# Patient Record
Sex: MALE | Race: OTHER | Employment: UNEMPLOYED | URBAN - METROPOLITAN AREA
[De-identification: names, ages, dates, MRNs, and addresses within clinical notes are randomized per-mention and may not be internally consistent; named-entity substitution may affect disease eponyms.]

---

## 2017-01-01 ENCOUNTER — APPOINTMENT (OUTPATIENT)
Dept: GENERAL RADIOLOGY | Age: 52
DRG: 617 | End: 2017-01-01
Attending: PHYSICIAN ASSISTANT
Payer: SELF-PAY

## 2017-01-01 ENCOUNTER — HOME HEALTH ADMISSION (OUTPATIENT)
Dept: HOME HEALTH SERVICES | Facility: HOME HEALTH | Age: 52
End: 2017-01-01

## 2017-01-01 ENCOUNTER — HOSPITAL ENCOUNTER (INPATIENT)
Age: 52
LOS: 17 days | Discharge: HOME HEALTH | DRG: 617 | End: 2017-08-10
Attending: EMERGENCY MEDICINE | Admitting: INTERNAL MEDICINE
Payer: SELF-PAY

## 2017-01-01 ENCOUNTER — HOME CARE VISIT (OUTPATIENT)
Dept: HOME HEALTH SERVICES | Facility: HOME HEALTH | Age: 52
End: 2017-01-01

## 2017-01-01 ENCOUNTER — HOME CARE VISIT (OUTPATIENT)
Dept: SCHEDULING | Facility: HOME HEALTH | Age: 52
End: 2017-01-01

## 2017-01-01 ENCOUNTER — APPOINTMENT (OUTPATIENT)
Dept: GENERAL RADIOLOGY | Age: 52
DRG: 617 | End: 2017-01-01
Attending: PODIATRIST
Payer: SELF-PAY

## 2017-01-01 ENCOUNTER — APPOINTMENT (OUTPATIENT)
Dept: INTERVENTIONAL RADIOLOGY/VASCULAR | Age: 52
DRG: 617 | End: 2017-01-01
Attending: SURGERY
Payer: SELF-PAY

## 2017-01-01 ENCOUNTER — ANESTHESIA EVENT (OUTPATIENT)
Dept: SURGERY | Age: 52
DRG: 617 | End: 2017-01-01
Payer: SELF-PAY

## 2017-01-01 ENCOUNTER — APPOINTMENT (OUTPATIENT)
Dept: MRI IMAGING | Age: 52
DRG: 617 | End: 2017-01-01
Attending: INTERNAL MEDICINE
Payer: SELF-PAY

## 2017-01-01 ENCOUNTER — APPOINTMENT (OUTPATIENT)
Dept: CT IMAGING | Age: 52
DRG: 617 | End: 2017-01-01
Attending: INTERNAL MEDICINE
Payer: SELF-PAY

## 2017-01-01 ENCOUNTER — ANESTHESIA (OUTPATIENT)
Dept: SURGERY | Age: 52
DRG: 617 | End: 2017-01-01
Payer: SELF-PAY

## 2017-01-01 VITALS
RESPIRATION RATE: 14 BRPM | OXYGEN SATURATION: 98 % | BODY MASS INDEX: 27.2 KG/M2 | WEIGHT: 190 LBS | HEIGHT: 70 IN | TEMPERATURE: 97.8 F | HEART RATE: 103 BPM | SYSTOLIC BLOOD PRESSURE: 148 MMHG | DIASTOLIC BLOOD PRESSURE: 101 MMHG

## 2017-01-01 VITALS
HEART RATE: 96 BPM | RESPIRATION RATE: 16 BRPM | SYSTOLIC BLOOD PRESSURE: 104 MMHG | OXYGEN SATURATION: 97 % | DIASTOLIC BLOOD PRESSURE: 58 MMHG | TEMPERATURE: 97.3 F

## 2017-01-01 VITALS
RESPIRATION RATE: 14 BRPM | OXYGEN SATURATION: 98 % | DIASTOLIC BLOOD PRESSURE: 60 MMHG | HEART RATE: 82 BPM | SYSTOLIC BLOOD PRESSURE: 110 MMHG

## 2017-01-01 VITALS
HEART RATE: 90 BPM | DIASTOLIC BLOOD PRESSURE: 72 MMHG | HEIGHT: 70 IN | OXYGEN SATURATION: 100 % | TEMPERATURE: 98.6 F | RESPIRATION RATE: 18 BRPM | SYSTOLIC BLOOD PRESSURE: 126 MMHG | WEIGHT: 188.71 LBS | BODY MASS INDEX: 27.02 KG/M2

## 2017-01-01 VITALS
TEMPERATURE: 97.6 F | HEART RATE: 67 BPM | SYSTOLIC BLOOD PRESSURE: 163 MMHG | RESPIRATION RATE: 14 BRPM | OXYGEN SATURATION: 95 % | DIASTOLIC BLOOD PRESSURE: 111 MMHG

## 2017-01-01 VITALS
DIASTOLIC BLOOD PRESSURE: 78 MMHG | SYSTOLIC BLOOD PRESSURE: 130 MMHG | TEMPERATURE: 96 F | OXYGEN SATURATION: 97 % | HEART RATE: 97 BPM

## 2017-01-01 VITALS
SYSTOLIC BLOOD PRESSURE: 138 MMHG | DIASTOLIC BLOOD PRESSURE: 72 MMHG | OXYGEN SATURATION: 99 % | HEART RATE: 98 BPM | RESPIRATION RATE: 16 BRPM

## 2017-01-01 DIAGNOSIS — S91.332A PUNCTURE WOUND OF FOOT, LEFT, INITIAL ENCOUNTER: ICD-10-CM

## 2017-01-01 DIAGNOSIS — L03.119 CELLULITIS OF FOOT: Primary | ICD-10-CM

## 2017-01-01 DIAGNOSIS — R73.9 HYPERGLYCEMIA: ICD-10-CM

## 2017-01-01 DIAGNOSIS — I99.8 ISCHEMIC LEG: ICD-10-CM

## 2017-01-01 LAB
ACID FAST STN SPEC: NEGATIVE
ALBUMIN SERPL BCP-MCNC: 2.9 G/DL (ref 3.4–5)
ALBUMIN/GLOB SERPL: 0.5 {RATIO} (ref 0.8–1.7)
ALP SERPL-CCNC: 99 U/L (ref 45–117)
ALT SERPL-CCNC: 16 U/L (ref 16–61)
ANION GAP BLD CALC-SCNC: 10 MMOL/L (ref 3–18)
ANION GAP BLD CALC-SCNC: 11 MMOL/L (ref 3–18)
ANION GAP BLD CALC-SCNC: 11 MMOL/L (ref 3–18)
ANION GAP BLD CALC-SCNC: 12 MMOL/L (ref 3–18)
ANION GAP BLD CALC-SCNC: 12 MMOL/L (ref 3–18)
ANION GAP BLD CALC-SCNC: 14 MMOL/L (ref 3–18)
ANION GAP BLD CALC-SCNC: 6 MMOL/L (ref 3–18)
ANION GAP BLD CALC-SCNC: 6 MMOL/L (ref 3–18)
ANION GAP BLD CALC-SCNC: 7 MMOL/L (ref 3–18)
ANION GAP BLD CALC-SCNC: 9 MMOL/L (ref 3–18)
APPEARANCE UR: CLEAR
AST SERPL W P-5'-P-CCNC: 12 U/L (ref 15–37)
ATRIAL RATE: 98 BPM
BACTERIA SPEC CULT: ABNORMAL
BACTERIA SPEC CULT: NORMAL
BACTERIA SPEC CULT: NORMAL
BACTERIA URNS QL MICRO: NEGATIVE /HPF
BASOPHILS # BLD AUTO: 0 K/UL (ref 0–0.1)
BASOPHILS # BLD AUTO: 0.1 K/UL (ref 0–0.06)
BASOPHILS # BLD: 0 % (ref 0–3)
BASOPHILS # BLD: 1 % (ref 0–2)
BILIRUB SERPL-MCNC: 0.7 MG/DL (ref 0.2–1)
BILIRUB UR QL: NEGATIVE
BUN SERPL-MCNC: 10 MG/DL (ref 7–18)
BUN SERPL-MCNC: 11 MG/DL (ref 7–18)
BUN SERPL-MCNC: 11 MG/DL (ref 7–18)
BUN SERPL-MCNC: 13 MG/DL (ref 7–18)
BUN SERPL-MCNC: 13 MG/DL (ref 7–18)
BUN SERPL-MCNC: 14 MG/DL (ref 7–18)
BUN SERPL-MCNC: 15 MG/DL (ref 7–18)
BUN SERPL-MCNC: 18 MG/DL (ref 7–18)
BUN SERPL-MCNC: 7 MG/DL (ref 7–18)
BUN SERPL-MCNC: 8 MG/DL (ref 7–18)
BUN SERPL-MCNC: 9 MG/DL (ref 7–18)
BUN/CREAT SERPL: 10 (ref 12–20)
BUN/CREAT SERPL: 10 (ref 12–20)
BUN/CREAT SERPL: 11 (ref 12–20)
BUN/CREAT SERPL: 12 (ref 12–20)
BUN/CREAT SERPL: 13 (ref 12–20)
BUN/CREAT SERPL: 14 (ref 12–20)
BUN/CREAT SERPL: 14 (ref 12–20)
BUN/CREAT SERPL: 16 (ref 12–20)
BUN/CREAT SERPL: 8 (ref 12–20)
BUN/CREAT SERPL: 9 (ref 12–20)
CALCIUM SERPL-MCNC: 8.1 MG/DL (ref 8.5–10.1)
CALCIUM SERPL-MCNC: 8.1 MG/DL (ref 8.5–10.1)
CALCIUM SERPL-MCNC: 8.2 MG/DL (ref 8.5–10.1)
CALCIUM SERPL-MCNC: 8.3 MG/DL (ref 8.5–10.1)
CALCIUM SERPL-MCNC: 8.4 MG/DL (ref 8.5–10.1)
CALCIUM SERPL-MCNC: 8.5 MG/DL (ref 8.5–10.1)
CALCIUM SERPL-MCNC: 9.1 MG/DL (ref 8.5–10.1)
CALCULATED P AXIS, ECG09: 33 DEGREES
CALCULATED R AXIS, ECG10: -43 DEGREES
CALCULATED T AXIS, ECG11: 43 DEGREES
CHLORIDE SERPL-SCNC: 100 MMOL/L (ref 100–108)
CHLORIDE SERPL-SCNC: 101 MMOL/L (ref 100–108)
CHLORIDE SERPL-SCNC: 102 MMOL/L (ref 100–108)
CHLORIDE SERPL-SCNC: 91 MMOL/L (ref 100–108)
CHLORIDE SERPL-SCNC: 96 MMOL/L (ref 100–108)
CHLORIDE SERPL-SCNC: 97 MMOL/L (ref 100–108)
CHLORIDE SERPL-SCNC: 98 MMOL/L (ref 100–108)
CHLORIDE SERPL-SCNC: 99 MMOL/L (ref 100–108)
CHOLEST SERPL-MCNC: 148 MG/DL
CO2 SERPL-SCNC: 22 MMOL/L (ref 21–32)
CO2 SERPL-SCNC: 24 MMOL/L (ref 21–32)
CO2 SERPL-SCNC: 24 MMOL/L (ref 21–32)
CO2 SERPL-SCNC: 25 MMOL/L (ref 21–32)
CO2 SERPL-SCNC: 25 MMOL/L (ref 21–32)
CO2 SERPL-SCNC: 26 MMOL/L (ref 21–32)
CO2 SERPL-SCNC: 27 MMOL/L (ref 21–32)
CO2 SERPL-SCNC: 28 MMOL/L (ref 21–32)
CO2 SERPL-SCNC: 29 MMOL/L (ref 21–32)
COLOR UR: ABNORMAL
CREAT SERPL-MCNC: 0.8 MG/DL (ref 0.6–1.3)
CREAT SERPL-MCNC: 0.81 MG/DL (ref 0.6–1.3)
CREAT SERPL-MCNC: 0.81 MG/DL (ref 0.6–1.3)
CREAT SERPL-MCNC: 0.84 MG/DL (ref 0.6–1.3)
CREAT SERPL-MCNC: 0.85 MG/DL (ref 0.6–1.3)
CREAT SERPL-MCNC: 0.86 MG/DL (ref 0.6–1.3)
CREAT SERPL-MCNC: 0.86 MG/DL (ref 0.6–1.3)
CREAT SERPL-MCNC: 0.87 MG/DL (ref 0.6–1.3)
CREAT SERPL-MCNC: 0.91 MG/DL (ref 0.6–1.3)
CREAT SERPL-MCNC: 0.92 MG/DL (ref 0.6–1.3)
CREAT SERPL-MCNC: 0.94 MG/DL (ref 0.6–1.3)
CREAT SERPL-MCNC: 0.94 MG/DL (ref 0.6–1.3)
CREAT SERPL-MCNC: 0.95 MG/DL (ref 0.6–1.3)
CREAT SERPL-MCNC: 0.99 MG/DL (ref 0.6–1.3)
CREAT SERPL-MCNC: 1.01 MG/DL (ref 0.6–1.3)
CREAT SERPL-MCNC: 1.01 MG/DL (ref 0.6–1.3)
CREAT SERPL-MCNC: 1.09 MG/DL (ref 0.6–1.3)
CREAT SERPL-MCNC: 1.11 MG/DL (ref 0.6–1.3)
CREAT SERPL-MCNC: 1.42 MG/DL (ref 0.6–1.3)
DATE LAST DOSE: ABNORMAL
DATE LAST DOSE: ABNORMAL
DATE LAST DOSE: NORMAL
DATE LAST DOSE: NORMAL
DIAGNOSIS, 93000: NORMAL
DIFFERENTIAL METHOD BLD: ABNORMAL
EOSINOPHIL # BLD: 0.2 K/UL (ref 0–0.4)
EOSINOPHIL # BLD: 0.2 K/UL (ref 0–0.4)
EOSINOPHIL # BLD: 0.3 K/UL (ref 0–0.4)
EOSINOPHIL NFR BLD: 1 % (ref 0–5)
EOSINOPHIL NFR BLD: 2 % (ref 0–5)
EOSINOPHIL NFR BLD: 2 % (ref 0–5)
EOSINOPHIL NFR BLD: 3 % (ref 0–5)
EOSINOPHIL NFR BLD: 3 % (ref 0–5)
EPITH CASTS URNS QL MICRO: NEGATIVE /LPF (ref 0–5)
ERYTHROCYTE [DISTWIDTH] IN BLOOD BY AUTOMATED COUNT: 12.9 % (ref 11.6–14.5)
ERYTHROCYTE [DISTWIDTH] IN BLOOD BY AUTOMATED COUNT: 13 % (ref 11.6–14.5)
ERYTHROCYTE [DISTWIDTH] IN BLOOD BY AUTOMATED COUNT: 13.1 % (ref 11.6–14.5)
ERYTHROCYTE [DISTWIDTH] IN BLOOD BY AUTOMATED COUNT: 13.2 % (ref 11.6–14.5)
ERYTHROCYTE [DISTWIDTH] IN BLOOD BY AUTOMATED COUNT: 13.2 % (ref 11.6–14.5)
ERYTHROCYTE [DISTWIDTH] IN BLOOD BY AUTOMATED COUNT: 13.7 % (ref 11.6–14.5)
EST. AVERAGE GLUCOSE BLD GHB EST-MCNC: 275 MG/DL
GLOBULIN SER CALC-MCNC: 5.3 G/DL (ref 2–4)
GLUCOSE BLD STRIP.AUTO-MCNC: 101 MG/DL (ref 70–110)
GLUCOSE BLD STRIP.AUTO-MCNC: 102 MG/DL (ref 70–110)
GLUCOSE BLD STRIP.AUTO-MCNC: 103 MG/DL (ref 70–110)
GLUCOSE BLD STRIP.AUTO-MCNC: 116 MG/DL (ref 70–110)
GLUCOSE BLD STRIP.AUTO-MCNC: 117 MG/DL (ref 70–110)
GLUCOSE BLD STRIP.AUTO-MCNC: 118 MG/DL (ref 70–110)
GLUCOSE BLD STRIP.AUTO-MCNC: 121 MG/DL (ref 70–110)
GLUCOSE BLD STRIP.AUTO-MCNC: 121 MG/DL (ref 70–110)
GLUCOSE BLD STRIP.AUTO-MCNC: 123 MG/DL (ref 70–110)
GLUCOSE BLD STRIP.AUTO-MCNC: 128 MG/DL (ref 70–110)
GLUCOSE BLD STRIP.AUTO-MCNC: 129 MG/DL (ref 70–110)
GLUCOSE BLD STRIP.AUTO-MCNC: 130 MG/DL (ref 70–110)
GLUCOSE BLD STRIP.AUTO-MCNC: 130 MG/DL (ref 70–110)
GLUCOSE BLD STRIP.AUTO-MCNC: 131 MG/DL (ref 70–110)
GLUCOSE BLD STRIP.AUTO-MCNC: 134 MG/DL (ref 70–110)
GLUCOSE BLD STRIP.AUTO-MCNC: 135 MG/DL (ref 70–110)
GLUCOSE BLD STRIP.AUTO-MCNC: 139 MG/DL (ref 70–110)
GLUCOSE BLD STRIP.AUTO-MCNC: 143 MG/DL (ref 70–110)
GLUCOSE BLD STRIP.AUTO-MCNC: 146 MG/DL (ref 70–110)
GLUCOSE BLD STRIP.AUTO-MCNC: 146 MG/DL (ref 70–110)
GLUCOSE BLD STRIP.AUTO-MCNC: 147 MG/DL (ref 70–110)
GLUCOSE BLD STRIP.AUTO-MCNC: 149 MG/DL (ref 70–110)
GLUCOSE BLD STRIP.AUTO-MCNC: 150 MG/DL (ref 70–110)
GLUCOSE BLD STRIP.AUTO-MCNC: 151 MG/DL (ref 70–110)
GLUCOSE BLD STRIP.AUTO-MCNC: 153 MG/DL (ref 70–110)
GLUCOSE BLD STRIP.AUTO-MCNC: 154 MG/DL (ref 70–110)
GLUCOSE BLD STRIP.AUTO-MCNC: 156 MG/DL (ref 70–110)
GLUCOSE BLD STRIP.AUTO-MCNC: 160 MG/DL (ref 70–110)
GLUCOSE BLD STRIP.AUTO-MCNC: 160 MG/DL (ref 70–110)
GLUCOSE BLD STRIP.AUTO-MCNC: 161 MG/DL (ref 70–110)
GLUCOSE BLD STRIP.AUTO-MCNC: 166 MG/DL (ref 70–110)
GLUCOSE BLD STRIP.AUTO-MCNC: 169 MG/DL (ref 70–110)
GLUCOSE BLD STRIP.AUTO-MCNC: 170 MG/DL (ref 70–110)
GLUCOSE BLD STRIP.AUTO-MCNC: 171 MG/DL (ref 70–110)
GLUCOSE BLD STRIP.AUTO-MCNC: 174 MG/DL (ref 70–110)
GLUCOSE BLD STRIP.AUTO-MCNC: 177 MG/DL (ref 70–110)
GLUCOSE BLD STRIP.AUTO-MCNC: 183 MG/DL (ref 70–110)
GLUCOSE BLD STRIP.AUTO-MCNC: 187 MG/DL (ref 70–110)
GLUCOSE BLD STRIP.AUTO-MCNC: 193 MG/DL (ref 70–110)
GLUCOSE BLD STRIP.AUTO-MCNC: 195 MG/DL (ref 70–110)
GLUCOSE BLD STRIP.AUTO-MCNC: 195 MG/DL (ref 70–110)
GLUCOSE BLD STRIP.AUTO-MCNC: 200 MG/DL (ref 70–110)
GLUCOSE BLD STRIP.AUTO-MCNC: 200 MG/DL (ref 70–110)
GLUCOSE BLD STRIP.AUTO-MCNC: 203 MG/DL (ref 70–110)
GLUCOSE BLD STRIP.AUTO-MCNC: 203 MG/DL (ref 70–110)
GLUCOSE BLD STRIP.AUTO-MCNC: 204 MG/DL (ref 70–110)
GLUCOSE BLD STRIP.AUTO-MCNC: 207 MG/DL (ref 70–110)
GLUCOSE BLD STRIP.AUTO-MCNC: 208 MG/DL (ref 70–110)
GLUCOSE BLD STRIP.AUTO-MCNC: 209 MG/DL (ref 70–110)
GLUCOSE BLD STRIP.AUTO-MCNC: 212 MG/DL (ref 70–110)
GLUCOSE BLD STRIP.AUTO-MCNC: 213 MG/DL (ref 70–110)
GLUCOSE BLD STRIP.AUTO-MCNC: 217 MG/DL (ref 70–110)
GLUCOSE BLD STRIP.AUTO-MCNC: 221 MG/DL (ref 70–110)
GLUCOSE BLD STRIP.AUTO-MCNC: 225 MG/DL (ref 70–110)
GLUCOSE BLD STRIP.AUTO-MCNC: 229 MG/DL (ref 70–110)
GLUCOSE BLD STRIP.AUTO-MCNC: 230 MG/DL (ref 70–110)
GLUCOSE BLD STRIP.AUTO-MCNC: 236 MG/DL (ref 70–110)
GLUCOSE BLD STRIP.AUTO-MCNC: 240 MG/DL (ref 70–110)
GLUCOSE BLD STRIP.AUTO-MCNC: 240 MG/DL (ref 70–110)
GLUCOSE BLD STRIP.AUTO-MCNC: 246 MG/DL (ref 70–110)
GLUCOSE BLD STRIP.AUTO-MCNC: 247 MG/DL (ref 70–110)
GLUCOSE BLD STRIP.AUTO-MCNC: 251 MG/DL (ref 70–110)
GLUCOSE BLD STRIP.AUTO-MCNC: 293 MG/DL (ref 70–110)
GLUCOSE BLD STRIP.AUTO-MCNC: 299 MG/DL (ref 70–110)
GLUCOSE BLD STRIP.AUTO-MCNC: 384 MG/DL (ref 70–110)
GLUCOSE BLD STRIP.AUTO-MCNC: 82 MG/DL (ref 70–110)
GLUCOSE BLD STRIP.AUTO-MCNC: 86 MG/DL (ref 70–110)
GLUCOSE BLD STRIP.AUTO-MCNC: 86 MG/DL (ref 70–110)
GLUCOSE BLD STRIP.AUTO-MCNC: 91 MG/DL (ref 70–110)
GLUCOSE BLD STRIP.AUTO-MCNC: 94 MG/DL (ref 70–110)
GLUCOSE BLD STRIP.AUTO-MCNC: 95 MG/DL (ref 70–110)
GLUCOSE BLD STRIP.AUTO-MCNC: 97 MG/DL (ref 70–110)
GLUCOSE BLD STRIP.AUTO-MCNC: 99 MG/DL (ref 70–110)
GLUCOSE SERPL-MCNC: 109 MG/DL (ref 74–99)
GLUCOSE SERPL-MCNC: 113 MG/DL (ref 74–99)
GLUCOSE SERPL-MCNC: 119 MG/DL (ref 74–99)
GLUCOSE SERPL-MCNC: 147 MG/DL (ref 74–99)
GLUCOSE SERPL-MCNC: 153 MG/DL (ref 74–99)
GLUCOSE SERPL-MCNC: 158 MG/DL (ref 74–99)
GLUCOSE SERPL-MCNC: 160 MG/DL (ref 74–99)
GLUCOSE SERPL-MCNC: 174 MG/DL (ref 74–99)
GLUCOSE SERPL-MCNC: 178 MG/DL (ref 74–99)
GLUCOSE SERPL-MCNC: 192 MG/DL (ref 74–99)
GLUCOSE SERPL-MCNC: 200 MG/DL (ref 74–99)
GLUCOSE SERPL-MCNC: 208 MG/DL (ref 74–99)
GLUCOSE SERPL-MCNC: 212 MG/DL (ref 74–99)
GLUCOSE SERPL-MCNC: 234 MG/DL (ref 74–99)
GLUCOSE SERPL-MCNC: 257 MG/DL (ref 74–99)
GLUCOSE SERPL-MCNC: 265 MG/DL (ref 74–99)
GLUCOSE SERPL-MCNC: 303 MG/DL (ref 74–99)
GLUCOSE SERPL-MCNC: 69 MG/DL (ref 74–99)
GLUCOSE SERPL-MCNC: 88 MG/DL (ref 74–99)
GLUCOSE UR STRIP.AUTO-MCNC: >1000 MG/DL
GRAM STN SPEC: ABNORMAL
HBA1C MFR BLD: 11.2 % (ref 4.5–5.6)
HCT VFR BLD AUTO: 32.4 % (ref 36–48)
HCT VFR BLD AUTO: 33.1 % (ref 36–48)
HCT VFR BLD AUTO: 34 % (ref 36–48)
HCT VFR BLD AUTO: 34.4 % (ref 36–48)
HCT VFR BLD AUTO: 34.5 % (ref 36–48)
HCT VFR BLD AUTO: 34.6 % (ref 36–48)
HCT VFR BLD AUTO: 34.9 % (ref 36–48)
HCT VFR BLD AUTO: 35.1 % (ref 36–48)
HCT VFR BLD AUTO: 35.5 % (ref 36–48)
HCT VFR BLD AUTO: 35.8 % (ref 36–48)
HCT VFR BLD AUTO: 36.3 % (ref 36–48)
HCT VFR BLD AUTO: 39.6 % (ref 36–48)
HDLC SERPL-MCNC: 26 MG/DL (ref 40–60)
HDLC SERPL: 5.7 {RATIO} (ref 0–5)
HGB BLD-MCNC: 10.7 G/DL (ref 13–16)
HGB BLD-MCNC: 11.7 G/DL (ref 13–16)
HGB BLD-MCNC: 11.9 G/DL (ref 13–16)
HGB BLD-MCNC: 12 G/DL (ref 13–16)
HGB BLD-MCNC: 12.3 G/DL (ref 13–16)
HGB BLD-MCNC: 12.4 G/DL (ref 13–16)
HGB BLD-MCNC: 12.6 G/DL (ref 13–16)
HGB BLD-MCNC: 14.1 G/DL (ref 13–16)
HGB UR QL STRIP: ABNORMAL
INR PPP: 1.1 (ref 0.8–1.2)
KETONES UR QL STRIP.AUTO: 40 MG/DL
LACTATE SERPL-SCNC: 1.2 MMOL/L (ref 0.4–2)
LDLC SERPL CALC-MCNC: 104.6 MG/DL (ref 0–100)
LEUKOCYTE ESTERASE UR QL STRIP.AUTO: NEGATIVE
LIPID PROFILE,FLP: ABNORMAL
LYMPHOCYTES # BLD AUTO: 17 % (ref 21–52)
LYMPHOCYTES # BLD AUTO: 18 % (ref 21–52)
LYMPHOCYTES # BLD AUTO: 19 % (ref 21–52)
LYMPHOCYTES # BLD AUTO: 20 % (ref 21–52)
LYMPHOCYTES # BLD AUTO: 9 % (ref 20–51)
LYMPHOCYTES # BLD: 1.5 K/UL (ref 0.8–3.5)
LYMPHOCYTES # BLD: 1.7 K/UL (ref 0.9–3.6)
LYMPHOCYTES # BLD: 1.7 K/UL (ref 0.9–3.6)
LYMPHOCYTES # BLD: 2.1 K/UL (ref 0.9–3.6)
LYMPHOCYTES # BLD: 2.2 K/UL (ref 0.9–3.6)
MAGNESIUM SERPL-MCNC: 1.8 MG/DL (ref 1.6–2.6)
MAGNESIUM SERPL-MCNC: 1.8 MG/DL (ref 1.6–2.6)
MAGNESIUM SERPL-MCNC: 1.9 MG/DL (ref 1.6–2.6)
MAGNESIUM SERPL-MCNC: 2 MG/DL (ref 1.6–2.6)
MAGNESIUM SERPL-MCNC: 2.1 MG/DL (ref 1.6–2.6)
MAGNESIUM SERPL-MCNC: 2.2 MG/DL (ref 1.6–2.6)
MCH RBC QN AUTO: 28.7 PG (ref 24–34)
MCH RBC QN AUTO: 28.8 PG (ref 24–34)
MCH RBC QN AUTO: 28.8 PG (ref 24–34)
MCH RBC QN AUTO: 29.1 PG (ref 24–34)
MCH RBC QN AUTO: 29.2 PG (ref 24–34)
MCH RBC QN AUTO: 29.2 PG (ref 24–34)
MCH RBC QN AUTO: 29.3 PG (ref 24–34)
MCH RBC QN AUTO: 29.4 PG (ref 24–34)
MCH RBC QN AUTO: 29.4 PG (ref 24–34)
MCH RBC QN AUTO: 29.6 PG (ref 24–34)
MCH RBC QN AUTO: 29.8 PG (ref 24–34)
MCH RBC QN AUTO: 29.9 PG (ref 24–34)
MCHC RBC AUTO-ENTMCNC: 33 G/DL (ref 31–37)
MCHC RBC AUTO-ENTMCNC: 33.8 G/DL (ref 31–37)
MCHC RBC AUTO-ENTMCNC: 33.9 G/DL (ref 31–37)
MCHC RBC AUTO-ENTMCNC: 34.2 G/DL (ref 31–37)
MCHC RBC AUTO-ENTMCNC: 34.4 G/DL (ref 31–37)
MCHC RBC AUTO-ENTMCNC: 34.4 G/DL (ref 31–37)
MCHC RBC AUTO-ENTMCNC: 34.6 G/DL (ref 31–37)
MCHC RBC AUTO-ENTMCNC: 34.7 G/DL (ref 31–37)
MCHC RBC AUTO-ENTMCNC: 34.9 G/DL (ref 31–37)
MCHC RBC AUTO-ENTMCNC: 35.2 G/DL (ref 31–37)
MCHC RBC AUTO-ENTMCNC: 35.3 G/DL (ref 31–37)
MCHC RBC AUTO-ENTMCNC: 35.6 G/DL (ref 31–37)
MCV RBC AUTO: 83.5 FL (ref 74–97)
MCV RBC AUTO: 84.1 FL (ref 74–97)
MCV RBC AUTO: 84.2 FL (ref 74–97)
MCV RBC AUTO: 84.2 FL (ref 74–97)
MCV RBC AUTO: 84.6 FL (ref 74–97)
MCV RBC AUTO: 84.6 FL (ref 74–97)
MCV RBC AUTO: 84.7 FL (ref 74–97)
MCV RBC AUTO: 85 FL (ref 74–97)
MCV RBC AUTO: 85.1 FL (ref 74–97)
MCV RBC AUTO: 87.3 FL (ref 74–97)
MONOCYTES # BLD: 1.1 K/UL (ref 0.05–1.2)
MONOCYTES # BLD: 1.2 K/UL (ref 0.05–1.2)
MONOCYTES # BLD: 1.3 K/UL (ref 0–1)
MONOCYTES NFR BLD AUTO: 10 % (ref 3–10)
MONOCYTES NFR BLD AUTO: 10 % (ref 3–10)
MONOCYTES NFR BLD AUTO: 11 % (ref 3–10)
MONOCYTES NFR BLD AUTO: 14 % (ref 3–10)
MONOCYTES NFR BLD AUTO: 8 % (ref 2–9)
MYCOBACTERIUM SPEC QL CULT: NEGATIVE
NEUTS SEG # BLD: 13.7 K/UL (ref 1.8–8)
NEUTS SEG # BLD: 5.1 K/UL (ref 1.8–8)
NEUTS SEG # BLD: 7.1 K/UL (ref 1.8–8)
NEUTS SEG # BLD: 7.9 K/UL (ref 1.8–8)
NEUTS SEG # BLD: 8.1 K/UL (ref 1.8–8)
NEUTS SEG NFR BLD AUTO: 63 % (ref 40–73)
NEUTS SEG NFR BLD AUTO: 67 % (ref 40–73)
NEUTS SEG NFR BLD AUTO: 68 % (ref 40–73)
NEUTS SEG NFR BLD AUTO: 69 % (ref 40–73)
NEUTS SEG NFR BLD AUTO: 82 % (ref 42–75)
NITRITE UR QL STRIP.AUTO: NEGATIVE
P-R INTERVAL, ECG05: 154 MS
PH UR STRIP: 6 [PH] (ref 5–8)
PLATELET # BLD AUTO: 223 K/UL (ref 135–420)
PLATELET # BLD AUTO: 244 K/UL (ref 135–420)
PLATELET # BLD AUTO: 251 K/UL (ref 135–420)
PLATELET # BLD AUTO: 258 K/UL (ref 135–420)
PLATELET # BLD AUTO: 260 K/UL (ref 135–420)
PLATELET # BLD AUTO: 279 K/UL (ref 135–420)
PLATELET # BLD AUTO: 298 K/UL (ref 135–420)
PLATELET # BLD AUTO: 351 K/UL (ref 135–420)
PLATELET # BLD AUTO: 362 K/UL (ref 135–420)
PLATELET # BLD AUTO: 372 K/UL (ref 135–420)
PLATELET # BLD AUTO: 376 K/UL (ref 135–420)
PLATELET # BLD AUTO: 400 K/UL (ref 135–420)
PMV BLD AUTO: 10 FL (ref 9.2–11.8)
PMV BLD AUTO: 10 FL (ref 9.2–11.8)
PMV BLD AUTO: 9.1 FL (ref 9.2–11.8)
PMV BLD AUTO: 9.1 FL (ref 9.2–11.8)
PMV BLD AUTO: 9.2 FL (ref 9.2–11.8)
PMV BLD AUTO: 9.3 FL (ref 9.2–11.8)
PMV BLD AUTO: 9.3 FL (ref 9.2–11.8)
PMV BLD AUTO: 9.4 FL (ref 9.2–11.8)
PMV BLD AUTO: 9.5 FL (ref 9.2–11.8)
PMV BLD AUTO: 9.6 FL (ref 9.2–11.8)
PMV BLD AUTO: 9.8 FL (ref 9.2–11.8)
PMV BLD AUTO: 9.9 FL (ref 9.2–11.8)
POTASSIUM SERPL-SCNC: 3.4 MMOL/L (ref 3.5–5.5)
POTASSIUM SERPL-SCNC: 3.5 MMOL/L (ref 3.5–5.5)
POTASSIUM SERPL-SCNC: 3.6 MMOL/L (ref 3.5–5.5)
POTASSIUM SERPL-SCNC: 3.7 MMOL/L (ref 3.5–5.5)
POTASSIUM SERPL-SCNC: 3.8 MMOL/L (ref 3.5–5.5)
POTASSIUM SERPL-SCNC: 3.9 MMOL/L (ref 3.5–5.5)
POTASSIUM SERPL-SCNC: 4 MMOL/L (ref 3.5–5.5)
POTASSIUM SERPL-SCNC: 4.1 MMOL/L (ref 3.5–5.5)
POTASSIUM SERPL-SCNC: 4.1 MMOL/L (ref 3.5–5.5)
POTASSIUM SERPL-SCNC: 4.3 MMOL/L (ref 3.5–5.5)
POTASSIUM SERPL-SCNC: 4.4 MMOL/L (ref 3.5–5.5)
PROT SERPL-MCNC: 8.2 G/DL (ref 6.4–8.2)
PROT UR STRIP-MCNC: 100 MG/DL
PROTHROMBIN TIME: 13.7 SEC (ref 11.5–15.2)
Q-T INTERVAL, ECG07: 358 MS
QRS DURATION, ECG06: 102 MS
QTC CALCULATION (BEZET), ECG08: 457 MS
RBC # BLD AUTO: 3.71 M/UL (ref 4.7–5.5)
RBC # BLD AUTO: 3.93 M/UL (ref 4.7–5.5)
RBC # BLD AUTO: 4 M/UL (ref 4.7–5.5)
RBC # BLD AUTO: 4.06 M/UL (ref 4.7–5.5)
RBC # BLD AUTO: 4.07 M/UL (ref 4.7–5.5)
RBC # BLD AUTO: 4.08 M/UL (ref 4.7–5.5)
RBC # BLD AUTO: 4.13 M/UL (ref 4.7–5.5)
RBC # BLD AUTO: 4.17 M/UL (ref 4.7–5.5)
RBC # BLD AUTO: 4.18 M/UL (ref 4.7–5.5)
RBC # BLD AUTO: 4.25 M/UL (ref 4.7–5.5)
RBC # BLD AUTO: 4.29 M/UL (ref 4.7–5.5)
RBC # BLD AUTO: 4.71 M/UL (ref 4.7–5.5)
RBC #/AREA URNS HPF: NORMAL /HPF (ref 0–5)
RBC MORPH BLD: ABNORMAL
REPORTED DOSE,DOSE: ABNORMAL UNITS
REPORTED DOSE,DOSE: ABNORMAL UNITS
REPORTED DOSE,DOSE: NORMAL UNITS
REPORTED DOSE,DOSE: NORMAL UNITS
REPORTED DOSE/TIME,TMG: 1000
REPORTED DOSE/TIME,TMG: 1114
REPORTED DOSE/TIME,TMG: 407
REPORTED DOSE/TIME,TMG: 933
SERVICE CMNT-IMP: ABNORMAL
SERVICE CMNT-IMP: NORMAL
SERVICE CMNT-IMP: NORMAL
SODIUM SERPL-SCNC: 127 MMOL/L (ref 136–145)
SODIUM SERPL-SCNC: 131 MMOL/L (ref 136–145)
SODIUM SERPL-SCNC: 132 MMOL/L (ref 136–145)
SODIUM SERPL-SCNC: 132 MMOL/L (ref 136–145)
SODIUM SERPL-SCNC: 133 MMOL/L (ref 136–145)
SODIUM SERPL-SCNC: 134 MMOL/L (ref 136–145)
SODIUM SERPL-SCNC: 135 MMOL/L (ref 136–145)
SODIUM SERPL-SCNC: 136 MMOL/L (ref 136–145)
SODIUM SERPL-SCNC: 138 MMOL/L (ref 136–145)
SODIUM SERPL-SCNC: 138 MMOL/L (ref 136–145)
SP GR UR REFRACTOMETRY: 1.03 (ref 1–1.03)
SPECIMEN PREPARATION: NORMAL
SPECIMEN SOURCE: NORMAL
TRIGL SERPL-MCNC: 87 MG/DL (ref ?–150)
UROBILINOGEN UR QL STRIP.AUTO: 2 EU/DL (ref 0.2–1)
VANCOMYCIN SERPL-MCNC: 13.1 UG/ML (ref 5–40)
VANCOMYCIN TROUGH SERPL-MCNC: 13.2 UG/ML (ref 10–20)
VANCOMYCIN TROUGH SERPL-MCNC: 18.4 UG/ML (ref 10–20)
VANCOMYCIN TROUGH SERPL-MCNC: 19.8 UG/ML (ref 10–20)
VANCOMYCIN TROUGH SERPL-MCNC: 21.1 UG/ML (ref 10–20)
VANCOMYCIN TROUGH SERPL-MCNC: 6.2 UG/ML (ref 10–20)
VENTRICULAR RATE, ECG03: 98 BPM
VLDLC SERPL CALC-MCNC: 17.4 MG/DL
WBC # BLD AUTO: 10.2 K/UL (ref 4.6–13.2)
WBC # BLD AUTO: 11.4 K/UL (ref 4.6–13.2)
WBC # BLD AUTO: 11.8 K/UL (ref 4.6–13.2)
WBC # BLD AUTO: 12.7 K/UL (ref 4.6–13.2)
WBC # BLD AUTO: 13.1 K/UL (ref 4.6–13.2)
WBC # BLD AUTO: 14.8 K/UL (ref 4.6–13.2)
WBC # BLD AUTO: 15.4 K/UL (ref 4.6–13.2)
WBC # BLD AUTO: 16.7 K/UL (ref 4.6–13.2)
WBC # BLD AUTO: 16.8 K/UL (ref 4.6–13.2)
WBC # BLD AUTO: 8.1 K/UL (ref 4.6–13.2)
WBC URNS QL MICRO: NORMAL /HPF (ref 0–5)

## 2017-01-01 PROCEDURE — 87070 CULTURE OTHR SPECIMN AEROBIC: CPT | Performed by: PODIATRIST

## 2017-01-01 PROCEDURE — 85025 COMPLETE CBC W/AUTO DIFF WBC: CPT | Performed by: SURGERY

## 2017-01-01 PROCEDURE — 74011250636 HC RX REV CODE- 250/636: Performed by: INTERNAL MEDICINE

## 2017-01-01 PROCEDURE — G0299 HHS/HOSPICE OF RN EA 15 MIN: HCPCS

## 2017-01-01 PROCEDURE — 80202 ASSAY OF VANCOMYCIN: CPT | Performed by: INTERNAL MEDICINE

## 2017-01-01 PROCEDURE — 74011250636 HC RX REV CODE- 250/636: Performed by: PODIATRIST

## 2017-01-01 PROCEDURE — 74011000250 HC RX REV CODE- 250

## 2017-01-01 PROCEDURE — 36415 COLL VENOUS BLD VENIPUNCTURE: CPT | Performed by: INTERNAL MEDICINE

## 2017-01-01 PROCEDURE — 74011250636 HC RX REV CODE- 250/636: Performed by: FAMILY MEDICINE

## 2017-01-01 PROCEDURE — 400013 HH SOC

## 2017-01-01 PROCEDURE — 74011636637 HC RX REV CODE- 636/637: Performed by: INTERNAL MEDICINE

## 2017-01-01 PROCEDURE — 82962 GLUCOSE BLOOD TEST: CPT

## 2017-01-01 PROCEDURE — 36415 COLL VENOUS BLD VENIPUNCTURE: CPT | Performed by: SURGERY

## 2017-01-01 PROCEDURE — 74011636637 HC RX REV CODE- 636/637: Performed by: HOSPITALIST

## 2017-01-01 PROCEDURE — G0155 HHCP-SVS OF CSW,EA 15 MIN: HCPCS

## 2017-01-01 PROCEDURE — B4101ZZ FLUOROSCOPY OF ABDOMINAL AORTA USING LOW OSMOLAR CONTRAST: ICD-10-PCS | Performed by: SURGERY

## 2017-01-01 PROCEDURE — 73630 X-RAY EXAM OF FOOT: CPT

## 2017-01-01 PROCEDURE — 74011250637 HC RX REV CODE- 250/637: Performed by: INTERNAL MEDICINE

## 2017-01-01 PROCEDURE — 74011636320 HC RX REV CODE- 636/320: Performed by: SURGERY

## 2017-01-01 PROCEDURE — 87186 SC STD MICRODIL/AGAR DIL: CPT | Performed by: PODIATRIST

## 2017-01-01 PROCEDURE — 97530 THERAPEUTIC ACTIVITIES: CPT

## 2017-01-01 PROCEDURE — G0151 HHCP-SERV OF PT,EA 15 MIN: HCPCS

## 2017-01-01 PROCEDURE — 65270000029 HC RM PRIVATE

## 2017-01-01 PROCEDURE — 76010000138 HC OR TIME 0.5 TO 1 HR: Performed by: PODIATRIST

## 2017-01-01 PROCEDURE — 74011250636 HC RX REV CODE- 250/636: Performed by: HOSPITALIST

## 2017-01-01 PROCEDURE — 77030013079 HC BLNKT BAIR HGGR 3M -A: Performed by: ANESTHESIOLOGY

## 2017-01-01 PROCEDURE — 93922 UPR/L XTREMITY ART 2 LEVELS: CPT

## 2017-01-01 PROCEDURE — 0Y6N0ZB DETACHMENT AT LEFT FOOT, PARTIAL 2ND RAY, OPEN APPROACH: ICD-10-PCS | Performed by: PODIATRIST

## 2017-01-01 PROCEDURE — 80048 BASIC METABOLIC PNL TOTAL CA: CPT | Performed by: PHYSICIAN ASSISTANT

## 2017-01-01 PROCEDURE — 74011636637 HC RX REV CODE- 636/637: Performed by: FAMILY MEDICINE

## 2017-01-01 PROCEDURE — 83735 ASSAY OF MAGNESIUM: CPT | Performed by: SURGERY

## 2017-01-01 PROCEDURE — 81001 URINALYSIS AUTO W/SCOPE: CPT | Performed by: PHYSICIAN ASSISTANT

## 2017-01-01 PROCEDURE — 77030018836 HC SOL IRR NACL ICUM -A: Performed by: PODIATRIST

## 2017-01-01 PROCEDURE — 80048 BASIC METABOLIC PNL TOTAL CA: CPT | Performed by: HOSPITALIST

## 2017-01-01 PROCEDURE — 77030011640 HC PAD GRND REM COVD -A: Performed by: PODIATRIST

## 2017-01-01 PROCEDURE — 74011250637 HC RX REV CODE- 250/637: Performed by: PHYSICIAN ASSISTANT

## 2017-01-01 PROCEDURE — 97116 GAIT TRAINING THERAPY: CPT

## 2017-01-01 PROCEDURE — 74011250637 HC RX REV CODE- 250/637: Performed by: SURGERY

## 2017-01-01 PROCEDURE — 74011250636 HC RX REV CODE- 250/636: Performed by: PHYSICIAN ASSISTANT

## 2017-01-01 PROCEDURE — 74011250636 HC RX REV CODE- 250/636

## 2017-01-01 PROCEDURE — 85025 COMPLETE CBC W/AUTO DIFF WBC: CPT | Performed by: PHYSICIAN ASSISTANT

## 2017-01-01 PROCEDURE — 87076 CULTURE ANAEROBE IDENT EACH: CPT | Performed by: PODIATRIST

## 2017-01-01 PROCEDURE — 77030019895 HC PCKNG STRP IODO -A: Performed by: PODIATRIST

## 2017-01-01 PROCEDURE — 74011000250 HC RX REV CODE- 250: Performed by: SURGERY

## 2017-01-01 PROCEDURE — 0Y6N0ZD DETACHMENT AT LEFT FOOT, PARTIAL 4TH RAY, OPEN APPROACH: ICD-10-PCS | Performed by: PODIATRIST

## 2017-01-01 PROCEDURE — 85027 COMPLETE CBC AUTOMATED: CPT | Performed by: PHYSICIAN ASSISTANT

## 2017-01-01 PROCEDURE — 74011250637 HC RX REV CODE- 250/637: Performed by: FAMILY MEDICINE

## 2017-01-01 PROCEDURE — 88305 TISSUE EXAM BY PATHOLOGIST: CPT | Performed by: PODIATRIST

## 2017-01-01 PROCEDURE — 77030013567 HC DRN WND RESERV BARD -A: Performed by: PODIATRIST

## 2017-01-01 PROCEDURE — 97161 PT EVAL LOW COMPLEX 20 MIN: CPT

## 2017-01-01 PROCEDURE — 80061 LIPID PANEL: CPT | Performed by: PHYSICIAN ASSISTANT

## 2017-01-01 PROCEDURE — 73701 CT LOWER EXTREMITY W/DYE: CPT

## 2017-01-01 PROCEDURE — 87077 CULTURE AEROBIC IDENTIFY: CPT | Performed by: PODIATRIST

## 2017-01-01 PROCEDURE — 74011636320 HC RX REV CODE- 636/320: Performed by: INTERNAL MEDICINE

## 2017-01-01 PROCEDURE — 36415 COLL VENOUS BLD VENIPUNCTURE: CPT | Performed by: PHYSICIAN ASSISTANT

## 2017-01-01 PROCEDURE — B41G1ZZ FLUOROSCOPY OF LEFT LOWER EXTREMITY ARTERIES USING LOW OSMOLAR CONTRAST: ICD-10-PCS | Performed by: SURGERY

## 2017-01-01 PROCEDURE — A6446 CONFORM BAND S W>=3" <5"/YD: HCPCS

## 2017-01-01 PROCEDURE — 76010000132 HC OR TIME 2.5 TO 3 HR: Performed by: PODIATRIST

## 2017-01-01 PROCEDURE — 99153 MOD SED SAME PHYS/QHP EA: CPT

## 2017-01-01 PROCEDURE — 96365 THER/PROPH/DIAG IV INF INIT: CPT

## 2017-01-01 PROCEDURE — 74011000250 HC RX REV CODE- 250: Performed by: PODIATRIST

## 2017-01-01 PROCEDURE — G0157 HHC PT ASSISTANT EA 15: HCPCS

## 2017-01-01 PROCEDURE — 85610 PROTHROMBIN TIME: CPT | Performed by: FAMILY MEDICINE

## 2017-01-01 PROCEDURE — 80048 BASIC METABOLIC PNL TOTAL CA: CPT | Performed by: SURGERY

## 2017-01-01 PROCEDURE — 83605 ASSAY OF LACTIC ACID: CPT | Performed by: PHYSICIAN ASSISTANT

## 2017-01-01 PROCEDURE — 99152 MOD SED SAME PHYS/QHP 5/>YRS: CPT

## 2017-01-01 PROCEDURE — 87075 CULTR BACTERIA EXCEPT BLOOD: CPT | Performed by: PODIATRIST

## 2017-01-01 PROCEDURE — 04CQ3ZZ EXTIRPATION OF MATTER FROM LEFT ANTERIOR TIBIAL ARTERY, PERCUTANEOUS APPROACH: ICD-10-PCS | Performed by: SURGERY

## 2017-01-01 PROCEDURE — A6252 ABSORPT DRG >16 <=48 W/O BDR: HCPCS

## 2017-01-01 PROCEDURE — 77030012508 HC MSK AIRWY LMA AMBU -A: Performed by: ANESTHESIOLOGY

## 2017-01-01 PROCEDURE — 87070 CULTURE OTHR SPECIMN AEROBIC: CPT | Performed by: PHYSICIAN ASSISTANT

## 2017-01-01 PROCEDURE — 80048 BASIC METABOLIC PNL TOTAL CA: CPT | Performed by: INTERNAL MEDICINE

## 2017-01-01 PROCEDURE — 93005 ELECTROCARDIOGRAM TRACING: CPT

## 2017-01-01 PROCEDURE — 76060000032 HC ANESTHESIA 0.5 TO 1 HR: Performed by: PODIATRIST

## 2017-01-01 PROCEDURE — 87077 CULTURE AEROBIC IDENTIFY: CPT | Performed by: PHYSICIAN ASSISTANT

## 2017-01-01 PROCEDURE — 0J9R0ZZ DRAINAGE OF LEFT FOOT SUBCUTANEOUS TISSUE AND FASCIA, OPEN APPROACH: ICD-10-PCS | Performed by: PODIATRIST

## 2017-01-01 PROCEDURE — 74011250637 HC RX REV CODE- 250/637: Performed by: HOSPITALIST

## 2017-01-01 PROCEDURE — 77030011245: Performed by: PODIATRIST

## 2017-01-01 PROCEDURE — 74011250636 HC RX REV CODE- 250/636: Performed by: SURGERY

## 2017-01-01 PROCEDURE — A9585 GADOBUTROL INJECTION: HCPCS | Performed by: FAMILY MEDICINE

## 2017-01-01 PROCEDURE — 97166 OT EVAL MOD COMPLEX 45 MIN: CPT

## 2017-01-01 PROCEDURE — 83036 HEMOGLOBIN GLYCOSYLATED A1C: CPT | Performed by: INTERNAL MEDICINE

## 2017-01-01 PROCEDURE — 0Y6N0ZF DETACHMENT AT LEFT FOOT, PARTIAL 5TH RAY, OPEN APPROACH: ICD-10-PCS | Performed by: PODIATRIST

## 2017-01-01 PROCEDURE — 76210000006 HC OR PH I REC 0.5 TO 1 HR: Performed by: PODIATRIST

## 2017-01-01 PROCEDURE — 76060000036 HC ANESTHESIA 2.5 TO 3 HR: Performed by: PODIATRIST

## 2017-01-01 PROCEDURE — 80053 COMPREHEN METABOLIC PANEL: CPT | Performed by: PHYSICIAN ASSISTANT

## 2017-01-01 PROCEDURE — 87116 MYCOBACTERIA CULTURE: CPT | Performed by: PODIATRIST

## 2017-01-01 PROCEDURE — 85027 COMPLETE CBC AUTOMATED: CPT | Performed by: INTERNAL MEDICINE

## 2017-01-01 PROCEDURE — 0Y6N0ZC DETACHMENT AT LEFT FOOT, PARTIAL 3RD RAY, OPEN APPROACH: ICD-10-PCS | Performed by: PODIATRIST

## 2017-01-01 PROCEDURE — 047Q3ZZ DILATION OF LEFT ANTERIOR TIBIAL ARTERY, PERCUTANEOUS APPROACH: ICD-10-PCS | Performed by: SURGERY

## 2017-01-01 PROCEDURE — 85025 COMPLETE CBC W/AUTO DIFF WBC: CPT | Performed by: INTERNAL MEDICINE

## 2017-01-01 PROCEDURE — 99284 EMERGENCY DEPT VISIT MOD MDM: CPT

## 2017-01-01 PROCEDURE — 83735 ASSAY OF MAGNESIUM: CPT | Performed by: PHYSICIAN ASSISTANT

## 2017-01-01 PROCEDURE — 73720 MRI LWR EXTREMITY W/O&W/DYE: CPT

## 2017-01-01 PROCEDURE — 0LBW0ZZ EXCISION OF LEFT FOOT TENDON, OPEN APPROACH: ICD-10-PCS | Performed by: PODIATRIST

## 2017-01-01 PROCEDURE — 97535 SELF CARE MNGMENT TRAINING: CPT

## 2017-01-01 PROCEDURE — 88311 DECALCIFY TISSUE: CPT | Performed by: PODIATRIST

## 2017-01-01 PROCEDURE — A6450 LT COMPRES BAND >=5"/YD: HCPCS

## 2017-01-01 PROCEDURE — 76937 US GUIDE VASCULAR ACCESS: CPT

## 2017-01-01 PROCEDURE — 87040 BLOOD CULTURE FOR BACTERIA: CPT | Performed by: PHYSICIAN ASSISTANT

## 2017-01-01 PROCEDURE — 77010033678 HC OXYGEN DAILY

## 2017-01-01 PROCEDURE — 77030002916 HC SUT ETHLN J&J -A: Performed by: PODIATRIST

## 2017-01-01 PROCEDURE — A6449 LT COMPRES BAND >=3" <5"/YD: HCPCS

## 2017-01-01 RX ORDER — HYDROMORPHONE HYDROCHLORIDE 1 MG/ML
1 INJECTION, SOLUTION INTRAMUSCULAR; INTRAVENOUS; SUBCUTANEOUS
Status: DISCONTINUED | OUTPATIENT
Start: 2017-01-01 | End: 2017-01-01

## 2017-01-01 RX ORDER — SODIUM CHLORIDE 9 MG/ML
75 INJECTION, SOLUTION INTRAVENOUS CONTINUOUS
Status: DISCONTINUED | OUTPATIENT
Start: 2017-01-01 | End: 2017-01-01

## 2017-01-01 RX ORDER — ALBUTEROL SULFATE 0.83 MG/ML
2.5 SOLUTION RESPIRATORY (INHALATION) AS NEEDED
Status: DISCONTINUED | OUTPATIENT
Start: 2017-01-01 | End: 2017-01-01 | Stop reason: SDUPTHER

## 2017-01-01 RX ORDER — MIDAZOLAM HYDROCHLORIDE 1 MG/ML
1-4 INJECTION, SOLUTION INTRAMUSCULAR; INTRAVENOUS
Status: DISCONTINUED | OUTPATIENT
Start: 2017-01-01 | End: 2017-01-01 | Stop reason: ALTCHOICE

## 2017-01-01 RX ORDER — INSULIN LISPRO 100 [IU]/ML
5 INJECTION, SOLUTION INTRAVENOUS; SUBCUTANEOUS
Status: DISCONTINUED | OUTPATIENT
Start: 2017-01-01 | End: 2017-01-01

## 2017-01-01 RX ORDER — HEPARIN SODIUM 200 [USP'U]/100ML
1000 INJECTION, SOLUTION INTRAVENOUS
Status: COMPLETED | OUTPATIENT
Start: 2017-01-01 | End: 2017-01-01

## 2017-01-01 RX ORDER — SODIUM CHLORIDE, SODIUM LACTATE, POTASSIUM CHLORIDE, CALCIUM CHLORIDE 600; 310; 30; 20 MG/100ML; MG/100ML; MG/100ML; MG/100ML
150 INJECTION, SOLUTION INTRAVENOUS CONTINUOUS
Status: DISCONTINUED | OUTPATIENT
Start: 2017-01-01 | End: 2017-01-01 | Stop reason: SDUPTHER

## 2017-01-01 RX ORDER — SODIUM CHLORIDE 9 MG/ML
100 INJECTION, SOLUTION INTRAVENOUS CONTINUOUS
Status: DISCONTINUED | OUTPATIENT
Start: 2017-01-01 | End: 2017-01-01

## 2017-01-01 RX ORDER — POTASSIUM CHLORIDE 20 MEQ/1
20 TABLET, EXTENDED RELEASE ORAL
Status: COMPLETED | OUTPATIENT
Start: 2017-01-01 | End: 2017-01-01

## 2017-01-01 RX ORDER — ONDANSETRON 2 MG/ML
4 INJECTION INTRAMUSCULAR; INTRAVENOUS ONCE
Status: DISCONTINUED | OUTPATIENT
Start: 2017-01-01 | End: 2017-01-01 | Stop reason: SDUPTHER

## 2017-01-01 RX ORDER — NALOXONE HYDROCHLORIDE 0.4 MG/ML
0.2 INJECTION, SOLUTION INTRAMUSCULAR; INTRAVENOUS; SUBCUTANEOUS AS NEEDED
Status: DISCONTINUED | OUTPATIENT
Start: 2017-01-01 | End: 2017-01-01 | Stop reason: HOSPADM

## 2017-01-01 RX ORDER — SODIUM CHLORIDE 0.9 % (FLUSH) 0.9 %
5-10 SYRINGE (ML) INJECTION AS NEEDED
Status: DISCONTINUED | OUTPATIENT
Start: 2017-01-01 | End: 2017-01-01 | Stop reason: HOSPADM

## 2017-01-01 RX ORDER — ENOXAPARIN SODIUM 100 MG/ML
40 INJECTION SUBCUTANEOUS EVERY 24 HOURS
Status: DISCONTINUED | OUTPATIENT
Start: 2017-01-01 | End: 2017-01-01 | Stop reason: HOSPADM

## 2017-01-01 RX ORDER — FLUMAZENIL 0.1 MG/ML
0.2 INJECTION INTRAVENOUS AS NEEDED
Status: DISCONTINUED | OUTPATIENT
Start: 2017-01-01 | End: 2017-01-01 | Stop reason: ALTCHOICE

## 2017-01-01 RX ORDER — FENTANYL CITRATE 50 UG/ML
INJECTION, SOLUTION INTRAMUSCULAR; INTRAVENOUS AS NEEDED
Status: DISCONTINUED | OUTPATIENT
Start: 2017-01-01 | End: 2017-01-01 | Stop reason: HOSPADM

## 2017-01-01 RX ORDER — MORPHINE SULFATE 4 MG/ML
1 INJECTION, SOLUTION INTRAMUSCULAR; INTRAVENOUS
Status: DISCONTINUED | OUTPATIENT
Start: 2017-01-01 | End: 2017-01-01 | Stop reason: HOSPADM

## 2017-01-01 RX ORDER — INSULIN LISPRO 100 [IU]/ML
INJECTION, SOLUTION INTRAVENOUS; SUBCUTANEOUS ONCE
Status: DISCONTINUED | OUTPATIENT
Start: 2017-01-01 | End: 2017-01-01 | Stop reason: HOSPADM

## 2017-01-01 RX ORDER — METFORMIN HYDROCHLORIDE 1000 MG/1
1000 TABLET ORAL 2 TIMES DAILY WITH MEALS
Qty: 60 TAB | Refills: 1 | Status: SHIPPED | OUTPATIENT
Start: 2017-01-01

## 2017-01-01 RX ORDER — MAGNESIUM SULFATE 100 %
4 CRYSTALS MISCELLANEOUS AS NEEDED
Status: DISCONTINUED | OUTPATIENT
Start: 2017-01-01 | End: 2017-01-01 | Stop reason: SDUPTHER

## 2017-01-01 RX ORDER — GLIPIZIDE 5 MG/1
10 TABLET ORAL
Status: DISCONTINUED | OUTPATIENT
Start: 2017-01-01 | End: 2017-01-01 | Stop reason: HOSPADM

## 2017-01-01 RX ORDER — HYDROCODONE BITARTRATE AND ACETAMINOPHEN 5; 325 MG/1; MG/1
1 TABLET ORAL
Status: DISCONTINUED | OUTPATIENT
Start: 2017-01-01 | End: 2017-01-01 | Stop reason: HOSPADM

## 2017-01-01 RX ORDER — METFORMIN HYDROCHLORIDE 500 MG/1
500 TABLET ORAL 2 TIMES DAILY WITH MEALS
Status: DISCONTINUED | OUTPATIENT
Start: 2017-01-01 | End: 2017-01-01

## 2017-01-01 RX ORDER — LIDOCAINE HYDROCHLORIDE 20 MG/ML
INJECTION, SOLUTION EPIDURAL; INFILTRATION; INTRACAUDAL; PERINEURAL AS NEEDED
Status: DISCONTINUED | OUTPATIENT
Start: 2017-01-01 | End: 2017-01-01 | Stop reason: HOSPADM

## 2017-01-01 RX ORDER — DIPHENHYDRAMINE HYDROCHLORIDE 50 MG/ML
12.5 INJECTION, SOLUTION INTRAMUSCULAR; INTRAVENOUS
Status: DISCONTINUED | OUTPATIENT
Start: 2017-01-01 | End: 2017-01-01 | Stop reason: HOSPADM

## 2017-01-01 RX ORDER — DIPHENHYDRAMINE HYDROCHLORIDE 50 MG/ML
12.5 INJECTION, SOLUTION INTRAMUSCULAR; INTRAVENOUS
Status: DISCONTINUED | OUTPATIENT
Start: 2017-01-01 | End: 2017-01-01 | Stop reason: SDUPTHER

## 2017-01-01 RX ORDER — METFORMIN HYDROCHLORIDE 500 MG/1
1000 TABLET ORAL 2 TIMES DAILY WITH MEALS
Status: DISCONTINUED | OUTPATIENT
Start: 2017-01-01 | End: 2017-01-01 | Stop reason: HOSPADM

## 2017-01-01 RX ORDER — PROPOFOL 10 MG/ML
INJECTION, EMULSION INTRAVENOUS AS NEEDED
Status: DISCONTINUED | OUTPATIENT
Start: 2017-01-01 | End: 2017-01-01 | Stop reason: HOSPADM

## 2017-01-01 RX ORDER — ALBUTEROL SULFATE 0.83 MG/ML
2.5 SOLUTION RESPIRATORY (INHALATION) AS NEEDED
Status: DISCONTINUED | OUTPATIENT
Start: 2017-01-01 | End: 2017-01-01 | Stop reason: HOSPADM

## 2017-01-01 RX ORDER — HYDROCODONE BITARTRATE AND ACETAMINOPHEN 5; 325 MG/1; MG/1
1 TABLET ORAL AS NEEDED
Status: DISCONTINUED | OUTPATIENT
Start: 2017-01-01 | End: 2017-01-01 | Stop reason: HOSPADM

## 2017-01-01 RX ORDER — HYDROMORPHONE HYDROCHLORIDE 2 MG/ML
0.5 INJECTION, SOLUTION INTRAMUSCULAR; INTRAVENOUS; SUBCUTANEOUS
Status: DISCONTINUED | OUTPATIENT
Start: 2017-01-01 | End: 2017-01-01 | Stop reason: HOSPADM

## 2017-01-01 RX ORDER — NITROGLYCERIN 40 MG/100ML
10000 INJECTION INTRAVENOUS
Status: DISCONTINUED | OUTPATIENT
Start: 2017-01-01 | End: 2017-01-01 | Stop reason: ALTCHOICE

## 2017-01-01 RX ORDER — SODIUM CHLORIDE, SODIUM LACTATE, POTASSIUM CHLORIDE, CALCIUM CHLORIDE 600; 310; 30; 20 MG/100ML; MG/100ML; MG/100ML; MG/100ML
150 INJECTION, SOLUTION INTRAVENOUS CONTINUOUS
Status: DISCONTINUED | OUTPATIENT
Start: 2017-01-01 | End: 2017-01-01 | Stop reason: HOSPADM

## 2017-01-01 RX ORDER — DEXTROSE 50 % IN WATER (D50W) INTRAVENOUS SYRINGE
25-50 AS NEEDED
Status: DISCONTINUED | OUTPATIENT
Start: 2017-01-01 | End: 2017-01-01 | Stop reason: SDUPTHER

## 2017-01-01 RX ORDER — SODIUM CHLORIDE, SODIUM LACTATE, POTASSIUM CHLORIDE, CALCIUM CHLORIDE 600; 310; 30; 20 MG/100ML; MG/100ML; MG/100ML; MG/100ML
125 INJECTION, SOLUTION INTRAVENOUS CONTINUOUS
Status: DISCONTINUED | OUTPATIENT
Start: 2017-01-01 | End: 2017-01-01

## 2017-01-01 RX ORDER — INSULIN LISPRO 100 [IU]/ML
INJECTION, SOLUTION INTRAVENOUS; SUBCUTANEOUS
Status: DISCONTINUED | OUTPATIENT
Start: 2017-01-01 | End: 2017-01-01

## 2017-01-01 RX ORDER — INSULIN LISPRO 100 [IU]/ML
INJECTION, SOLUTION INTRAVENOUS; SUBCUTANEOUS ONCE
Status: ACTIVE | OUTPATIENT
Start: 2017-01-01 | End: 2017-01-01

## 2017-01-01 RX ORDER — HEPARIN SODIUM 1000 [USP'U]/ML
INJECTION, SOLUTION INTRAVENOUS; SUBCUTANEOUS
Status: COMPLETED
Start: 2017-01-01 | End: 2017-01-01

## 2017-01-01 RX ORDER — INSULIN GLARGINE 100 [IU]/ML
28 INJECTION, SOLUTION SUBCUTANEOUS DAILY
Status: DISCONTINUED | OUTPATIENT
Start: 2017-01-01 | End: 2017-01-01

## 2017-01-01 RX ORDER — SODIUM CHLORIDE 9 MG/ML
100 INJECTION, SOLUTION INTRAVENOUS CONTINUOUS
Status: DISPENSED | OUTPATIENT
Start: 2017-01-01 | End: 2017-01-01

## 2017-01-01 RX ORDER — MIDAZOLAM HYDROCHLORIDE 1 MG/ML
INJECTION, SOLUTION INTRAMUSCULAR; INTRAVENOUS AS NEEDED
Status: DISCONTINUED | OUTPATIENT
Start: 2017-01-01 | End: 2017-01-01 | Stop reason: HOSPADM

## 2017-01-01 RX ORDER — HYDROMORPHONE HYDROCHLORIDE 2 MG/ML
0.5 INJECTION, SOLUTION INTRAMUSCULAR; INTRAVENOUS; SUBCUTANEOUS
Status: DISCONTINUED | OUTPATIENT
Start: 2017-01-01 | End: 2017-01-01 | Stop reason: SDUPTHER

## 2017-01-01 RX ORDER — MAGNESIUM SULFATE 100 %
4 CRYSTALS MISCELLANEOUS AS NEEDED
Status: DISCONTINUED | OUTPATIENT
Start: 2017-01-01 | End: 2017-01-01 | Stop reason: HOSPADM

## 2017-01-01 RX ORDER — ATORVASTATIN CALCIUM 20 MG/1
40 TABLET, FILM COATED ORAL DAILY
Status: DISCONTINUED | OUTPATIENT
Start: 2017-01-01 | End: 2017-01-01 | Stop reason: HOSPADM

## 2017-01-01 RX ORDER — HYDROCODONE BITARTRATE AND ACETAMINOPHEN 5; 325 MG/1; MG/1
1 TABLET ORAL AS NEEDED
Status: DISCONTINUED | OUTPATIENT
Start: 2017-01-01 | End: 2017-01-01 | Stop reason: SDUPTHER

## 2017-01-01 RX ORDER — PHENYLEPHRINE HCL IN 0.9% NACL 30MG/250ML
PLASTIC BAG, INJECTION (ML) INTRAVENOUS
Status: DISCONTINUED | OUTPATIENT
Start: 2017-01-01 | End: 2017-01-01 | Stop reason: HOSPADM

## 2017-01-01 RX ORDER — ONDANSETRON 4 MG/1
4 TABLET, ORALLY DISINTEGRATING ORAL
Status: DISCONTINUED | OUTPATIENT
Start: 2017-01-01 | End: 2017-01-01 | Stop reason: HOSPADM

## 2017-01-01 RX ORDER — FENTANYL CITRATE 50 UG/ML
25 INJECTION, SOLUTION INTRAMUSCULAR; INTRAVENOUS
Status: DISCONTINUED | OUTPATIENT
Start: 2017-01-01 | End: 2017-01-01

## 2017-01-01 RX ORDER — ONDANSETRON 2 MG/ML
INJECTION INTRAMUSCULAR; INTRAVENOUS AS NEEDED
Status: DISCONTINUED | OUTPATIENT
Start: 2017-01-01 | End: 2017-01-01 | Stop reason: HOSPADM

## 2017-01-01 RX ORDER — ACETAMINOPHEN 325 MG/1
650 TABLET ORAL
Status: COMPLETED | OUTPATIENT
Start: 2017-01-01 | End: 2017-01-01

## 2017-01-01 RX ORDER — NALOXONE HYDROCHLORIDE 0.4 MG/ML
0.2 INJECTION, SOLUTION INTRAMUSCULAR; INTRAVENOUS; SUBCUTANEOUS
Status: DISCONTINUED | OUTPATIENT
Start: 2017-01-01 | End: 2017-01-01 | Stop reason: ALTCHOICE

## 2017-01-01 RX ORDER — INSULIN GLARGINE 100 [IU]/ML
34 INJECTION, SOLUTION SUBCUTANEOUS DAILY
Status: DISCONTINUED | OUTPATIENT
Start: 2017-01-01 | End: 2017-01-01

## 2017-01-01 RX ORDER — OXYCODONE AND ACETAMINOPHEN 5; 325 MG/1; MG/1
1 TABLET ORAL
Status: DISCONTINUED | OUTPATIENT
Start: 2017-01-01 | End: 2017-01-01 | Stop reason: HOSPADM

## 2017-01-01 RX ORDER — NALOXONE HYDROCHLORIDE 0.4 MG/ML
0.1 INJECTION, SOLUTION INTRAMUSCULAR; INTRAVENOUS; SUBCUTANEOUS AS NEEDED
Status: DISCONTINUED | OUTPATIENT
Start: 2017-01-01 | End: 2017-01-01 | Stop reason: SDUPTHER

## 2017-01-01 RX ORDER — SODIUM CHLORIDE, SODIUM LACTATE, POTASSIUM CHLORIDE, CALCIUM CHLORIDE 600; 310; 30; 20 MG/100ML; MG/100ML; MG/100ML; MG/100ML
100 INJECTION, SOLUTION INTRAVENOUS CONTINUOUS
Status: DISCONTINUED | OUTPATIENT
Start: 2017-01-01 | End: 2017-01-01

## 2017-01-01 RX ORDER — FENTANYL CITRATE 50 UG/ML
25-200 INJECTION, SOLUTION INTRAMUSCULAR; INTRAVENOUS
Status: DISCONTINUED | OUTPATIENT
Start: 2017-01-01 | End: 2017-01-01 | Stop reason: ALTCHOICE

## 2017-01-01 RX ORDER — ACETAMINOPHEN 325 MG/1
650 TABLET ORAL
Status: DISCONTINUED | OUTPATIENT
Start: 2017-01-01 | End: 2017-01-01 | Stop reason: HOSPADM

## 2017-01-01 RX ORDER — INSULIN GLARGINE 100 [IU]/ML
24 INJECTION, SOLUTION SUBCUTANEOUS DAILY
Status: DISCONTINUED | OUTPATIENT
Start: 2017-01-01 | End: 2017-01-01

## 2017-01-01 RX ORDER — EPHEDRINE SULFATE/0.9% NACL/PF 25 MG/5 ML
SYRINGE (ML) INTRAVENOUS AS NEEDED
Status: DISCONTINUED | OUTPATIENT
Start: 2017-01-01 | End: 2017-01-01 | Stop reason: HOSPADM

## 2017-01-01 RX ORDER — SODIUM CHLORIDE 0.9 % (FLUSH) 0.9 %
5-10 SYRINGE (ML) INJECTION AS NEEDED
Status: DISCONTINUED | OUTPATIENT
Start: 2017-01-01 | End: 2017-01-01 | Stop reason: SDUPTHER

## 2017-01-01 RX ORDER — NITROGLYCERIN 10 MG/100ML
INJECTION INTRAVENOUS
Status: DISCONTINUED
Start: 2017-01-01 | End: 2017-01-01 | Stop reason: ALTCHOICE

## 2017-01-01 RX ORDER — VERAPAMIL HYDROCHLORIDE 2.5 MG/ML
INJECTION, SOLUTION INTRAVENOUS
Status: COMPLETED
Start: 2017-01-01 | End: 2017-01-01

## 2017-01-01 RX ORDER — CLOPIDOGREL BISULFATE 75 MG/1
75 TABLET ORAL DAILY
Qty: 30 TAB | Refills: 1 | Status: SHIPPED | OUTPATIENT
Start: 2017-01-01

## 2017-01-01 RX ORDER — ONDANSETRON 2 MG/ML
4 INJECTION INTRAMUSCULAR; INTRAVENOUS ONCE
Status: DISCONTINUED | OUTPATIENT
Start: 2017-01-01 | End: 2017-01-01 | Stop reason: HOSPADM

## 2017-01-01 RX ORDER — DEXTROSE 50 % IN WATER (D50W) INTRAVENOUS SYRINGE
25-50 AS NEEDED
Status: DISCONTINUED | OUTPATIENT
Start: 2017-01-01 | End: 2017-01-01 | Stop reason: HOSPADM

## 2017-01-01 RX ORDER — INSULIN LISPRO 100 [IU]/ML
8 INJECTION, SOLUTION INTRAVENOUS; SUBCUTANEOUS
Status: DISCONTINUED | OUTPATIENT
Start: 2017-01-01 | End: 2017-01-01

## 2017-01-01 RX ORDER — CLOPIDOGREL BISULFATE 75 MG/1
300 TABLET ORAL ONCE
Status: COMPLETED | OUTPATIENT
Start: 2017-01-01 | End: 2017-01-01

## 2017-01-01 RX ORDER — AMOXICILLIN AND CLAVULANATE POTASSIUM 875; 125 MG/1; MG/1
1 TABLET, FILM COATED ORAL 2 TIMES DAILY
Qty: 14 TAB | Refills: 0 | Status: SHIPPED | OUTPATIENT
Start: 2017-01-01

## 2017-01-01 RX ORDER — ONDANSETRON 2 MG/ML
4 INJECTION INTRAMUSCULAR; INTRAVENOUS
Status: DISCONTINUED | OUTPATIENT
Start: 2017-01-01 | End: 2017-01-01 | Stop reason: HOSPADM

## 2017-01-01 RX ORDER — INSULIN GLARGINE 100 [IU]/ML
30 INJECTION, SOLUTION SUBCUTANEOUS DAILY
Status: DISCONTINUED | OUTPATIENT
Start: 2017-01-01 | End: 2017-01-01

## 2017-01-01 RX ORDER — SODIUM CHLORIDE 9 MG/ML
25 INJECTION, SOLUTION INTRAVENOUS CONTINUOUS
Status: DISCONTINUED | OUTPATIENT
Start: 2017-01-01 | End: 2017-01-01 | Stop reason: ALTCHOICE

## 2017-01-01 RX ORDER — INSULIN GLARGINE 100 [IU]/ML
9 INJECTION, SOLUTION SUBCUTANEOUS ONCE
Status: COMPLETED | OUTPATIENT
Start: 2017-01-01 | End: 2017-01-01

## 2017-01-01 RX ORDER — INSULIN GLARGINE 100 [IU]/ML
15 INJECTION, SOLUTION SUBCUTANEOUS DAILY
Status: DISCONTINUED | OUTPATIENT
Start: 2017-01-01 | End: 2017-01-01

## 2017-01-01 RX ORDER — VANCOMYCIN HYDROCHLORIDE
1250 EVERY 8 HOURS
Status: DISCONTINUED | OUTPATIENT
Start: 2017-01-01 | End: 2017-01-01 | Stop reason: DRUGHIGH

## 2017-01-01 RX ORDER — LEVOFLOXACIN 5 MG/ML
750 INJECTION, SOLUTION INTRAVENOUS EVERY 24 HOURS
Status: DISCONTINUED | OUTPATIENT
Start: 2017-01-01 | End: 2017-01-01

## 2017-01-01 RX ORDER — NALOXONE HYDROCHLORIDE 0.4 MG/ML
0.1 INJECTION, SOLUTION INTRAMUSCULAR; INTRAVENOUS; SUBCUTANEOUS AS NEEDED
Status: DISCONTINUED | OUTPATIENT
Start: 2017-01-01 | End: 2017-01-01 | Stop reason: HOSPADM

## 2017-01-01 RX ORDER — LIDOCAINE HYDROCHLORIDE 10 MG/ML
1-10 INJECTION, SOLUTION EPIDURAL; INFILTRATION; INTRACAUDAL; PERINEURAL
Status: COMPLETED | OUTPATIENT
Start: 2017-01-01 | End: 2017-01-01

## 2017-01-01 RX ORDER — INSULIN GLARGINE 100 [IU]/ML
20 INJECTION, SOLUTION SUBCUTANEOUS DAILY
Status: DISCONTINUED | OUTPATIENT
Start: 2017-01-01 | End: 2017-01-01

## 2017-01-01 RX ORDER — SIMVASTATIN 40 MG/1
40 TABLET, FILM COATED ORAL
Qty: 30 TAB | Refills: 1 | Status: SHIPPED | OUTPATIENT
Start: 2017-01-01

## 2017-01-01 RX ORDER — GLIPIZIDE 10 MG/1
10 TABLET ORAL
Qty: 60 TAB | Refills: 1 | Status: SHIPPED | OUTPATIENT
Start: 2017-01-01

## 2017-01-01 RX ORDER — SODIUM CHLORIDE 0.9 % (FLUSH) 0.9 %
5-10 SYRINGE (ML) INJECTION AS NEEDED
Status: DISCONTINUED | OUTPATIENT
Start: 2017-01-01 | End: 2017-01-01

## 2017-01-01 RX ORDER — INSULIN LISPRO 100 [IU]/ML
10 INJECTION, SOLUTION INTRAVENOUS; SUBCUTANEOUS
Status: DISCONTINUED | OUTPATIENT
Start: 2017-01-01 | End: 2017-01-01

## 2017-01-01 RX ORDER — INSULIN GLARGINE 100 [IU]/ML
40 INJECTION, SOLUTION SUBCUTANEOUS DAILY
Status: DISCONTINUED | OUTPATIENT
Start: 2017-01-01 | End: 2017-01-01

## 2017-01-01 RX ORDER — ONDANSETRON 2 MG/ML
4 INJECTION INTRAMUSCULAR; INTRAVENOUS
Status: DISCONTINUED | OUTPATIENT
Start: 2017-01-01 | End: 2017-01-01

## 2017-01-01 RX ORDER — INSULIN LISPRO 100 [IU]/ML
INJECTION, SOLUTION INTRAVENOUS; SUBCUTANEOUS
Status: DISCONTINUED | OUTPATIENT
Start: 2017-01-01 | End: 2017-01-01 | Stop reason: HOSPADM

## 2017-01-01 RX ORDER — CLOPIDOGREL BISULFATE 75 MG/1
75 TABLET ORAL DAILY
Status: DISCONTINUED | OUTPATIENT
Start: 2017-01-01 | End: 2017-01-01 | Stop reason: HOSPADM

## 2017-01-01 RX ORDER — SODIUM CHLORIDE, SODIUM LACTATE, POTASSIUM CHLORIDE, CALCIUM CHLORIDE 600; 310; 30; 20 MG/100ML; MG/100ML; MG/100ML; MG/100ML
INJECTION, SOLUTION INTRAVENOUS
Status: DISCONTINUED | OUTPATIENT
Start: 2017-01-01 | End: 2017-01-01 | Stop reason: HOSPADM

## 2017-01-01 RX ADMIN — INSULIN LISPRO 10 UNITS: 100 INJECTION, SOLUTION INTRAVENOUS; SUBCUTANEOUS at 12:36

## 2017-01-01 RX ADMIN — INSULIN GLARGINE 34 UNITS: 100 INJECTION, SOLUTION SUBCUTANEOUS at 08:45

## 2017-01-01 RX ADMIN — INSULIN LISPRO 8 UNITS: 100 INJECTION, SOLUTION INTRAVENOUS; SUBCUTANEOUS at 12:02

## 2017-01-01 RX ADMIN — INSULIN LISPRO 5 UNITS: 100 INJECTION, SOLUTION INTRAVENOUS; SUBCUTANEOUS at 09:42

## 2017-01-01 RX ADMIN — SODIUM CHLORIDE 100 ML/HR: 900 INJECTION, SOLUTION INTRAVENOUS at 12:40

## 2017-01-01 RX ADMIN — INSULIN LISPRO 6 UNITS: 100 INJECTION, SOLUTION INTRAVENOUS; SUBCUTANEOUS at 07:53

## 2017-01-01 RX ADMIN — VANCOMYCIN HYDROCHLORIDE 1250 MG: 10 INJECTION, POWDER, LYOPHILIZED, FOR SOLUTION INTRAVENOUS at 23:14

## 2017-01-01 RX ADMIN — ATORVASTATIN CALCIUM 40 MG: 20 TABLET, FILM COATED ORAL at 08:06

## 2017-01-01 RX ADMIN — SODIUM CHLORIDE 75 ML/HR: 900 INJECTION, SOLUTION INTRAVENOUS at 16:34

## 2017-01-01 RX ADMIN — INSULIN LISPRO 9 UNITS: 100 INJECTION, SOLUTION INTRAVENOUS; SUBCUTANEOUS at 13:06

## 2017-01-01 RX ADMIN — METFORMIN HYDROCHLORIDE 1000 MG: 500 TABLET, FILM COATED ORAL at 08:37

## 2017-01-01 RX ADMIN — HYDROCODONE BITARTRATE AND ACETAMINOPHEN 1 TABLET: 5; 325 TABLET ORAL at 03:51

## 2017-01-01 RX ADMIN — INSULIN LISPRO 9 UNITS: 100 INJECTION, SOLUTION INTRAVENOUS; SUBCUTANEOUS at 12:03

## 2017-01-01 RX ADMIN — SODIUM CHLORIDE 100 ML/HR: 900 INJECTION, SOLUTION INTRAVENOUS at 12:38

## 2017-01-01 RX ADMIN — VANCOMYCIN HYDROCHLORIDE 1250 MG: 10 INJECTION, POWDER, LYOPHILIZED, FOR SOLUTION INTRAVENOUS at 07:12

## 2017-01-01 RX ADMIN — INSULIN LISPRO 4 UNITS: 100 INJECTION, SOLUTION INTRAVENOUS; SUBCUTANEOUS at 16:48

## 2017-01-01 RX ADMIN — SODIUM CHLORIDE 100 ML/HR: 900 INJECTION, SOLUTION INTRAVENOUS at 04:19

## 2017-01-01 RX ADMIN — SODIUM CHLORIDE 100 ML/HR: 900 INJECTION, SOLUTION INTRAVENOUS at 12:42

## 2017-01-01 RX ADMIN — SODIUM CHLORIDE, SODIUM LACTATE, POTASSIUM CHLORIDE, AND CALCIUM CHLORIDE 125 ML/HR: 600; 310; 30; 20 INJECTION, SOLUTION INTRAVENOUS at 05:51

## 2017-01-01 RX ADMIN — HYDROCODONE BITARTRATE AND ACETAMINOPHEN 1 TABLET: 5; 325 TABLET ORAL at 10:32

## 2017-01-01 RX ADMIN — INSULIN LISPRO 10 UNITS: 100 INJECTION, SOLUTION INTRAVENOUS; SUBCUTANEOUS at 16:33

## 2017-01-01 RX ADMIN — SODIUM CHLORIDE, SODIUM LACTATE, POTASSIUM CHLORIDE, AND CALCIUM CHLORIDE 125 ML/HR: 600; 310; 30; 20 INJECTION, SOLUTION INTRAVENOUS at 16:45

## 2017-01-01 RX ADMIN — ENOXAPARIN SODIUM 40 MG: 40 INJECTION SUBCUTANEOUS at 21:25

## 2017-01-01 RX ADMIN — LEVOFLOXACIN 750 MG: 5 INJECTION, SOLUTION INTRAVENOUS at 21:25

## 2017-01-01 RX ADMIN — VANCOMYCIN HYDROCHLORIDE 1250 MG: 10 INJECTION, POWDER, LYOPHILIZED, FOR SOLUTION INTRAVENOUS at 06:23

## 2017-01-01 RX ADMIN — VANCOMYCIN HYDROCHLORIDE 1250 MG: 10 INJECTION, POWDER, LYOPHILIZED, FOR SOLUTION INTRAVENOUS at 15:36

## 2017-01-01 RX ADMIN — SODIUM CHLORIDE 100 ML/HR: 900 INJECTION, SOLUTION INTRAVENOUS at 16:34

## 2017-01-01 RX ADMIN — INSULIN LISPRO 8 UNITS: 100 INJECTION, SOLUTION INTRAVENOUS; SUBCUTANEOUS at 11:30

## 2017-01-01 RX ADMIN — HYDROCODONE BITARTRATE AND ACETAMINOPHEN 1 TABLET: 5; 325 TABLET ORAL at 02:20

## 2017-01-01 RX ADMIN — SODIUM CHLORIDE, SODIUM LACTATE, POTASSIUM CHLORIDE, AND CALCIUM CHLORIDE 125 ML/HR: 600; 310; 30; 20 INJECTION, SOLUTION INTRAVENOUS at 19:26

## 2017-01-01 RX ADMIN — NITROGLYCERIN 800 MCG: 40 INJECTION INTRAVENOUS at 15:54

## 2017-01-01 RX ADMIN — FENTANYL CITRATE 25 MCG: 50 INJECTION, SOLUTION INTRAMUSCULAR; INTRAVENOUS at 15:42

## 2017-01-01 RX ADMIN — HYDROCODONE BITARTRATE AND ACETAMINOPHEN 1 TABLET: 5; 325 TABLET ORAL at 20:05

## 2017-01-01 RX ADMIN — ENOXAPARIN SODIUM 40 MG: 40 INJECTION SUBCUTANEOUS at 21:35

## 2017-01-01 RX ADMIN — MULTIPLE VITAMINS W/ MINERALS TAB 1 TABLET: TAB at 08:37

## 2017-01-01 RX ADMIN — HYDROCODONE BITARTRATE AND ACETAMINOPHEN 1 TABLET: 5; 325 TABLET ORAL at 12:06

## 2017-01-01 RX ADMIN — INSULIN LISPRO 3 UNITS: 100 INJECTION, SOLUTION INTRAVENOUS; SUBCUTANEOUS at 17:21

## 2017-01-01 RX ADMIN — OXYCODONE AND ACETAMINOPHEN 1 TABLET: 5; 325 TABLET ORAL at 17:12

## 2017-01-01 RX ADMIN — VANCOMYCIN HYDROCHLORIDE 1250 MG: 10 INJECTION, POWDER, LYOPHILIZED, FOR SOLUTION INTRAVENOUS at 10:07

## 2017-01-01 RX ADMIN — GADOBUTROL 7.5 ML: 604.72 INJECTION INTRAVENOUS at 12:24

## 2017-01-01 RX ADMIN — ATORVASTATIN CALCIUM 40 MG: 20 TABLET, FILM COATED ORAL at 08:37

## 2017-01-01 RX ADMIN — Medication 10 MG: at 09:22

## 2017-01-01 RX ADMIN — VANCOMYCIN HYDROCHLORIDE 1250 MG: 10 INJECTION, POWDER, LYOPHILIZED, FOR SOLUTION INTRAVENOUS at 08:37

## 2017-01-01 RX ADMIN — ENOXAPARIN SODIUM 40 MG: 40 INJECTION SUBCUTANEOUS at 21:33

## 2017-01-01 RX ADMIN — INSULIN LISPRO 10 UNITS: 100 INJECTION, SOLUTION INTRAVENOUS; SUBCUTANEOUS at 13:43

## 2017-01-01 RX ADMIN — HYDROCODONE BITARTRATE AND ACETAMINOPHEN 1 TABLET: 5; 325 TABLET ORAL at 20:45

## 2017-01-01 RX ADMIN — METFORMIN HYDROCHLORIDE 1000 MG: 500 TABLET, FILM COATED ORAL at 08:07

## 2017-01-01 RX ADMIN — INSULIN GLARGINE 24 UNITS: 100 INJECTION, SOLUTION SUBCUTANEOUS at 09:00

## 2017-01-01 RX ADMIN — VANCOMYCIN HYDROCHLORIDE 1250 MG: 10 INJECTION, POWDER, LYOPHILIZED, FOR SOLUTION INTRAVENOUS at 11:14

## 2017-01-01 RX ADMIN — VANCOMYCIN HYDROCHLORIDE 1250 MG: 10 INJECTION, POWDER, LYOPHILIZED, FOR SOLUTION INTRAVENOUS at 06:35

## 2017-01-01 RX ADMIN — INSULIN LISPRO 10 UNITS: 100 INJECTION, SOLUTION INTRAVENOUS; SUBCUTANEOUS at 17:17

## 2017-01-01 RX ADMIN — INSULIN LISPRO 2 UNITS: 100 INJECTION, SOLUTION INTRAVENOUS; SUBCUTANEOUS at 21:33

## 2017-01-01 RX ADMIN — MIDAZOLAM HYDROCHLORIDE 2 MG: 1 INJECTION, SOLUTION INTRAMUSCULAR; INTRAVENOUS at 17:25

## 2017-01-01 RX ADMIN — CLOPIDOGREL BISULFATE 75 MG: 75 TABLET, FILM COATED ORAL at 08:24

## 2017-01-01 RX ADMIN — VANCOMYCIN HYDROCHLORIDE 1250 MG: 10 INJECTION, POWDER, LYOPHILIZED, FOR SOLUTION INTRAVENOUS at 16:18

## 2017-01-01 RX ADMIN — SODIUM CHLORIDE 1000 MG: 900 INJECTION, SOLUTION INTRAVENOUS at 20:05

## 2017-01-01 RX ADMIN — SODIUM CHLORIDE 75 ML/HR: 900 INJECTION, SOLUTION INTRAVENOUS at 13:38

## 2017-01-01 RX ADMIN — MIDAZOLAM HYDROCHLORIDE 0.5 MG: 1 INJECTION, SOLUTION INTRAMUSCULAR; INTRAVENOUS at 15:21

## 2017-01-01 RX ADMIN — ATORVASTATIN CALCIUM 40 MG: 20 TABLET, FILM COATED ORAL at 08:24

## 2017-01-01 RX ADMIN — SODIUM CHLORIDE 100 ML/HR: 900 INJECTION, SOLUTION INTRAVENOUS at 06:23

## 2017-01-01 RX ADMIN — SODIUM CHLORIDE 100 ML/HR: 900 INJECTION, SOLUTION INTRAVENOUS at 22:14

## 2017-01-01 RX ADMIN — HYDROCODONE BITARTRATE AND ACETAMINOPHEN 1 TABLET: 5; 325 TABLET ORAL at 18:28

## 2017-01-01 RX ADMIN — INSULIN LISPRO 2 UNITS: 100 INJECTION, SOLUTION INTRAVENOUS; SUBCUTANEOUS at 22:21

## 2017-01-01 RX ADMIN — CLOPIDOGREL BISULFATE 75 MG: 75 TABLET, FILM COATED ORAL at 08:37

## 2017-01-01 RX ADMIN — LEVOFLOXACIN 750 MG: 5 INJECTION, SOLUTION INTRAVENOUS at 17:22

## 2017-01-01 RX ADMIN — LEVOFLOXACIN 750 MG: 5 INJECTION, SOLUTION INTRAVENOUS at 22:23

## 2017-01-01 RX ADMIN — FENTANYL CITRATE 25 MCG: 50 INJECTION, SOLUTION INTRAMUSCULAR; INTRAVENOUS at 15:01

## 2017-01-01 RX ADMIN — FENTANYL CITRATE 25 MCG: 50 INJECTION, SOLUTION INTRAMUSCULAR; INTRAVENOUS at 15:27

## 2017-01-01 RX ADMIN — LEVOFLOXACIN 750 MG: 5 INJECTION, SOLUTION INTRAVENOUS at 21:46

## 2017-01-01 RX ADMIN — VANCOMYCIN HYDROCHLORIDE 1250 MG: 10 INJECTION, POWDER, LYOPHILIZED, FOR SOLUTION INTRAVENOUS at 22:56

## 2017-01-01 RX ADMIN — INSULIN LISPRO 6 UNITS: 100 INJECTION, SOLUTION INTRAVENOUS; SUBCUTANEOUS at 11:30

## 2017-01-01 RX ADMIN — INSULIN LISPRO 6 UNITS: 100 INJECTION, SOLUTION INTRAVENOUS; SUBCUTANEOUS at 21:13

## 2017-01-01 RX ADMIN — ATORVASTATIN CALCIUM 40 MG: 20 TABLET, FILM COATED ORAL at 09:25

## 2017-01-01 RX ADMIN — SODIUM CHLORIDE 25 ML/HR: 900 INJECTION, SOLUTION INTRAVENOUS at 15:20

## 2017-01-01 RX ADMIN — LEVOFLOXACIN 750 MG: 5 INJECTION, SOLUTION INTRAVENOUS at 22:21

## 2017-01-01 RX ADMIN — INSULIN LISPRO 10 UNITS: 100 INJECTION, SOLUTION INTRAVENOUS; SUBCUTANEOUS at 09:55

## 2017-01-01 RX ADMIN — INSULIN LISPRO 2 UNITS: 100 INJECTION, SOLUTION INTRAVENOUS; SUBCUTANEOUS at 08:49

## 2017-01-01 RX ADMIN — INSULIN LISPRO 10 UNITS: 100 INJECTION, SOLUTION INTRAVENOUS; SUBCUTANEOUS at 12:06

## 2017-01-01 RX ADMIN — FENTANYL CITRATE 25 MCG: 50 INJECTION, SOLUTION INTRAMUSCULAR; INTRAVENOUS at 15:44

## 2017-01-01 RX ADMIN — ENOXAPARIN SODIUM 40 MG: 40 INJECTION SUBCUTANEOUS at 22:11

## 2017-01-01 RX ADMIN — FENTANYL CITRATE 50 MCG: 50 INJECTION, SOLUTION INTRAMUSCULAR; INTRAVENOUS at 17:54

## 2017-01-01 RX ADMIN — HYDROCODONE BITARTRATE AND ACETAMINOPHEN 1 TABLET: 5; 325 TABLET ORAL at 04:25

## 2017-01-01 RX ADMIN — VANCOMYCIN HYDROCHLORIDE 1250 MG: 10 INJECTION, POWDER, LYOPHILIZED, FOR SOLUTION INTRAVENOUS at 07:45

## 2017-01-01 RX ADMIN — Medication 5 MG: at 10:38

## 2017-01-01 RX ADMIN — MULTIPLE VITAMINS W/ MINERALS TAB 1 TABLET: TAB at 09:41

## 2017-01-01 RX ADMIN — SODIUM CHLORIDE 75 ML/HR: 900 INJECTION, SOLUTION INTRAVENOUS at 10:23

## 2017-01-01 RX ADMIN — HYDROCODONE BITARTRATE AND ACETAMINOPHEN 1 TABLET: 5; 325 TABLET ORAL at 10:14

## 2017-01-01 RX ADMIN — MIDAZOLAM HYDROCHLORIDE 2 MG: 1 INJECTION, SOLUTION INTRAMUSCULAR; INTRAVENOUS at 08:50

## 2017-01-01 RX ADMIN — VANCOMYCIN HYDROCHLORIDE 1250 MG: 10 INJECTION, POWDER, LYOPHILIZED, FOR SOLUTION INTRAVENOUS at 01:06

## 2017-01-01 RX ADMIN — LEVOFLOXACIN 750 MG: 5 INJECTION, SOLUTION INTRAVENOUS at 21:59

## 2017-01-01 RX ADMIN — ACETAMINOPHEN 650 MG: 325 TABLET ORAL at 00:07

## 2017-01-01 RX ADMIN — LEVOFLOXACIN 750 MG: 5 INJECTION, SOLUTION INTRAVENOUS at 21:33

## 2017-01-01 RX ADMIN — INSULIN LISPRO 2 UNITS: 100 INJECTION, SOLUTION INTRAVENOUS; SUBCUTANEOUS at 22:17

## 2017-01-01 RX ADMIN — IOPAMIDOL 90 ML: 510 INJECTION, SOLUTION INTRAVASCULAR at 16:04

## 2017-01-01 RX ADMIN — INSULIN LISPRO 10 UNITS: 100 INJECTION, SOLUTION INTRAVENOUS; SUBCUTANEOUS at 11:30

## 2017-01-01 RX ADMIN — INSULIN LISPRO 5 UNITS: 100 INJECTION, SOLUTION INTRAVENOUS; SUBCUTANEOUS at 18:55

## 2017-01-01 RX ADMIN — HYDROCODONE BITARTRATE AND ACETAMINOPHEN 1 TABLET: 5; 325 TABLET ORAL at 21:01

## 2017-01-01 RX ADMIN — VANCOMYCIN HYDROCHLORIDE 1250 MG: 10 INJECTION, POWDER, LYOPHILIZED, FOR SOLUTION INTRAVENOUS at 08:45

## 2017-01-01 RX ADMIN — INSULIN LISPRO 10 UNITS: 100 INJECTION, SOLUTION INTRAVENOUS; SUBCUTANEOUS at 18:50

## 2017-01-01 RX ADMIN — INSULIN GLARGINE 20 UNITS: 100 INJECTION, SOLUTION SUBCUTANEOUS at 09:41

## 2017-01-01 RX ADMIN — HYDROCODONE BITARTRATE AND ACETAMINOPHEN 1 TABLET: 5; 325 TABLET ORAL at 22:11

## 2017-01-01 RX ADMIN — LEVOFLOXACIN 750 MG: 5 INJECTION, SOLUTION INTRAVENOUS at 16:46

## 2017-01-01 RX ADMIN — LEVOFLOXACIN 750 MG: 5 INJECTION, SOLUTION INTRAVENOUS at 18:56

## 2017-01-01 RX ADMIN — ENOXAPARIN SODIUM 40 MG: 40 INJECTION SUBCUTANEOUS at 21:47

## 2017-01-01 RX ADMIN — MULTIPLE VITAMINS W/ MINERALS TAB 1 TABLET: TAB at 09:51

## 2017-01-01 RX ADMIN — HYDROCODONE BITARTRATE AND ACETAMINOPHEN 1 TABLET: 5; 325 TABLET ORAL at 12:47

## 2017-01-01 RX ADMIN — INSULIN GLARGINE 24 UNITS: 100 INJECTION, SOLUTION SUBCUTANEOUS at 08:27

## 2017-01-01 RX ADMIN — SODIUM CHLORIDE, SODIUM LACTATE, POTASSIUM CHLORIDE, CALCIUM CHLORIDE: 600; 310; 30; 20 INJECTION, SOLUTION INTRAVENOUS at 10:50

## 2017-01-01 RX ADMIN — Medication 10 MG: at 09:05

## 2017-01-01 RX ADMIN — ENOXAPARIN SODIUM 40 MG: 40 INJECTION SUBCUTANEOUS at 21:36

## 2017-01-01 RX ADMIN — INSULIN GLARGINE 40 UNITS: 100 INJECTION, SOLUTION SUBCUTANEOUS at 09:25

## 2017-01-01 RX ADMIN — MIDAZOLAM HYDROCHLORIDE 0.5 MG: 1 INJECTION, SOLUTION INTRAMUSCULAR; INTRAVENOUS at 15:39

## 2017-01-01 RX ADMIN — VANCOMYCIN HYDROCHLORIDE 1250 MG: 10 INJECTION, POWDER, LYOPHILIZED, FOR SOLUTION INTRAVENOUS at 10:22

## 2017-01-01 RX ADMIN — METFORMIN HYDROCHLORIDE 1000 MG: 500 TABLET, FILM COATED ORAL at 16:52

## 2017-01-01 RX ADMIN — HEPARIN SODIUM 7000 UNITS: 1000 INJECTION, SOLUTION INTRAVENOUS; SUBCUTANEOUS at 15:04

## 2017-01-01 RX ADMIN — INSULIN LISPRO 8 UNITS: 100 INJECTION, SOLUTION INTRAVENOUS; SUBCUTANEOUS at 07:52

## 2017-01-01 RX ADMIN — HYDROCODONE BITARTRATE AND ACETAMINOPHEN 1 TABLET: 5; 325 TABLET ORAL at 13:06

## 2017-01-01 RX ADMIN — FENTANYL CITRATE 25 MCG: 50 INJECTION, SOLUTION INTRAMUSCULAR; INTRAVENOUS at 09:01

## 2017-01-01 RX ADMIN — INSULIN LISPRO 10 UNITS: 100 INJECTION, SOLUTION INTRAVENOUS; SUBCUTANEOUS at 12:34

## 2017-01-01 RX ADMIN — SODIUM CHLORIDE 1000 MG: 900 INJECTION, SOLUTION INTRAVENOUS at 12:33

## 2017-01-01 RX ADMIN — INSULIN LISPRO 10 UNITS: 100 INJECTION, SOLUTION INTRAVENOUS; SUBCUTANEOUS at 16:49

## 2017-01-01 RX ADMIN — INSULIN LISPRO 3 UNITS: 100 INJECTION, SOLUTION INTRAVENOUS; SUBCUTANEOUS at 21:34

## 2017-01-01 RX ADMIN — ENOXAPARIN SODIUM 40 MG: 40 INJECTION SUBCUTANEOUS at 22:06

## 2017-01-01 RX ADMIN — INSULIN LISPRO 10 UNITS: 100 INJECTION, SOLUTION INTRAVENOUS; SUBCUTANEOUS at 08:56

## 2017-01-01 RX ADMIN — VANCOMYCIN HYDROCHLORIDE 1750 MG: 10 INJECTION, POWDER, LYOPHILIZED, FOR SOLUTION INTRAVENOUS at 23:51

## 2017-01-01 RX ADMIN — Medication 5 MG: at 10:12

## 2017-01-01 RX ADMIN — ENOXAPARIN SODIUM 40 MG: 40 INJECTION SUBCUTANEOUS at 22:23

## 2017-01-01 RX ADMIN — ENOXAPARIN SODIUM 40 MG: 40 INJECTION SUBCUTANEOUS at 21:13

## 2017-01-01 RX ADMIN — SODIUM CHLORIDE 75 ML/HR: 900 INJECTION, SOLUTION INTRAVENOUS at 08:47

## 2017-01-01 RX ADMIN — Medication 1 MG: at 20:20

## 2017-01-01 RX ADMIN — FENTANYL CITRATE 50 MCG: 50 INJECTION, SOLUTION INTRAMUSCULAR; INTRAVENOUS at 17:51

## 2017-01-01 RX ADMIN — VANCOMYCIN HYDROCHLORIDE 1250 MG: 10 INJECTION, POWDER, LYOPHILIZED, FOR SOLUTION INTRAVENOUS at 17:40

## 2017-01-01 RX ADMIN — INSULIN LISPRO 5 UNITS: 100 INJECTION, SOLUTION INTRAVENOUS; SUBCUTANEOUS at 11:39

## 2017-01-01 RX ADMIN — OXYCODONE AND ACETAMINOPHEN 1 TABLET: 5; 325 TABLET ORAL at 01:30

## 2017-01-01 RX ADMIN — ACETAMINOPHEN 650 MG: 325 TABLET ORAL at 16:16

## 2017-01-01 RX ADMIN — INSULIN LISPRO 10 UNITS: 100 INJECTION, SOLUTION INTRAVENOUS; SUBCUTANEOUS at 17:40

## 2017-01-01 RX ADMIN — INSULIN LISPRO 10 UNITS: 100 INJECTION, SOLUTION INTRAVENOUS; SUBCUTANEOUS at 12:57

## 2017-01-01 RX ADMIN — INSULIN GLARGINE 40 UNITS: 100 INJECTION, SOLUTION SUBCUTANEOUS at 09:50

## 2017-01-01 RX ADMIN — SODIUM CHLORIDE 100 ML/HR: 900 INJECTION, SOLUTION INTRAVENOUS at 08:17

## 2017-01-01 RX ADMIN — SODIUM CHLORIDE 1000 MG: 900 INJECTION, SOLUTION INTRAVENOUS at 04:07

## 2017-01-01 RX ADMIN — INSULIN LISPRO 10 UNITS: 100 INJECTION, SOLUTION INTRAVENOUS; SUBCUTANEOUS at 08:44

## 2017-01-01 RX ADMIN — LIDOCAINE HYDROCHLORIDE 5 ML: 10 INJECTION, SOLUTION EPIDURAL; INFILTRATION; INTRACAUDAL; PERINEURAL at 15:00

## 2017-01-01 RX ADMIN — INSULIN GLARGINE 40 UNITS: 100 INJECTION, SOLUTION SUBCUTANEOUS at 08:49

## 2017-01-01 RX ADMIN — SODIUM CHLORIDE 1000 MG: 900 INJECTION, SOLUTION INTRAVENOUS at 04:04

## 2017-01-01 RX ADMIN — ENOXAPARIN SODIUM 40 MG: 40 INJECTION SUBCUTANEOUS at 22:16

## 2017-01-01 RX ADMIN — INSULIN LISPRO 6 UNITS: 100 INJECTION, SOLUTION INTRAVENOUS; SUBCUTANEOUS at 22:21

## 2017-01-01 RX ADMIN — ONDANSETRON 4 MG: 2 INJECTION INTRAMUSCULAR; INTRAVENOUS at 08:59

## 2017-01-01 RX ADMIN — MIDAZOLAM HYDROCHLORIDE 0.5 MG: 1 INJECTION, SOLUTION INTRAMUSCULAR; INTRAVENOUS at 15:24

## 2017-01-01 RX ADMIN — VANCOMYCIN HYDROCHLORIDE 1250 MG: 10 INJECTION, POWDER, LYOPHILIZED, FOR SOLUTION INTRAVENOUS at 02:57

## 2017-01-01 RX ADMIN — NITROGLYCERIN 400 MCG: 40 INJECTION INTRAVENOUS at 15:50

## 2017-01-01 RX ADMIN — MULTIPLE VITAMINS W/ MINERALS TAB 1 TABLET: TAB at 08:54

## 2017-01-01 RX ADMIN — LIDOCAINE HYDROCHLORIDE 40 MG: 20 INJECTION, SOLUTION EPIDURAL; INFILTRATION; INTRACAUDAL; PERINEURAL at 08:56

## 2017-01-01 RX ADMIN — VANCOMYCIN HYDROCHLORIDE 1250 MG: 10 INJECTION, POWDER, LYOPHILIZED, FOR SOLUTION INTRAVENOUS at 00:05

## 2017-01-01 RX ADMIN — VANCOMYCIN HYDROCHLORIDE 1250 MG: 10 INJECTION, POWDER, LYOPHILIZED, FOR SOLUTION INTRAVENOUS at 18:51

## 2017-01-01 RX ADMIN — OXYCODONE AND ACETAMINOPHEN 1 TABLET: 5; 325 TABLET ORAL at 23:49

## 2017-01-01 RX ADMIN — ENOXAPARIN SODIUM 40 MG: 40 INJECTION SUBCUTANEOUS at 21:01

## 2017-01-01 RX ADMIN — ATORVASTATIN CALCIUM 40 MG: 20 TABLET, FILM COATED ORAL at 09:41

## 2017-01-01 RX ADMIN — FENTANYL CITRATE 50 MCG: 50 INJECTION, SOLUTION INTRAMUSCULAR; INTRAVENOUS at 15:21

## 2017-01-01 RX ADMIN — INSULIN LISPRO 8 UNITS: 100 INJECTION, SOLUTION INTRAVENOUS; SUBCUTANEOUS at 16:34

## 2017-01-01 RX ADMIN — INSULIN LISPRO 8 UNITS: 100 INJECTION, SOLUTION INTRAVENOUS; SUBCUTANEOUS at 17:20

## 2017-01-01 RX ADMIN — SODIUM CHLORIDE 100 ML/HR: 900 INJECTION, SOLUTION INTRAVENOUS at 21:42

## 2017-01-01 RX ADMIN — INSULIN LISPRO 10 UNITS: 100 INJECTION, SOLUTION INTRAVENOUS; SUBCUTANEOUS at 17:08

## 2017-01-01 RX ADMIN — SODIUM CHLORIDE 1000 MG: 900 INJECTION, SOLUTION INTRAVENOUS at 21:33

## 2017-01-01 RX ADMIN — VERAPAMIL HYDROCHLORIDE 10 MG: 2.5 INJECTION INTRAVENOUS at 15:18

## 2017-01-01 RX ADMIN — ENOXAPARIN SODIUM 40 MG: 40 INJECTION SUBCUTANEOUS at 22:21

## 2017-01-01 RX ADMIN — SODIUM CHLORIDE 75 ML/HR: 900 INJECTION, SOLUTION INTRAVENOUS at 09:26

## 2017-01-01 RX ADMIN — INSULIN LISPRO 6 UNITS: 100 INJECTION, SOLUTION INTRAVENOUS; SUBCUTANEOUS at 12:35

## 2017-01-01 RX ADMIN — VANCOMYCIN HYDROCHLORIDE 1250 MG: 10 INJECTION, POWDER, LYOPHILIZED, FOR SOLUTION INTRAVENOUS at 22:38

## 2017-01-01 RX ADMIN — ENOXAPARIN SODIUM 40 MG: 40 INJECTION SUBCUTANEOUS at 23:44

## 2017-01-01 RX ADMIN — SODIUM CHLORIDE 100 ML/HR: 900 INJECTION, SOLUTION INTRAVENOUS at 22:07

## 2017-01-01 RX ADMIN — INSULIN LISPRO 4 UNITS: 100 INJECTION, SOLUTION INTRAVENOUS; SUBCUTANEOUS at 22:05

## 2017-01-01 RX ADMIN — VANCOMYCIN HYDROCHLORIDE 1250 MG: 10 INJECTION, POWDER, LYOPHILIZED, FOR SOLUTION INTRAVENOUS at 02:36

## 2017-01-01 RX ADMIN — ENOXAPARIN SODIUM 40 MG: 40 INJECTION SUBCUTANEOUS at 00:41

## 2017-01-01 RX ADMIN — INSULIN GLARGINE 15 UNITS: 100 INJECTION, SOLUTION SUBCUTANEOUS at 11:00

## 2017-01-01 RX ADMIN — GLIPIZIDE 10 MG: 5 TABLET ORAL at 08:37

## 2017-01-01 RX ADMIN — VANCOMYCIN HYDROCHLORIDE 1250 MG: 10 INJECTION, POWDER, LYOPHILIZED, FOR SOLUTION INTRAVENOUS at 17:09

## 2017-01-01 RX ADMIN — VANCOMYCIN HYDROCHLORIDE 1250 MG: 10 INJECTION, POWDER, LYOPHILIZED, FOR SOLUTION INTRAVENOUS at 02:26

## 2017-01-01 RX ADMIN — PROPOFOL 150 MG: 10 INJECTION, EMULSION INTRAVENOUS at 17:33

## 2017-01-01 RX ADMIN — INSULIN LISPRO 10 UNITS: 100 INJECTION, SOLUTION INTRAVENOUS; SUBCUTANEOUS at 12:48

## 2017-01-01 RX ADMIN — ACETAMINOPHEN 650 MG: 325 TABLET ORAL at 17:17

## 2017-01-01 RX ADMIN — INSULIN LISPRO 6 UNITS: 100 INJECTION, SOLUTION INTRAVENOUS; SUBCUTANEOUS at 22:07

## 2017-01-01 RX ADMIN — INSULIN LISPRO 4 UNITS: 100 INJECTION, SOLUTION INTRAVENOUS; SUBCUTANEOUS at 12:06

## 2017-01-01 RX ADMIN — METFORMIN HYDROCHLORIDE 1000 MG: 500 TABLET, FILM COATED ORAL at 09:41

## 2017-01-01 RX ADMIN — INSULIN LISPRO 4 UNITS: 100 INJECTION, SOLUTION INTRAVENOUS; SUBCUTANEOUS at 18:55

## 2017-01-01 RX ADMIN — Medication 5 MG: at 09:26

## 2017-01-01 RX ADMIN — INSULIN LISPRO 3 UNITS: 100 INJECTION, SOLUTION INTRAVENOUS; SUBCUTANEOUS at 12:43

## 2017-01-01 RX ADMIN — MULTIPLE VITAMINS W/ MINERALS TAB 1 TABLET: TAB at 08:50

## 2017-01-01 RX ADMIN — VANCOMYCIN HYDROCHLORIDE 1250 MG: 10 INJECTION, POWDER, LYOPHILIZED, FOR SOLUTION INTRAVENOUS at 16:19

## 2017-01-01 RX ADMIN — IOPAMIDOL 100 ML: 612 INJECTION, SOLUTION INTRAVENOUS at 18:27

## 2017-01-01 RX ADMIN — CLOPIDOGREL BISULFATE 75 MG: 75 TABLET, FILM COATED ORAL at 08:55

## 2017-01-01 RX ADMIN — INSULIN LISPRO 3 UNITS: 100 INJECTION, SOLUTION INTRAVENOUS; SUBCUTANEOUS at 22:36

## 2017-01-01 RX ADMIN — VANCOMYCIN HYDROCHLORIDE 1250 MG: 10 INJECTION, POWDER, LYOPHILIZED, FOR SOLUTION INTRAVENOUS at 10:44

## 2017-01-01 RX ADMIN — VANCOMYCIN HYDROCHLORIDE 1250 MG: 10 INJECTION, POWDER, LYOPHILIZED, FOR SOLUTION INTRAVENOUS at 16:47

## 2017-01-01 RX ADMIN — INSULIN LISPRO 4 UNITS: 100 INJECTION, SOLUTION INTRAVENOUS; SUBCUTANEOUS at 16:50

## 2017-01-01 RX ADMIN — GLIPIZIDE 10 MG: 5 TABLET ORAL at 16:52

## 2017-01-01 RX ADMIN — GLIPIZIDE 10 MG: 5 TABLET ORAL at 17:12

## 2017-01-01 RX ADMIN — LEVOFLOXACIN 750 MG: 5 INJECTION, SOLUTION INTRAVENOUS at 19:30

## 2017-01-01 RX ADMIN — GLIPIZIDE 10 MG: 5 TABLET ORAL at 09:41

## 2017-01-01 RX ADMIN — ACETAMINOPHEN 650 MG: 325 SOLUTION ORAL at 16:35

## 2017-01-01 RX ADMIN — INSULIN LISPRO 6 UNITS: 100 INJECTION, SOLUTION INTRAVENOUS; SUBCUTANEOUS at 16:34

## 2017-01-01 RX ADMIN — POTASSIUM CHLORIDE 20 MEQ: 20 TABLET, EXTENDED RELEASE ORAL at 13:01

## 2017-01-01 RX ADMIN — INSULIN GLARGINE 40 UNITS: 100 INJECTION, SOLUTION SUBCUTANEOUS at 09:41

## 2017-01-01 RX ADMIN — ONDANSETRON 4 MG: 2 INJECTION INTRAMUSCULAR; INTRAVENOUS at 17:38

## 2017-01-01 RX ADMIN — VANCOMYCIN HYDROCHLORIDE 1250 MG: 10 INJECTION, POWDER, LYOPHILIZED, FOR SOLUTION INTRAVENOUS at 18:16

## 2017-01-01 RX ADMIN — INSULIN LISPRO 4 UNITS: 100 INJECTION, SOLUTION INTRAVENOUS; SUBCUTANEOUS at 12:48

## 2017-01-01 RX ADMIN — INSULIN GLARGINE 9 UNITS: 100 INJECTION, SOLUTION SUBCUTANEOUS at 10:00

## 2017-01-01 RX ADMIN — PROPOFOL 50 MG: 10 INJECTION, EMULSION INTRAVENOUS at 08:56

## 2017-01-01 RX ADMIN — FENTANYL CITRATE 25 MCG: 50 INJECTION, SOLUTION INTRAMUSCULAR; INTRAVENOUS at 14:52

## 2017-01-01 RX ADMIN — INSULIN LISPRO 2 UNITS: 100 INJECTION, SOLUTION INTRAVENOUS; SUBCUTANEOUS at 21:25

## 2017-01-01 RX ADMIN — MIDAZOLAM HYDROCHLORIDE 0.5 MG: 1 INJECTION, SOLUTION INTRAMUSCULAR; INTRAVENOUS at 14:52

## 2017-01-01 RX ADMIN — INSULIN LISPRO 3 UNITS: 100 INJECTION, SOLUTION INTRAVENOUS; SUBCUTANEOUS at 07:30

## 2017-01-01 RX ADMIN — SODIUM CHLORIDE 2586 ML: 900 INJECTION, SOLUTION INTRAVENOUS at 17:14

## 2017-01-01 RX ADMIN — INSULIN GLARGINE 40 UNITS: 100 INJECTION, SOLUTION SUBCUTANEOUS at 13:38

## 2017-01-01 RX ADMIN — Medication 5 MG: at 09:48

## 2017-01-01 RX ADMIN — NITROGLYCERIN 10000 MCG: 40 INJECTION INTRAVENOUS at 15:20

## 2017-01-01 RX ADMIN — VANCOMYCIN HYDROCHLORIDE 1250 MG: 10 INJECTION, POWDER, LYOPHILIZED, FOR SOLUTION INTRAVENOUS at 23:46

## 2017-01-01 RX ADMIN — LIDOCAINE HYDROCHLORIDE 80 MG: 20 INJECTION, SOLUTION EPIDURAL; INFILTRATION; INTRACAUDAL; PERINEURAL at 17:33

## 2017-01-01 RX ADMIN — MIDAZOLAM HYDROCHLORIDE 0.5 MG: 1 INJECTION, SOLUTION INTRAMUSCULAR; INTRAVENOUS at 15:01

## 2017-01-01 RX ADMIN — ENOXAPARIN SODIUM 40 MG: 40 INJECTION SUBCUTANEOUS at 21:55

## 2017-01-01 RX ADMIN — CLOPIDOGREL BISULFATE 75 MG: 75 TABLET, FILM COATED ORAL at 08:50

## 2017-01-01 RX ADMIN — MULTIPLE VITAMINS W/ MINERALS TAB 1 TABLET: TAB at 08:24

## 2017-01-01 RX ADMIN — INSULIN LISPRO 6 UNITS: 100 INJECTION, SOLUTION INTRAVENOUS; SUBCUTANEOUS at 08:39

## 2017-01-01 RX ADMIN — INSULIN LISPRO 5 UNITS: 100 INJECTION, SOLUTION INTRAVENOUS; SUBCUTANEOUS at 08:39

## 2017-01-01 RX ADMIN — INSULIN GLARGINE 40 UNITS: 100 INJECTION, SOLUTION SUBCUTANEOUS at 08:25

## 2017-01-01 RX ADMIN — HEPARIN SODIUM 2000 UNITS: 200 INJECTION, SOLUTION INTRAVENOUS at 14:44

## 2017-01-01 RX ADMIN — SODIUM CHLORIDE 75 ML/HR: 900 INJECTION, SOLUTION INTRAVENOUS at 02:42

## 2017-01-01 RX ADMIN — INSULIN LISPRO 6 UNITS: 100 INJECTION, SOLUTION INTRAVENOUS; SUBCUTANEOUS at 22:00

## 2017-01-01 RX ADMIN — SODIUM CHLORIDE 100 ML/HR: 900 INJECTION, SOLUTION INTRAVENOUS at 07:18

## 2017-01-01 RX ADMIN — ENOXAPARIN SODIUM 40 MG: 40 INJECTION SUBCUTANEOUS at 22:37

## 2017-01-01 RX ADMIN — INSULIN LISPRO 10 UNITS: 100 INJECTION, SOLUTION INTRAVENOUS; SUBCUTANEOUS at 12:47

## 2017-01-01 RX ADMIN — LEVOFLOXACIN 750 MG: 5 INJECTION, SOLUTION INTRAVENOUS at 17:16

## 2017-01-01 RX ADMIN — INSULIN LISPRO 10 UNITS: 100 INJECTION, SOLUTION INTRAVENOUS; SUBCUTANEOUS at 08:48

## 2017-01-01 RX ADMIN — SODIUM CHLORIDE 100 ML/HR: 900 INJECTION, SOLUTION INTRAVENOUS at 04:41

## 2017-01-01 RX ADMIN — VANCOMYCIN HYDROCHLORIDE 1250 MG: 10 INJECTION, POWDER, LYOPHILIZED, FOR SOLUTION INTRAVENOUS at 21:17

## 2017-01-01 RX ADMIN — INSULIN LISPRO 10 UNITS: 100 INJECTION, SOLUTION INTRAVENOUS; SUBCUTANEOUS at 12:39

## 2017-01-01 RX ADMIN — VANCOMYCIN HYDROCHLORIDE 1250 MG: 10 INJECTION, POWDER, LYOPHILIZED, FOR SOLUTION INTRAVENOUS at 18:48

## 2017-01-01 RX ADMIN — INSULIN LISPRO 4 UNITS: 100 INJECTION, SOLUTION INTRAVENOUS; SUBCUTANEOUS at 21:35

## 2017-01-01 RX ADMIN — INSULIN GLARGINE 40 UNITS: 100 INJECTION, SOLUTION SUBCUTANEOUS at 08:54

## 2017-01-01 RX ADMIN — ATORVASTATIN CALCIUM 40 MG: 20 TABLET, FILM COATED ORAL at 09:50

## 2017-01-01 RX ADMIN — SODIUM CHLORIDE, SODIUM LACTATE, POTASSIUM CHLORIDE, AND CALCIUM CHLORIDE: 600; 310; 30; 20 INJECTION, SOLUTION INTRAVENOUS at 17:49

## 2017-01-01 RX ADMIN — NITROGLYCERIN 400 MCG: 40 INJECTION INTRAVENOUS at 15:51

## 2017-01-01 RX ADMIN — SODIUM CHLORIDE 75 ML/HR: 900 INJECTION, SOLUTION INTRAVENOUS at 21:25

## 2017-01-01 RX ADMIN — OXYCODONE AND ACETAMINOPHEN 1 TABLET: 5; 325 TABLET ORAL at 21:34

## 2017-01-01 RX ADMIN — ATORVASTATIN CALCIUM 40 MG: 20 TABLET, FILM COATED ORAL at 08:54

## 2017-01-01 RX ADMIN — INSULIN LISPRO 10 UNITS: 100 INJECTION, SOLUTION INTRAVENOUS; SUBCUTANEOUS at 16:50

## 2017-01-01 RX ADMIN — INSULIN LISPRO 8 UNITS: 100 INJECTION, SOLUTION INTRAVENOUS; SUBCUTANEOUS at 07:30

## 2017-01-01 RX ADMIN — Medication 1 MG: at 18:52

## 2017-01-01 RX ADMIN — LEVOFLOXACIN 750 MG: 5 INJECTION, SOLUTION INTRAVENOUS at 21:40

## 2017-01-01 RX ADMIN — LEVOFLOXACIN 750 MG: 5 INJECTION, SOLUTION INTRAVENOUS at 22:15

## 2017-01-01 RX ADMIN — INSULIN LISPRO 5 UNITS: 100 INJECTION, SOLUTION INTRAVENOUS; SUBCUTANEOUS at 17:13

## 2017-01-01 RX ADMIN — METFORMIN HYDROCHLORIDE 1000 MG: 500 TABLET, FILM COATED ORAL at 17:12

## 2017-01-01 RX ADMIN — Medication 40 MCG/MIN: at 17:45

## 2017-01-01 RX ADMIN — VANCOMYCIN HYDROCHLORIDE 1250 MG: 10 INJECTION, POWDER, LYOPHILIZED, FOR SOLUTION INTRAVENOUS at 00:12

## 2017-01-01 RX ADMIN — VANCOMYCIN HYDROCHLORIDE 1250 MG: 10 INJECTION, POWDER, LYOPHILIZED, FOR SOLUTION INTRAVENOUS at 09:44

## 2017-01-01 RX ADMIN — SODIUM CHLORIDE 75 ML/HR: 900 INJECTION, SOLUTION INTRAVENOUS at 12:28

## 2017-01-01 RX ADMIN — LEVOFLOXACIN 750 MG: 5 INJECTION, SOLUTION INTRAVENOUS at 00:40

## 2017-01-01 RX ADMIN — INSULIN GLARGINE 40 UNITS: 100 INJECTION, SOLUTION SUBCUTANEOUS at 08:27

## 2017-01-01 RX ADMIN — VANCOMYCIN HYDROCHLORIDE 1250 MG: 10 INJECTION, POWDER, LYOPHILIZED, FOR SOLUTION INTRAVENOUS at 09:33

## 2017-01-01 RX ADMIN — SODIUM CHLORIDE 100 ML/HR: 900 INJECTION, SOLUTION INTRAVENOUS at 02:59

## 2017-01-01 RX ADMIN — INSULIN LISPRO 6 UNITS: 100 INJECTION, SOLUTION INTRAVENOUS; SUBCUTANEOUS at 08:26

## 2017-01-01 RX ADMIN — CLOPIDOGREL BISULFATE 300 MG: 75 TABLET, FILM COATED ORAL at 20:22

## 2017-01-01 RX ADMIN — CLOPIDOGREL BISULFATE 75 MG: 75 TABLET, FILM COATED ORAL at 08:06

## 2017-01-01 RX ADMIN — ATORVASTATIN CALCIUM 40 MG: 20 TABLET, FILM COATED ORAL at 08:50

## 2017-01-01 RX ADMIN — MULTIPLE VITAMINS W/ MINERALS TAB 1 TABLET: TAB at 08:06

## 2017-01-01 RX ADMIN — INSULIN LISPRO 4 UNITS: 100 INJECTION, SOLUTION INTRAVENOUS; SUBCUTANEOUS at 16:30

## 2017-01-01 RX ADMIN — METFORMIN HYDROCHLORIDE 500 MG: 500 TABLET, FILM COATED ORAL at 19:11

## 2017-01-01 RX ADMIN — SODIUM CHLORIDE 75 ML/HR: 900 INJECTION, SOLUTION INTRAVENOUS at 04:06

## 2017-01-01 RX ADMIN — INSULIN LISPRO 3 UNITS: 100 INJECTION, SOLUTION INTRAVENOUS; SUBCUTANEOUS at 08:44

## 2017-01-01 RX ADMIN — INSULIN GLARGINE 20 UNITS: 100 INJECTION, SOLUTION SUBCUTANEOUS at 08:40

## 2017-01-01 RX ADMIN — CLOPIDOGREL BISULFATE 75 MG: 75 TABLET, FILM COATED ORAL at 09:51

## 2017-01-01 RX ADMIN — INSULIN LISPRO 10 UNITS: 100 INJECTION, SOLUTION INTRAVENOUS; SUBCUTANEOUS at 13:06

## 2017-01-01 RX ADMIN — SODIUM CHLORIDE 100 ML/HR: 900 INJECTION, SOLUTION INTRAVENOUS at 01:17

## 2017-01-01 RX ADMIN — MIDAZOLAM HYDROCHLORIDE 0.5 MG: 1 INJECTION, SOLUTION INTRAMUSCULAR; INTRAVENOUS at 15:42

## 2017-01-01 RX ADMIN — INSULIN LISPRO 10 UNITS: 100 INJECTION, SOLUTION INTRAVENOUS; SUBCUTANEOUS at 09:25

## 2017-01-01 RX ADMIN — SODIUM CHLORIDE 500 ML: 900 INJECTION, SOLUTION INTRAVENOUS at 01:45

## 2017-01-01 RX ADMIN — INSULIN LISPRO 10 UNITS: 100 INJECTION, SOLUTION INTRAVENOUS; SUBCUTANEOUS at 18:19

## 2017-01-01 RX ADMIN — INSULIN GLARGINE 15 UNITS: 100 INJECTION, SOLUTION SUBCUTANEOUS at 08:40

## 2017-01-01 RX ADMIN — GLIPIZIDE 10 MG: 5 TABLET ORAL at 08:06

## 2017-01-01 RX ADMIN — CLOPIDOGREL BISULFATE 75 MG: 75 TABLET, FILM COATED ORAL at 09:41

## 2017-01-01 RX ADMIN — INSULIN LISPRO 2 UNITS: 100 INJECTION, SOLUTION INTRAVENOUS; SUBCUTANEOUS at 10:00

## 2017-01-01 RX ADMIN — INSULIN LISPRO 6 UNITS: 100 INJECTION, SOLUTION INTRAVENOUS; SUBCUTANEOUS at 17:09

## 2017-01-01 RX ADMIN — INSULIN LISPRO 10 UNITS: 100 INJECTION, SOLUTION INTRAVENOUS; SUBCUTANEOUS at 08:24

## 2017-07-24 PROBLEM — E87.1 HYPONATREMIA: Status: ACTIVE | Noted: 2017-01-01

## 2017-07-24 PROBLEM — L03.90 CELLULITIS: Status: ACTIVE | Noted: 2017-01-01

## 2017-07-24 PROBLEM — E11.65 HYPERGLYCEMIA DUE TO TYPE 2 DIABETES MELLITUS (HCC): Status: ACTIVE | Noted: 2017-01-01

## 2017-07-24 NOTE — ED NOTES
Urine sample obtained from patient. Patient medicated per MAR orders. Call bell within reach of patient. Will continue to monitor and assess.

## 2017-07-24 NOTE — H&P
History and Physical    Patient: Louann Brooke               Sex: male          DOA: 7/24/2017       YOB: 1965      Age:  46 y.o. Assessment/Plan     Cellulitis L foot s/p nail puncture with lymphadenitis  -erythema and lymphangitic streaking, interdigit skin breakdown WBC 16  -XR x1 no evidence of abscess or drainage/bone involvement, low threshold for CT  -levaquin and vanc 7/24 charli  -wound Cx x1    Hyperglycemia- on arival  A1c x1, add SSI + Ac/Hs coverage  DM education consult if A1c>6.5    Code status: Full  PPX:  DVT: LMWH  GI: None indicated    HPI:     Chief Complaint   Patient presents with    Skin Problem       Louann Brooke is a 46 y.o. male  Pashto speaking, decent [address] speaking, with no PMHX of who presents from urgent care after his Left foot became increasingly red, swollen, and painful. He stepped on a nail 3 days ago. He has had minimal medical care until this time. denies loss of sensation in the toes, fever, and any other symptoms or complaints at this time. Reports last tetnus shot 2yrs ago. Tachycardic with leukocytosis 16.7 on arrival given IVF however lactic acid 1.2     History reviewed. No pertinent past medical history. None       Social History:  Social History     Social History    Marital status: SINGLE     Spouse name: N/A    Number of children: N/A    Years of education: N/A     Occupational History    Not on file. Social History Main Topics    Smoking status: Current Every Day Smoker     Packs/day: 1.00    Smokeless tobacco: Never Used    Alcohol use No    Drug use: Yes     Special: Marijuana    Sexual activity: Not on file     Other Topics Concern    Not on file     Social History Narrative    No narrative on file       Family History:  History reviewed. No pertinent family history. Surgical History:  History reviewed. No pertinent surgical history.     Review of Systems  Constitutional:  +fever weight loss  HEENT:  No headache or visual changes  Cardiovascular:  No chest pain or diaphoresis  Respiratory:  No coughing, wheezing, or shortness of breath. GI:  No nausea or vomitting. No diarrhea  :  No hematuria or dysuria  Skin:  No rashes or moles  Neuro:  No seizures or syncope  Hematological:  No bruising or bleeding  Endocrine:  No diabetes or thyroid disease    Physical Exam:      Visit Vitals    /72 (BP 1 Location: Left arm, BP Patient Position: At rest)    Pulse (!) 118    Temp 99.8 °F (37.7 °C)    Resp 20    Ht 5' 10\" (1.778 m)    Wt 86.2 kg (190 lb)    SpO2 100%    BMI 27.26 kg/m2       Physical Exam:  Gen:  No distress, alert  HEENT:  Normal cephalic atraumatic, extra-occular movements are intact. Neck:  Supple, No JVD  Lungs:  Clear bilaterally, no wheeze, no rales, normal effort  Heart:  Regular Rate and Rhythm, normal S1 and S2, no edema  Abdomen:  Soft, non tender, normal bowel sounds, no guarding. Extremities:  Well perfused, no cyanosis or edema. L foot is beefy red on dorsum, puncture wound on plantar aspect healed, interdigit skin break down with heat, swelling, lymphadenitis streaking up to knee  Neurological:  Awake and alert, CN's are intact, normal strength throughout extremities  Skin:  No rashes or moles  Psych:  Normal thought process, does not appear anxious    Laboratory Studies: All lab results for the last 24 hours reviewed.   Recent Results (from the past 12 hour(s))   LACTIC ACID    Collection Time: 07/24/17  4:50 PM   Result Value Ref Range    Lactic acid 1.2 0.4 - 2.0 MMOL/L   METABOLIC PANEL, COMPREHENSIVE    Collection Time: 07/24/17  4:50 PM   Result Value Ref Range    Sodium 127 (L) 136 - 145 mmol/L    Potassium 4.3 3.5 - 5.5 mmol/L    Chloride 91 (L) 100 - 108 mmol/L    CO2 27 21 - 32 mmol/L    Anion gap 9 3.0 - 18 mmol/L    Glucose 303 (H) 74 - 99 mg/dL    BUN 18 7.0 - 18 MG/DL    Creatinine 1.11 0.6 - 1.3 MG/DL    BUN/Creatinine ratio 16 12 - 20      GFR est AA >60 >60 ml/min/1.73m2    GFR est non-AA >60 >60 ml/min/1.73m2    Calcium 9.1 8.5 - 10.1 MG/DL    Bilirubin, total 0.7 0.2 - 1.0 MG/DL    ALT (SGPT) 16 16 - 61 U/L    AST (SGOT) 12 (L) 15 - 37 U/L    Alk. phosphatase 99 45 - 117 U/L    Protein, total 8.2 6.4 - 8.2 g/dL    Albumin 2.9 (L) 3.4 - 5.0 g/dL    Globulin 5.3 (H) 2.0 - 4.0 g/dL    A-G Ratio 0.5 (L) 0.8 - 1.7     CBC WITH AUTOMATED DIFF    Collection Time: 07/24/17  4:50 PM   Result Value Ref Range    WBC 16.7 (H) 4.6 - 13.2 K/uL    RBC 4.71 4.70 - 5.50 M/uL    HGB 14.1 13.0 - 16.0 g/dL    HCT 39.6 36.0 - 48.0 %    MCV 84.1 74.0 - 97.0 FL    MCH 29.9 24.0 - 34.0 PG    MCHC 35.6 31.0 - 37.0 g/dL    RDW 13.0 11.6 - 14.5 %    PLATELET 163 202 - 210 K/uL    MPV 9.9 9.2 - 11.8 FL    NEUTROPHILS 82 (H) 42 - 75 %    LYMPHOCYTES 9 (L) 20 - 51 %    MONOCYTES 8 2 - 9 %    EOSINOPHILS 1 0 - 5 %    BASOPHILS 0 0 - 3 %    ABS. NEUTROPHILS 13.7 (H) 1.8 - 8.0 K/UL    ABS. LYMPHOCYTES 1.5 0.8 - 3.5 K/UL    ABS. MONOCYTES 1.3 (H) 0 - 1.0 K/UL    ABS. EOSINOPHILS 0.2 0.0 - 0.4 K/UL    ABS.  BASOPHILS 0.0 0.0 - 0.1 K/UL    RBC COMMENTS NORMOCYTIC, NORMOCHROMIC      DF MANUAL     URINALYSIS W/ RFLX MICROSCOPIC    Collection Time: 07/24/17  5:05 PM   Result Value Ref Range    Color DARK YELLOW      Appearance CLEAR      Specific gravity 1.028 1.005 - 1.030      pH (UA) 6.0 5.0 - 8.0      Protein 100 (A) NEG mg/dL    Glucose >1000 (A) NEG mg/dL    Ketone 40 (A) NEG mg/dL    Bilirubin NEGATIVE  NEG      Blood TRACE (A) NEG      Urobilinogen 2.0 (H) 0.2 - 1.0 EU/dL    Nitrites NEGATIVE  NEG      Leukocyte Esterase NEGATIVE  NEG     URINE MICROSCOPIC ONLY    Collection Time: 07/24/17  5:05 PM   Result Value Ref Range    WBC 0 to 1 0 - 5 /hpf    RBC 0 to 2 0 - 5 /hpf    Epithelial cells NEGATIVE  0 - 5 /lpf    Bacteria NEGATIVE  NEG /hpf       Rad:  XR L foot 7/24

## 2017-07-24 NOTE — IP AVS SNAPSHOT
[address] [address] 
[phone] Patient: Nany Arroyo MRN: [id number] :1965 Current Discharge Medication List  
  
START taking these medications Dose & Instructions Dispensing Information Comments Morning Noon Evening Bedtime  
 amoxicillin-clavulanate 875-125 mg per tablet Commonly known as:  AUGMENTIN Your last dose was: Your next dose is:    
   
   
 Dose:  1 Tab Take 1 Tab by mouth two (2) times a day. Quantity:  14 Tab Refills:  0  
     
   
   
   
  
 clopidogrel 75 mg Tab Commonly known as:  PLAVIX Your last dose was: Your next dose is:    
   
   
 Dose:  75 mg Take 1 Tab by mouth daily. Quantity:  30 Tab Refills:  1  
     
   
   
   
  
 glipiZIDE 10 mg tablet Commonly known as:  Mali Reap Your last dose was: Your next dose is:    
   
   
 Dose:  10 mg Take 1 Tab by mouth Before breakfast and dinner. Quantity:  60 Tab Refills:  1  
     
   
   
   
  
 metFORMIN 1,000 mg tablet Commonly known as:  GLUCOPHAGE Your last dose was: Your next dose is:    
   
   
 Dose:  1000 mg Take 1 Tab by mouth two (2) times daily (with meals). Quantity:  60 Tab Refills:  1  
     
   
   
   
  
 simvastatin 40 mg tablet Commonly known as:  ZOCOR Your last dose was: Your next dose is:    
   
   
 Dose:  40 mg Take 1 Tab by mouth nightly. Quantity:  30 Tab Refills:  1 Where to Get Your Medications Information on where to get these meds will be given to you by the nurse or doctor. ! Ask your nurse or doctor about these medications  
  amoxicillin-clavulanate 875-125 mg per tablet  
 clopidogrel 75 mg Tab  
 glipiZIDE 10 mg tablet  
 metFORMIN 1,000 mg tablet  
 simvastatin 40 mg tablet

## 2017-07-24 NOTE — ED PROVIDER NOTES
[facility] [address]  EMERGENCY DEPARTMENT HISTORY AND PHYSICAL EXAM       Date: 7/24/2017   Patient Name: Yulissa Kidd   YOB: 1965  Medical Record Number: [id number]    History of Presenting Illness     Chief Complaint   Patient presents with    Skin Problem        History Provided By:  Patient     Additional History:   5:00 PM  Yulissa Kidd is a 46 y.o. male presenting to the ED sent from [facility] C/O puncture wound to left foot s/p pulling a nail out of his shoe 3 days ago. Associated sxs include erythema with lymphangitic streaking, pain, and swelling. Pt states he last had a tetanus shot 2 years ago. Pt states he does not know if he has DM. Pt denies loss of sensation in the toes, fever, and any other symptoms or complaints at this time. Primary Care Provider: None     Past History     Past Medical History:   History reviewed. No pertinent past medical history. Past Surgical History:   History reviewed. No pertinent surgical history. Family History:   History reviewed. No pertinent family history. Social History:   Social History   Substance Use Topics    Smoking status: Current Every Day Smoker     Packs/day: 1.00    Smokeless tobacco: Never Used    Alcohol use No        Allergies:   No Known Allergies     Review of Systems   Review of Systems   Constitutional: Negative for fever. Endocrine: Negative for polydipsia, polyphagia and polyuria. Musculoskeletal: Negative for arthralgias. Skin: Positive for color change (erythema - left foot) and wound (puncture, left foot). +swelling left foot   All other systems reviewed and are negative. Physical Exam  Vitals:    07/24/17 1554   BP: 139/72   Pulse: (!) 118   Resp: 20   Temp: 99.8 °F (37.7 °C)   SpO2: 100%   Weight: 86.2 kg (190 lb)   Height: 5' 10\" (1.778 m)       Physical Exam   Constitutional: He is oriented to person, place, and time. He appears well-developed and well-nourished.  No distress. HENT:   Head: Normocephalic and atraumatic. Eyes: Conjunctivae and EOM are normal. Pupils are equal, round, and reactive to light. Neck: Normal range of motion. Cardiovascular: Regular rhythm. Tachycardia present. Pulmonary/Chest: Effort normal and breath sounds normal.   Musculoskeletal: Normal range of motion. He exhibits edema and tenderness. Left ankle: He exhibits normal pulse. No tenderness. Feet:    No calf TTP; distal pulses normal   Neurological: He is alert and oriented to person, place, and time. Skin: Skin is warm and dry. There is erythema. Psychiatric: He has a normal mood and affect. His behavior is normal.   Nursing note and vitals reviewed. Diagnostic Study Results     Labs -      Recent Results (from the past 12 hour(s))   LACTIC ACID    Collection Time: 07/24/17  4:50 PM   Result Value Ref Range    Lactic acid 1.2 0.4 - 2.0 MMOL/L   METABOLIC PANEL, COMPREHENSIVE    Collection Time: 07/24/17  4:50 PM   Result Value Ref Range    Sodium 127 (L) 136 - 145 mmol/L    Potassium 4.3 3.5 - 5.5 mmol/L    Chloride 91 (L) 100 - 108 mmol/L    CO2 27 21 - 32 mmol/L    Anion gap 9 3.0 - 18 mmol/L    Glucose 303 (H) 74 - 99 mg/dL    BUN 18 7.0 - 18 MG/DL    Creatinine 1.11 0.6 - 1.3 MG/DL    BUN/Creatinine ratio 16 12 - 20      GFR est AA >60 >60 ml/min/1.73m2    GFR est non-AA >60 >60 ml/min/1.73m2    Calcium 9.1 8.5 - 10.1 MG/DL    Bilirubin, total 0.7 0.2 - 1.0 MG/DL    ALT (SGPT) 16 16 - 61 U/L    AST (SGOT) 12 (L) 15 - 37 U/L    Alk.  phosphatase 99 45 - 117 U/L    Protein, total 8.2 6.4 - 8.2 g/dL    Albumin 2.9 (L) 3.4 - 5.0 g/dL    Globulin 5.3 (H) 2.0 - 4.0 g/dL    A-G Ratio 0.5 (L) 0.8 - 1.7     CBC WITH AUTOMATED DIFF    Collection Time: 07/24/17  4:50 PM   Result Value Ref Range    WBC 16.7 (H) 4.6 - 13.2 K/uL    RBC 4.71 4.70 - 5.50 M/uL    HGB 14.1 13.0 - 16.0 g/dL    HCT 39.6 36.0 - 48.0 %    MCV 84.1 74.0 - 97.0 FL    MCH 29.9 24.0 - 34.0 PG MCHC 35.6 31.0 - 37.0 g/dL    RDW 13.0 11.6 - 14.5 %    PLATELET 624 844 - 213 K/uL    MPV 9.9 9.2 - 11.8 FL    NEUTROPHILS 82 (H) 42 - 75 %    LYMPHOCYTES 9 (L) 20 - 51 %    MONOCYTES 8 2 - 9 %    EOSINOPHILS 1 0 - 5 %    BASOPHILS 0 0 - 3 %    ABS. NEUTROPHILS 13.7 (H) 1.8 - 8.0 K/UL    ABS. LYMPHOCYTES 1.5 0.8 - 3.5 K/UL    ABS. MONOCYTES 1.3 (H) 0 - 1.0 K/UL    ABS. EOSINOPHILS 0.2 0.0 - 0.4 K/UL    ABS. BASOPHILS 0.0 0.0 - 0.1 K/UL    RBC COMMENTS NORMOCYTIC, NORMOCHROMIC      DF MANUAL     URINALYSIS W/ RFLX MICROSCOPIC    Collection Time: 07/24/17  5:05 PM   Result Value Ref Range    Color DARK YELLOW      Appearance CLEAR      Specific gravity 1.028 1.005 - 1.030      pH (UA) 6.0 5.0 - 8.0      Protein 100 (A) NEG mg/dL    Glucose >1000 (A) NEG mg/dL    Ketone 40 (A) NEG mg/dL    Bilirubin NEGATIVE  NEG      Blood TRACE (A) NEG      Urobilinogen 2.0 (H) 0.2 - 1.0 EU/dL    Nitrites NEGATIVE  NEG      Leukocyte Esterase NEGATIVE  NEG     URINE MICROSCOPIC ONLY    Collection Time: 07/24/17  5:05 PM   Result Value Ref Range    WBC 0 to 1 0 - 5 /hpf    RBC 0 to 2 0 - 5 /hpf    Epithelial cells NEGATIVE  0 - 5 /lpf    Bacteria NEGATIVE  NEG /hpf       Radiologic Studies -  The following have been ordered and reviewed:  XR FOOT LT MIN 3 V    (Results Pending)     RADIOLOGY RESULT  6:20 PM   Left X-ray shows no fracture, no FB, no obvious osteomyelitis. Pending review by radiologist.   Written by PATRICK Merchant, as dictated by Christin Mcdonald PA-C. Medical Decision Making   I am the first provider for this patient. I reviewed the vital signs, available nursing notes, past medical history, past surgical history, family history and social history. Vital Signs-Reviewed the patient's vital signs.    Patient Vitals for the past 12 hrs:   Temp Pulse Resp BP SpO2   07/24/17 1554 99.8 °F (37.7 °C) (!) 118 20 139/72 100 %       Pulse Oximetry Analysis - Normal 100% on room air     Procedures: Procedures    ED Course:  5:00 PM  Initial assessment performed. The patients presenting problems have been discussed, and they are in agreement with the care plan formulated and outlined with them. I have encouraged them to ask questions as they arise throughout their visit.    5:24 PM Discussed patient's history, exam, and available diagnostics results with Alia Oakes MD, ED attending, who agrees w/ workup and abx plan.     6:21 PM Discussed patient's history, exam, and available diagnostics results with Marty Harper. Gonzalo Turk DO, internal medicine, who agrees with accepting admission of pt to medical.    6:21 PM  Patient is being admitted to the hospital by Marty Harper. Gonzalo Turk DO. The results of their tests and reasons for their admission have been discussed with them and/or available family. They convey agreement and understanding for the need to be admitted and for their admission diagnosis. Medications Given in the ED:  Medications   sodium chloride (NS) flush 5-10 mL (not administered)   levoFLOXacin (LEVAQUIN) 750 mg in D5W IVPB (750 mg IntraVENous New Bag 7/24/17 1716)   vancomycin (VANCOCIN) 1,250 mg in 0.9% sodium chloride 250 mL IVPB (not administered)   sodium chloride 0.9 % bolus infusion 2,586 mL (2,586 mL IntraVENous New Bag 7/24/17 1714)   acetaminophen (TYLENOL) tablet 650 mg (650 mg Oral Given 7/24/17 1717)       Diagnosis   Clinical Impression:   1. Cellulitis of foot    2. Puncture wound of foot, left, initial encounter    3. Hyperglycemia         _______________________________   Attestations: This note is prepared by Osmin Woody, acting as a Scribe for Everton Edwards PA-C on 4:45 PM on 7/24/2017 . Everton Edwards PA-C: The scribe's documentation has been prepared under my direction and personally reviewed by me in its entirety.   _______________________________

## 2017-07-24 NOTE — ED NOTES
Assumed care of patient. Patient presents complaining of pain, redness, and swelling to left foot. Patient reports he pulled a nail from his left boot on Friday after working. Patient denies known fevers but states \"My whole body feels hot. \" Patient denies any other symptoms. Upon assessment, patient's left foot red, swollen, and warm to touch. Streaking appreciated to patient's knee. Puncture wound noted to bottom of patient's foot in between the third and fourth toe. Patient's third and fourth toe appear black in color. Patient denies any numbness or tingling. Patient states he can \"feel his toes a little\" in reference to third and fourth toes on left foot. Left-sided pedal pulse palpable and patient able to move LLE without any difficulty. PIV access established with 20g in right AC. Patient states he believes his last tetanus was in 2015.

## 2017-07-24 NOTE — ED NOTES
Wound culture obtained from patient's left foot. Patient medicated per MAR orders. Vital signs updated. Patient has no complaints at this time. Call bell within reach of patient. Will continue to monitor and assess.

## 2017-07-24 NOTE — PROGRESS NOTES
Pharmacy Dosing Services: Vancomycin  Consult for Vancomycin Dosing by Pharmacy by Gia Mcmahon PA-C  Consult provided for this 46y.o. year old male , for indication of Skin and soft tissue infection. Day of Therapy 1    Ht Readings from Last 1 Encounters:   07/24/17 177.8 cm (70\")        Wt Readings from Last 1 Encounters:   07/24/17 86.2 kg (190 lb)        Previous Regimen Vancomycin 1000 mg x 1 dose   Last Level N/A   Other Current Antibiotics Levofloxacin   Significant Cultures Blood - pending   Serum Creatinine Lab Results   Component Value Date/Time    Creatinine 1.11 07/24/2017 04:50 PM      Creatinine Clearance Estimated Creatinine Clearance: 79.5 mL/min (based on Cr of 1.11). BUN Lab Results   Component Value Date/Time    BUN 18 07/24/2017 04:50 PM      WBC Lab Results   Component Value Date/Time    WBC 16.7 07/24/2017 04:50 PM      H/H Lab Results   Component Value Date/Time    HGB 14.1 07/24/2017 04:50 PM      Platelets Lab Results   Component Value Date/Time    PLATELET 887 44/70/5534 04:50 PM      Temp 99.8 °F (37.7 °C)     Start Vancomycin therapy, with loading dose of 1250 mg. - to be given approx. 2000 - 7/24/17. Follow with maintenance dose of  1250 mg every 12 hrs, next dose 0800 - 7/25/17. Dose calculated to approximate a therapeutic trough of 10-15 mcg/mL. Pharmacy to follow daily and will make changes to dose and/or frequency based on clinical status.       Pharmacist Kristian Mario, [address] [facility] [place]

## 2017-07-24 NOTE — ED TRIAGE NOTES
Patient states that he pulled a nail out of his shoe on Friday. Patient with pain, redness and swelling to the left foot. Sepsis Screening completed    (  )Patient meets SIRS criteria. ( x )Patient does not meet SIRS criteria.       SIRS Criteria is achieved when two or more of the following are present   Temperature < 96.8°F (36°C) or > 100.9°F (38.3°C)   Heart Rate > 90 beats per minute   Respiratory Rate > 20 breaths per minute   WBC count > 12,000 or <4,000 or > 10% bands

## 2017-07-24 NOTE — IP AVS SNAPSHOT
Scooby Big 
 
 
 [address] [place] [id number] 
[phone] Patient: Ricky Theodore MRN: [id number] :1965 You are allergic to the following No active allergies Recent Documentation Height Weight BMI Smoking Status 1.778 m 85.6 kg 26.32 kg/m2 Current Every Day Smoker Unresulted Labs Order Current Status AFB CULTURE + SMEAR W/RFLX ID FROM CULTURE Preliminary result Emergency Contacts Name Discharge Info Relation Home Work Mobile [address] [facility] CAREGIVER [3] Sister [23]   [phone] Jen Olvera  Other Relative [6]   [phone] About your hospitalization You were admitted on:  2017 You last received care in the:  [address] [place] [address] You were discharged on:  August 10, 2017 Unit phone number:  [phone] Why you were hospitalized Your primary diagnosis was:  Cellulitis Your diagnoses also included:  Hyperglycemia Due To Type 2 Diabetes Mellitus (Hcc), Hyponatremia, Blind Left Eye, Osteomyelitis Of Left Foot (Hcc), Pad (Peripheral Artery Disease) (Hcc), Gangrene Of Toe (Hcc) Providers Seen During Your Hospitalizations Provider Role Specialty Primary office phone Nish Mitchell MD Attending Provider Emergency Medicine [phone] Priyanka Mitchell DO Attending Provider Internal Medicine [phone] Andrei Hansen DO Attending Provider Family Practice [phone] Your Primary Care Physician (PCP) Primary Care Physician Office Phone Office Fax NONE ** None ** ** None ** Follow-up Information Follow up With Details Comments Contact Info Manas Childers DPM On 2017 Follow up appointment scheduled for 2017 at 3:30 p.m. Please arrive at 3:00 p.m. for check in. [address] [address] 
[phone] [facility] On 8/29/2017 Follow up appointment scheduled for August 29, 2017 at 10:00 a.m. Please bring picture ID, list of meds, proof of income, $75.00 to appointment. Please arrive at 9:45 a.m. for check in. [place] [place] [address] 
[phone] None   None (395) Patient stated that they have no PCP Dr Montrell Farnsworth on 8/14/2017 time as given   
 [facility] Schedule an appointment as soon as possible for a visit in 1 week re:diabetes [address] [facility] [address] [facility], [address] Current Discharge Medication List  
  
START taking these medications Dose & Instructions Dispensing Information Comments Morning Noon Evening Bedtime  
 amoxicillin-clavulanate 875-125 mg per tablet Commonly known as:  AUGMENTIN Your last dose was: Your next dose is:    
   
   
 Dose:  1 Tab Take 1 Tab by mouth two (2) times a day. Quantity:  14 Tab Refills:  0  
     
   
   
   
  
 clopidogrel 75 mg Tab Commonly known as:  PLAVIX Your last dose was: Your next dose is:    
   
   
 Dose:  75 mg Take 1 Tab by mouth daily. Quantity:  30 Tab Refills:  1  
     
   
   
   
  
 glipiZIDE 10 mg tablet Commonly known as:  Delphia Carwin Your last dose was: Your next dose is:    
   
   
 Dose:  10 mg Take 1 Tab by mouth Before breakfast and dinner. Quantity:  60 Tab Refills:  1  
     
   
   
   
  
 metFORMIN 1,000 mg tablet Commonly known as:  GLUCOPHAGE Your last dose was: Your next dose is:    
   
   
 Dose:  1000 mg Take 1 Tab by mouth two (2) times daily (with meals). Quantity:  60 Tab Refills:  1  
     
   
   
   
  
 simvastatin 40 mg tablet Commonly known as:  ZOCOR Your last dose was: Your next dose is:    
   
   
 Dose:  40 mg Take 1 Tab by mouth nightly. Quantity:  30 Tab Refills:  1 Where to Get Your Medications Information on where to get these meds will be given to you by the nurse or doctor. ! Ask your nurse or doctor about these medications  
  amoxicillin-clavulanate 875-125 mg per tablet  
 clopidogrel 75 mg Tab  
 glipiZIDE 10 mg tablet  
 metFORMIN 1,000 mg tablet  
 simvastatin 40 mg tablet Discharge Instructions Diabetic diet NO weight on left foot!!! 
Use walker Lab Results Component Value Date/Time Hemoglobin A1c 11.2 07/24/2017 04:50 PM  
 
 
This lab test reflects that your blood sugar has been slightly elevated over the past 3 months and should be evaluated by your primary care provider. An A1C of 5.7-6.4% meets the criteria for pre-diabetes; an A1C of 6.5% or higher meets the criteria for diabetes. This lab test reflects that your blood sugar averaged 275 mg/dL  over the past 3 months. It is important to follow up with your provider on a routine basis to continue to evaluate your blood sugar and discuss any necessary changes in treatment. DISCHARGE SUMMARY from Nurse The following personal items are in your possession at time of discharge: 
 
Dental Appliances: None Visual Aid: None Jewelry: None Clothing: None Other Valuables: None PATIENT INSTRUCTIONS: 
 
 
F-face looks uneven A-arms unable to move or move unevenly S-speech slurred or non-existent T-time-call 911 as soon as signs and symptoms begin-DO NOT go Back to bed or wait to see if you get better-TIME IS BRAIN. Warning Signs of HEART ATTACK Call 911 if you have these symptoms: ? Chest discomfort. Most heart attacks involve discomfort in the center of the chest that lasts more than a few minutes, or that goes away and comes back. It can feel like uncomfortable pressure, squeezing, fullness, or pain. ? Discomfort in other areas of the upper body. Symptoms can include pain or discomfort in one or both arms, the back, neck, jaw, or stomach. ? Shortness of breath with or without chest discomfort. ? Other signs may include breaking out in a cold sweat, nausea, or lightheadedness. Don't wait more than five minutes to call [address]! Fast action can save your life. Calling 911 is almost always the fastest way to get lifesaving treatment. Emergency Medical Services staff can begin treatment when they arrive  up to an hour sooner than if someone gets to the hospital by car. The discharge information has been reviewed with the patient. The patient verbalized understanding. Discharge medications reviewed with the patient and niece and appropriate educational materials and side effects teaching were provided. Patient armband removed and shredded] Discharge Instructions Attachments/References DIABETES DIET MEAL PLANNING: GENERAL INFO (Bangladeshi) DIABETES: BLOOD SUGAR EMERGENCIES (Bangladeshi) DIABETES: ALCOHOL (Bangladeshi) DIABETES DIET GUIDELINES: GENERAL INFO (Bangladeshi) DIABETES: NUTRITION TIPS (Bangladeshi) Discharge Orders None Introducing [facility]! [facility] introduce portal paciente Nasty Galhart . Ahora se puede acceder a partes de morley expediente médico, enviar por correo electrónico la oficina de morley médico y solicitar renovaciones de medicamentos en línea. En morley navegador de Internet , Clementeen Pretzel a [email or web address] Kelsey clic en el usuario por Baxter Grapes? Patricia See clic aquí en la sesión Genoveva Levin. Verá la página de registro [place]. Ingrese morley código de [facility] nilesh y venkat aparece a continuación. Usted no tendrá que [employer] código después de jad completado el proceso de registro . Si usted no se inscribe antes de la fecha de caducidad , debe solicitar un nuevo código. · MyChart Código de acceso : [facility] Expires: 10/22/2017  4:50 PM 
 
Ingresa los últimos cuatro dígitos de morley Número de Seguro Social ( xxxx ) y fecha de nacimiento ( dd / mm / aaaa ) venkat se indica y kelsey clic en Enviar. Usted será llevado a la siguiente página de registro . Crear un ID MyChart . Esta será morley ID de inicio de sesión de MyChart y no puede ser [place] , por lo que pensar en nadiya que es [place] y fácil de recordar . Crear nadiya contraseña MyChart . Usted puede cambiar morley contraseña en cualquier momento . Ingrese morley Password Reset de preguntas y [place] . [place] se puede utilizar en un momento posterior si usted olvida morley contraseña. Introduzca morley dirección de correo electrónico . Ana Prime recibirá nadiya notificación por correo electrónico cuando la nueva información está disponible en MyChart . Thedore Hope clic en Registrarse. Valene Ishan bailee y descargar porciones de morley expediente médico. 
Kelsey clic en el enlace de descarga del menú Resumen para descargar nadiya copia portátil de morley información médica . Si tiene [employer] , por favor visite la sección de preguntas frecuentes del sitio web MyChart . Recuerde, MyChart NO es que se utilizará para las necesidades urgentes. Para emergencias médicas , llame al 911 . Ahora disponible en morley iPhone y Android ! General Information Please provide this summary of care documentation to your next provider. Patient Signature:  ____________________________________________________________ Date:  ____________________________________________________________  
  
Vann Livings Provider Signature:  ____________________________________________________________ Date:  ____________________________________________________________ More Information Learning About Meal Planning for Diabetes Why plan your meals? Meal planning can be a key part of managing diabetes. Planning meals and snacks with the right balance of carbohydrate, protein, and fat can help you keep your blood sugar at the target level you set with your doctor. You don't have to eat special foods. You can eat what your family eats, including sweets once in a while. But you do have to pay attention to how often you eat and how much you eat of certain foods. You may want to work with a dietitian or a certified diabetes educator. He or she can give you tips and meal ideas and can answer your questions about meal planning. This health professional can also help you reach a healthy weight if that is one of your goals. What plan is right for you? Your dietitian or diabetes educator may suggest that you start with the plate format or carbohydrate counting. The plate format The plate format is a simple way to help you manage how you eat. You plan meals by learning how much space each food should take on a plate. Using the plate format helps you spread carbohydrate throughout the day. It can make it easier to keep your blood sugar level within your target range. It also helps you see if you're eating healthy portion sizes. To use the plate format, you put non-starchy vegetables on half your plate. Add meat or meat substitutes on one-quarter of the plate. Put a grain or starchy vegetable (such as brown rice or a potato) on the final quarter of the plate. You can add a small piece of fruit and some low-fat or fat-free milk or yogurt, depending on your carbohydrate goal for each meal. 
Here are some tips for using the plate format: · Make sure that you are not using an oversized plate. A 9-inch plate is best. Many restaurants use larger plates. · Get used to using the plate format at home. Then you can use it when you eat out. · Write down your questions about using the plate format. Talk to your doctor, a dietitian, or a diabetes educator about your concerns. Carbohydrate counting With carbohydrate counting, you plan meals based on the amount of carbohydrate in each food. Carbohydrate raises blood sugar higher and more quickly than any other nutrient. It is found in desserts, breads and cereals, and fruit. It's also found in starchy vegetables such as potatoes and corn, grains such as rice and pasta, and milk and yogurt. Spreading carbohydrate throughout the day helps keep your blood sugar levels within your target range. Your daily amount depends on several things, including your weight, how active you are, which diabetes medicines you take, and what your goals are for your blood sugar levels. A registered dietitian or diabetes educator can help you plan how much carbohydrate to include in each meal and snack. A guideline for your daily amount of carbohydrate is: · 45 to 60 grams at each meal. That's about the same as 3 to 4 carbohydrate servings. · 15 to 20 grams at each snack. That's about the same as 1 carbohydrate serving. The Nutrition Facts label on packaged foods tells you how much carbohydrate is in a serving of the food. First, look at the serving size on the food label. Is that the amount you eat in a serving? All of the nutrition information on a food label is based on that serving size. So if you eat more or less than that, you'll need to adjust the other numbers. Total carbohydrate is the next thing you need to look for on the label. If you count carbohydrate servings, one serving of carbohydrate is 15 grams. For foods that don't come with labels, such as fresh fruits and vegetables, you'll need a guide that lists carbohydrate in these foods. Ask your doctor, dietitian, or diabetes educator about books or other nutrition guides you can use.  
If you take insulin, you need to know how many grams of carbohydrate are in a meal. This lets you know how much rapid-acting insulin to take before you eat. If you use an insulin pump, you get a constant rate of insulin during the day. So the pump must be programmed at meals to give you extra insulin to cover the rise in blood sugar after meals. When you know how much carbohydrate you will eat, you can take the right amount of insulin. Or, if you always use the same amount of insulin, you need to make sure that you eat the same amount of carbohydrate at meals. If you need more help to understand carbohydrate counting and food labels, ask your doctor, dietitian, or diabetes educator. How do you get started with meal planning? Here are some tips to get started: 
· Plan your meals a week at a time. Don't forget to include snacks too. · Use cookbooks or online recipes to plan several main meals. Plan some quick meals for busy nights. You also can double some recipes that freeze well. Then you can save half for other busy nights when you don't have time to cook. · Make sure you have the ingredients you need for your recipes. If you're running low on basic items, put these items on your shopping list too. · List foods that you use to make breakfasts, lunches, and snacks. List plenty of fruits and vegetables. · Post this list on the refrigerator. Add to it as you think of more things you need. · Take the list to the store to do your weekly shopping. Follow-up care is a key part of your treatment and safety. Be sure to make and go to all appointments, and call your doctor if you are having problems. It's also a good idea to know your test results and keep a list of the medicines you take. Where can you learn more? Go to http://angeles-karsten.info/. Rohit June in the search box to learn more about \"Learning About Meal Planning for Diabetes. \" Current as of: March 13, 2017 Content Version: 11.3 © 8011-3830 [employer].  Care instructions adapted under license by [address] (which disclaims liability or warranty for this information). If you have questions about a medical condition or this instruction, always ask your healthcare professional. [address] any warranty or liability for your use of this information. Diabetes Blood Sugar Emergencies: Your Action Plan How can you prevent a blood sugar emergency? An important part of living with diabetes is keeping your blood sugar in your target range. You'll need to know what to do if it's too high or too low. Managing your blood sugar levels helps you avoid emergencies. This care sheet will teach you about the signs of high and low blood sugar. It will help you make an action plan with your doctor for when these signs occur. Low blood sugar is more likely to happen if you take certain medicines for diabetes. It can also happen if you skip a meal, drink alcohol, or exercise more than usual. 
You may get high blood sugar if you eat differently than you normally do. One example is eating more carbohydrate than usual. Having a cold, the flu, or other sudden illness can also cause high blood sugar levels. Levels can also rise if you miss a dose of medicine. Any change in how you take your medicine may affect your blood sugar level. So it's important to work with your doctor before you make any changes. Check your blood sugar Work with your doctor to fill in the blank spaces below that apply to you. Track your levels, know your target range, and write down ways you can get your blood sugar back in your target range. A log book can help you track your levels. Take the book to all of your medical appointments. · Check your blood sugar _____ times a day, at these times:________________________________________________. (For example: Before meals, at bedtime, before exercise, during exercise, other.) · Your blood sugar target range before a meal is ___________________. Your blood sugar target range after a meal is _______________________. · Do this___________________________________________________to get your blood sugar back within your safe range if your blood sugar results are _________________________________________. (For example: Less than 70 or above 250 mg/dL.) Call your doctor when your blood sugar results are ___________________________________. (For example: Less than 70 or above 250 mg/dL.) What are the symptoms of low and high blood sugar? Common symptoms of low blood sugar are sweating and feeling shaky, weak, hungry, or confused. Symptoms can start quickly. Common symptoms of high blood sugar are feeling very thirsty or very hungry. You may also pass urine more often than usual. You may have blurry vision and may lose weight without trying. But some people may have high or low blood sugar without having any symptoms. That's a good reason to check your blood sugar on a regular schedule. What should you do if you have symptoms? Work with your doctor to fill in the blank spaces below that apply to you. Low blood sugar If you have symptoms of low blood sugar, check your blood sugar. If it's below _____ (for example, below 70), eat or drink a quick-sugar food that has about 15 grams of carbohydrate. Your goal is to get your level back to your safe range. Check your blood sugar again 15 minutes later. If it's still not in your target range, take another 15 grams of carbohydrate and check your blood sugar again in 15 minutes. Repeat this until you reach your target. Then go back to your regular testing schedule. When you have low blood sugar, it's best to stop or reduce any physical activity until your blood sugar is back in your target range and is stable. If you must stay active, eat or drink 30 grams of carbohydrate. Then check your blood sugar again in 15 minutes.  If it's not in your target range, take another 30 grams of carbohydrates. Check your blood sugar again in 15 minutes. Keep doing this until you reach your target. You can then go back to your regular testing schedule. If your symptoms or blood sugar levels are getting worse or have not improved after 15 minutes, seek medical care right away. Here are some examples of quick-sugar foods with 15 grams of carbohydrate: · 3 or 4 glucose tablets · 1 tube of glucose gel · Hard candy (such as 3 Jolly Ranchers or 5 to [employer]) · ½ cup to ¾ cup (4 to 6 ounces) of fruit juice or regular (not diet) soda High blood sugar If you have symptoms of high blood sugar, check your blood sugar. Your goal is to get your level back to your target range. If it's above ______ (for example, above 250), follow these steps: · If you missed a dose of your diabetes medicine, take it now. Take only the amount of medicine that you have been prescribed. Do not take more or less medicine. · Give yourself insulin if your doctor has prescribed it for high blood sugar. · Test for ketones, if the doctor told you to do so. If the results of the ketone test show a moderate-to-large amount of ketones, call the doctor for advice. · Wait 30 minutes after you take the extra insulin or the missed medicine. Check your blood sugar again. If your symptoms or blood sugar levels are getting worse or have not improved after taking these steps, seek medical care right away. Follow-up care is a key part of your treatment and safety. Be sure to make and go to all appointments, and call your doctor if you are having problems. It's also a good idea to know your test results and keep a list of the medicines you take. Where can you learn more? Go to http://angeles-karsten.info/. Enter C416 in the search box to learn more about \"Diabetes Blood Sugar Emergencies: Your Action Plan. \" Current as of: March 13, 2017 Content Version: 11.3 © 3048-1013 [employer], [employer]. Care instructions adapted under license by [employer] (which disclaims liability or warranty for this information). If you have questions about a medical condition or this instruction, always ask your healthcare professional. [address] any warranty or liability for your use of this information. Diabetes and Alcohol: Care Instructions Your Care Instructions People who have diabetes need to be more careful with alcohol. Before you drink, consider a few things: Is your diabetes well controlled? Do you know how drinking alcohol can affect you? Do you have high blood pressure, nerve damage, or eye problems from your diabetes? If you take insulin or another medicine for diabetes, drinking alcohol may cause low blood sugar. This could cause dangerous low blood sugar levels. Too much alcohol can also affect your ability to know your blood sugar is low and to treat it. Drinking alcohol can make you lightheaded at first and drowsy as you drink more, both of which may be similar to the symptoms of low blood sugar. Drinking a lot of alcohol over a long period of time can damage your liver (cirrhosis). If this happens, your body may lose its natural response to protect itself from low blood sugar. If you are controlling your diabetes and do not have other health issues, it may be okay to have a drink once in a while. Learning how alcohol affects your body can help you make the right choices. Follow-up care is a key part of your treatment and safety. Be sure to make and go to all appointments, and call your doctor if you are having problems. It's also a good idea to know your test results and keep a list of the medicines you take. How can you care for yourself at home? If you drink · Work with your doctor or other diabetes expert to find what is best for you.  Make sure you know whether it is safe to drink if you are taking insulin or another medicine for diabetes. · In general, limit alcohol to 1 drink a day with a meal if you are a woman. If you are a man, limit alcohol to 2 drinks a day with a meal. The following is considered a standard drink: 
¨ One 12-ounce bottle of beer or wine cooler ¨ One 5-ounce glass of wine ¨ One mixed drink with 1.5 ounces of 80-proof hard liquor, such as gin, whiskey, or rum · Choose alcoholic drinks wisely. With hard alcohol, use sugar-free mixers, such as diet tonic, water, or club soda. Pick drinks that have less alcohol, including light beer or dry wine. Or add club soda to wine to dilute it. Also remember that most alcoholic drinks have a lot of calories. · When you drink, check your blood sugar before you go to bed. Have a snack before bed so your blood sugar does not drop while you sleep. When not to drink · Never drink on an empty stomach. If you do drink alcohol, drink it only with a meal or snack. Having as little as 2 drinks on an empty stomach could lead to low blood sugar. · Do not drink alcohol if you have problems recognizing the signs of low blood sugar until they become severe. · Do not drink alcohol after you exercise. The exercise itself lowers blood sugar. · Do not drink if you have nerve damage. Drinking can make it worse and increase the pain, numbness, and other symptoms. · Do not drink if you have high blood pressure. · Do not drink if you have diabetic eye disease. · Do not drink if you have high triglycerides, a type of fat in your blood. Drinking can raise triglycerides. · Do not drink if you are trying to lose weight. Alcohol provides empty calories that do not give you any nutrients. · Do not drink and drive. The effects of alcohol are greater if you have low blood sugar. When should you call for help? Call 911 anytime you think you may need emergency care. For example, call if: 
· You passed out (lost consciousness). · You are confused or cannot think clearly. · Your blood sugar is very high or very low. Watch closely for changes in your health, and be sure to contact your doctor if: 
· Your blood sugar stays outside the level your doctor set for you. · You have any problems. Where can you learn more? Go to http://angeles-karsten.info/. Enter T236 in the search box to learn more about \"Diabetes and Alcohol: Care Instructions. \" Current as of: March 13, 2017 Content Version: 11.3 © 5877-0194 Dreamise. Care instructions adapted under license by Gathered[employer] (which disclaims liability or warranty for this information). If you have questions about a medical condition or this instruction, always ask your healthcare professional. [address] any warranty or liability for your use of this information. Learning About Diabetes Food Guidelines Your Care Instructions Meal planning is important to manage diabetes. It helps keep your blood sugar at a target level (which you set with your doctor). You don't have to eat special foods. You can eat what your family eats, including sweets once in a while. But you do have to pay attention to how often you eat and how much you eat of certain foods. You may want to work with a dietitian or a certified diabetes educator (CDE) to help you plan meals and snacks. A dietitian or CDE can also help you lose weight if that is one of your goals. What should you know about eating carbs? Managing the amount of carbohydrate (carbs) you eat is an important part of healthy meals when you have diabetes. Carbohydrate is found in many foods. · Learn which foods have carbs. And learn the amounts of carbs in different foods. ¨ Bread, cereal, pasta, and rice have about 15 grams of carbs in a serving. A serving is 1 slice of bread (1 ounce), ½ cup of cooked cereal, or 1/3 cup of cooked pasta or rice. ¨ Fruits have 15 grams of carbs in a serving. A serving is 1 small fresh fruit, such as an apple or orange; ½ of a banana; ½ cup of cooked or canned fruit; ½ cup of fruit juice; 1 cup of melon or raspberries; or 2 tablespoons of dried fruit. ¨ Milk and no-sugar-added yogurt have 15 grams of carbs in a serving. A serving is 1 cup of milk or 2/3 cup of no-sugar-added yogurt. ¨ Starchy vegetables have 15 grams of carbs in a serving. A serving is ½ cup of mashed potatoes or sweet potato; 1 cup winter squash; ½ of a small baked potato; ½ cup of cooked beans; or ½ cup cooked corn or green peas. · Learn how much carbs to eat each day and at each meal. A dietitian or CDE can teach you how to keep track of the amount of carbs you eat. This is called carbohydrate counting. · If you are not sure how to count carbohydrate grams, use the Plate Method to plan meals. It is a good, quick way to make sure that you have a balanced meal. It also helps you spread carbs throughout the day. ¨ Divide your plate by types of foods. Put non-starchy vegetables on half the plate, meat or other protein food on one-quarter of the plate, and a grain or starchy vegetable in the final quarter of the plate. To this you can add a small piece of fruit and 1 cup of milk or yogurt, depending on how many carbs you are supposed to eat at a meal. 
· Try to eat about the same amount of carbs at each meal. Do not \"save up\" your daily allowance of carbs to eat at one meal. 
· Proteins have very little or no carbs per serving. Examples of proteins are beef, chicken, turkey, fish, eggs, tofu, cheese, cottage cheese, and peanut butter. A serving size of meat is 3 ounces, which is about the size of a deck of cards. Examples of meat substitute serving sizes (equal to 1 ounce of meat) are 1/4 cup of cottage cheese, 1 egg, 1 tablespoon of peanut butter, and ½ cup of tofu. How can you eat out and still eat healthy? · Learn to estimate the serving sizes of foods that have carbohydrate. If you measure food at home, it will be easier to estimate the amount in a serving of restaurant food. · If the meal you order has too much carbohydrate (such as potatoes, corn, or baked beans), ask to have a low-carbohydrate food instead. Ask for a salad or green vegetables. · If you use insulin, check your blood sugar before and after eating out to help you plan how much to eat in the future. · If you eat more carbohydrate at a meal than you had planned, take a walk or do other exercise. This will help lower your blood sugar. What else should you know? · Limit saturated fat, such as the fat from meat and dairy products. This is a healthy choice because people who have diabetes are at higher risk of heart disease. So choose lean cuts of meat and nonfat or low-fat dairy products. Use olive or canola oil instead of butter or shortening when cooking. · Don't skip meals. Your blood sugar may drop too low if you skip meals and take insulin or certain medicines for diabetes. · Check with your doctor before you drink alcohol. Alcohol can cause your blood sugar to drop too low. Alcohol can also cause a bad reaction if you take certain diabetes medicines. Follow-up care is a key part of your treatment and safety. Be sure to make and go to all appointments, and call your doctor if you are having problems. It's also a good idea to know your test results and keep a list of the medicines you take. Where can you learn more? Go to http://angeles-karsten.info/. Enter M416 in the search box to learn more about \"Learning About Diabetes Food Guidelines. \" Current as of: March 13, 2017 Content Version: 11.3 © 8575-5162 Billdesk. Care instructions adapted under license by Sococo (which disclaims liability or warranty for this information).  If you have questions about a medical condition or this instruction, always ask your healthcare professional. [address] any warranty or liability for your use of this information. Nutrition Tips for Diabetes: After Your Visit Your Care Instructions A healthy diet is important to manage diabetes. It helps you lose weight (if you need to) and keep it off. It gives you the nutrition and energy your body needs and helps prevent heart disease. But a diet for diabetes does not mean that you have to eat special foods. You can eat what your family eats, including occasional sweets and other favorites. But you do have to pay attention to how often you eat and how much you eat of certain foods. The right plan for you will give you meals that help you keep your blood sugar at healthy levels. Try to eat a variety of foods and to spread carbohydrate throughout the day. Carbohydrate raises blood sugar higher and more quickly than any other nutrient does. Carbohydrate is found in sugar, breads and cereals, fruit, starchy vegetables such as potatoes and corn, and milk and yogurt. You may want to work with a dietitian or diabetes educator to help you plan meals and snacks. A dietitian or diabetes educator also can help you lose weight if that is one of your goals. The following tips can help you enjoy your meals and stay healthy. Follow-up care is a key part of your treatment and safety. Be sure to make and go to all appointments, and call your doctor if you are having problems. Its also a good idea to know your test results and keep a list of the medicines you take. How can you care for yourself at home? · Learn which foods have carbohydrate and how much carbohydrate to eat. A dietitian or diabetes educator can help you learn to keep track of how much carbohydrate you eat. · Spread carbohydrate throughout the day. Eat some carbohydrate at all meals, but do not eat too much at any one time. · Plan meals to include food from all the food groups. These are the food groups and some example portion sizes: ¨ Grains: 1 slice of bread (1 ounce), ½ cup of cooked cereal, and 1/3 cup of cooked pasta or rice. These have about 15 grams of carbohydrate in a serving. Choose whole grains such as whole wheat bread or crackers, oatmeal, and brown rice more often than refined grains. ¨ Fruit: 1 small fresh fruit, such as an apple or orange; ½ of a banana; ½ cup of chopped, cooked, or canned fruit; ½ cup of fruit juice; 1 cup of melon or raspberries; and 2 tablespoons of dried fruit. These have about 15 grams of carbohydrate in a serving. ¨ Dairy: 1 cup of nonfat or low-fat milk and 2/3 cup of plain yogurt. These have about 15 grams of carbohydrate in a serving. ¨ Protein foods: Beef, chicken, turkey, fish, eggs, tofu, cheese, cottage cheese, and peanut butter. A serving size of meat is 3 ounces, which is about the size of a deck of cards. Examples of meat substitute serving sizes (equal to 1 ounce of meat) are 1/4 cup of cottage cheese, 1 egg, 1 tablespoon of peanut butter, and ½ cup of tofu. These have very little or no carbohydrate per serving. ¨ Vegetables: Starchy vegetables such as ½ cup of cooked dried beans, peas, potatoes, or corn have about 15 grams of carbohydrate. Nonstarchy vegetables have very little carbohydrate, such as 1 cup of raw leafy vegetables (such as spinach), ½ cup of other vegetables (cooked or chopped), and 3/4 cup of vegetable juice. · Use the plate format to plan meals. It is a good, quick way to make sure that you have a balanced meal. It also helps you spread carbohydrate throughout the day. You divide your plate by types of foods. Put vegetables on half the plate, meat or meat substitutes on one-quarter of the plate, and a grain or starchy vegetable (such as brown rice or a potato) in the final quarter of the plate.  To this you can add a small piece of fruit and 1 cup of milk or yogurt, depending on how much carbohydrate you are supposed to eat at a meal. 
· Talk to your dietitian or diabetes educator about ways to add limited amounts of sweets into your meal plan. You can eat these foods now and then, as long as you include the amount of carbohydrate they have in your daily carbohydrate allowance. · If you drink alcohol, limit it to no more than 1 drink a day for women and 2 drinks a day for men. If you are pregnant, no amount of alcohol is known to be safe. · Protein, fat, and fiber do not raise blood sugar as much as carbohydrate does. If you eat a lot of these nutrients in a meal, your blood sugar will rise more slowly than it would otherwise. · Limit saturated fats, such as those from meat and dairy products. Try to replace it with monounsaturated fat, such as olive oil. This is a healthier choice because people who have diabetes are at higher-than-average risk of heart disease. But use a modest amount of olive oil. A tablespoon of olive oil has 14 grams of fat and 120 calories. · Exercise lowers blood sugar. If you take insulin by shots or pump, you can use less than you would if you were not exercising. Keep in mind that timing matters. If you exercise within 1 hour after a meal, your body may need less insulin for that meal than it would if you exercised 3 hours after the meal. Test your blood sugar to find out how exercise affects your need for insulin. · Exercise on most days of the week. Aim for at least 30 minutes. Exercise helps you stay at a healthy weight and helps your body use insulin. Walking is an easy way to get exercise. Gradually increase the amount you walk every day. You also may want to swim, bike, or do other activities. When you eat out · Learn to estimate the serving sizes of foods that have carbohydrate. If you measure food at home, it will be easier to estimate the amount in a serving of restaurant food. · If the meal you order has too much carbohydrate (such as potatoes, corn, or baked beans), ask to have a low-carbohydrate food instead. Ask for a salad or green vegetables. · If you use insulin, check your blood sugar before and after eating out to help you plan how much to eat in the future. · If you eat more carbohydrate at a meal than you had planned, take a walk or do other exercise. This will help lower your blood sugar. Where can you learn more? Go to LotLinx.be Enter S946 in the search box to learn more about \"Nutrition Tips for Diabetes: After Your Visit. \"  
© 9515-0722 Healthwise, Incorporated. Care instructions adapted under license by Ariel Roach (which disclaims liability or warranty for this information). This care instruction is for use with your licensed healthcare professional. If you have questions about a medical condition or this instruction, always ask your healthcare professional. [place] [address] any warranty or liability for your use of this information. Content Version: 78.0.074785; Current as of: 2014 [place] 
 
 
 Fransisca Keddie [facility] [address] 
[phone] Patient: Maximiliano Roca MRN: [id number] :1965 Tiene alergias a lo siguiente No tiene alergias Documentación recientes Height Weight BMI (IMC) Estatus de tabaquísmo 1.778 m 85.6 kg 26.32 kg/m2 Current Every Day Smoker Unresulted Labs Order Current Status AFB CULTURE + SMEAR W/RFLX ID FROM CULTURE Preliminary result Emergency Contacts Name Discharge Info Relation Home Work Mobile [address] CAREGIVER [3] Sister [23]   [phone] Jen Olvera  Other Relative [6]   [phone] Sobre gage hospitalización Le admitieron el:  2017 Gage tratamiento más reciente [address]:  [facility] [address] SURGICAL/ONCOLOGY Le dieron de julián el:  August 10, 2017 Número de teléfono de la unidad:  [phone] Por qué le ingresaron Gage diagnosis primaria es:  Cellulitis Gage diagnosis también incluye:  Hyperglycemia Due To Type 2 Diabetes Mellitus (Hcc), Hyponatremia, Blind Left Eye, Osteomyelitis Of Left Foot (Hcc), Pad (Peripheral Artery Disease) (Hcc), Gangrene Of Toe (Hcc) Proveedores de verse melinda paola hospitalizaciones Personal Médico Rol Especialidad Teléfono principal de la oficina Brayan Lorenzo MD Attending Provider Emergency Medicine [phone] Haider Anderson, DO Attending Provider Internal Medicine [phone] Prateek Briceno, DO Attending Provider Family Practice [phone] Gage médico de atención primaria (PCP ) Primary Care Physician Office Phone Office Fax NONE ** None ** ** None ** Follow-up Information Follow up With Details Comments Contact Info Amanda Ramirez DPM On 8/14/2017 Follow up appointment scheduled for August 14, 2017 at 3:30 p.m. Please arrive at 3:00 p.m. for check in. [address] 
[phone] [facility] PHYSICIANS On 8/29/2017 Follow up appointment scheduled for August 29, 2017 at 10:00 a.m. Please bring picture ID, list of meds, proof of income, $75.00 to appointment. Please arrive at 9:45 a.m. for check in. [address] [facility] [address] [phone] None   None ([phone]) Patient stated that they have no PCP Dr Orly Owens on 8/14/2017 time as given   
 [facility] clinic Schedule an appointment as soon as possible for a visit in 1 week re:diabetes [address] [facility] [address], [address] Aprobación de la gestión actual lista de medicamentos EMPIECE a eduarda Upstream Dosis e instrucciones Información de dispensación Comentarios Morning Noon Evening Bedtime  
 amoxicillin-clavulanate 875-125 mg per tablet También conocido venkat:  AUGMENTIN Your last dose was: Your next dose is:    
   
   
 Dosis:  1 Tab Take 1 Tab by mouth two (2) times a day. cantidad:  14 Tab  
recargas:  0  
     
   
   
   
  
 clopidogrel 75 mg Tab También conocido venkat:  PLAVIX Your last dose was: Your next dose is:    
   
   
 Dosis:  75 mg Take 1 Tab by mouth daily. cantidad:  30 Tab  
recargas:  1  
     
   
   
   
  
 glipiZIDE 10 mg tablet También conocido venkat:  Samantha Lat Your last dose was: Your next dose is:    
   
   
 Dosis:  10 mg Take 1 Tab by mouth Before breakfast and dinner. cantidad:  60 Tab  
recargas:  1  
     
   
   
   
  
 metFORMIN 1,000 mg tablet También conocido venkat:  GLUCOPHAGE Your last dose was: Your next dose is:    
   
   
 Dosis:  1000 mg Take 1 Tab by mouth two (2) times daily (with meals). cantidad:  60 Tab  
recargas:  1  
     
   
   
   
  
 simvastatin 40 mg tablet También conocido venkat:  Barak Vandana Your last dose was: Your next dose is:    
   
   
 Dosis:  40 mg Take 1 Tab by mouth nightly. cantidad:  30 Tab  
recargas:  1 Dónde puede recoger paola medicamentos Información sobre dónde obtener estos medicamentos se le dará a usted por la enfermera o el médico.   
 ! Pregunte a morley médico o enfermero/a sobre estos medicamentos  
  amoxicillin-clavulanate 875-125 mg per tablet  
 clopidogrel 75 mg Tab  
 glipiZIDE 10 mg tablet  
 metFORMIN 1,000 mg tablet  
 simvastatin 40 mg tablet Discharge Instructions Diabetic diet NO weight on left foot!!! 
Use walker Lab Results Component Value Date/Time  Hemoglobin A1c 11.2 07/24/2017 04:50 PM  
 
 
This lab test reflects that your blood sugar has been slightly elevated over the past 3 months and should be evaluated by your primary care provider. An A1C of 5.7-6.4% meets the criteria for pre-diabetes; an A1C of 6.5% or higher meets the criteria for diabetes. This lab test reflects that your blood sugar averaged 275 mg/dL  over the past 3 months. It is important to follow up with your provider on a routine basis to continue to evaluate your blood sugar and discuss any necessary changes in treatment. DISCHARGE SUMMARY from Nurse The following personal items are in your possession at time of discharge: 
 
Dental Appliances: None Visual Aid: None Jewelry: None Clothing: None Other Valuables: None PATIENT INSTRUCTIONS: 
 
After general anesthesia or intravenous sedation, for 24 hours or while taking prescription Narcotics: · Limit your activities · Do not drive and operate hazardous machinery · Do not make important personal or business decisions · Do  not drink alcoholic beverages · If you have not urinated within 8 hours after discharge, please contact your surgeon on call. Report the following to your surgeon: 
· Excessive pain, swelling, redness or odor of or around the surgical area · Temperature over 100.5 · Nausea and vomiting lasting longer than 4 hours or if unable to take medications · Any signs of decreased circulation or nerve impairment to extremity: change in color, persistent  numbness, tingling, coldness or increase pain · Any questions What to do at Home: 
Recommended activity: Activity as tolerated, no weight bearing on right foot If you experience any of the following symptoms increased pain not relieved by medication, fever over 100.5, increased drainage, please follow up with PCP. *  Please give a list of your current medications to your Primary Care Provider.  
 
*  Please update this list whenever your medications are discontinued, doses are 
 changed, or new medications (including over-the-counter products) are added. *  Please carry medication information at all times in case of emergency situations. These are general instructions for a healthy lifestyle: No smoking/ No tobacco products/ Avoid exposure to second hand smoke Surgeon General's Warning:  Quitting smoking now greatly reduces serious risk to your health. Obesity, smoking, and sedentary lifestyle greatly increases your risk for illness A healthy diet, regular physical exercise & weight monitoring are important for maintaining a healthy lifestyle You may be retaining fluid if you have a history of heart failure or if you experience any of the following symptoms:  Weight gain of 3 pounds or more overnight or 5 pounds in a week, increased swelling in our hands or feet or shortness of breath while lying flat in bed. Please call your doctor as soon as you notice any of these symptoms; do not wait until your next office visit. Recognize signs and symptoms of STROKE: 
 
F-face looks uneven A-arms unable to move or move unevenly S-speech slurred or non-existent T-time-call 911 as soon as signs and symptoms begin-DO NOT go Back to bed or wait to see if you get better-TIME IS BRAIN. Warning Signs of HEART ATTACK Call 911 if you have these symptoms: 
? Chest discomfort. Most heart attacks involve discomfort in the center of the chest that lasts more than a few minutes, or that goes away and comes back. It can feel like uncomfortable pressure, squeezing, fullness, or pain. ? Discomfort in other areas of the upper body. Symptoms can include pain or discomfort in one or both arms, the back, neck, jaw, or stomach. ? Shortness of breath with or without chest discomfort. ? Other signs may include breaking out in a cold sweat, nausea, or lightheadedness. Don't wait more than five minutes to call [address]!  Fast action can save your life. Calling 911 is almost always the fastest way to get lifesaving treatment. Emergency Medical Services staff can begin treatment when they arrive  up to an hour sooner than if someone gets to the hospital by car. The discharge information has been reviewed with the patient. The patient verbalized understanding. Discharge medications reviewed with the patient and niece and appropriate educational materials and side effects teaching were provided. Patient armband removed and shredded] Discharge Instructions Attachments/References DIABETES DIET MEAL PLANNING: GENERAL INFO (Pakistani) DIABETES: BLOOD SUGAR EMERGENCIES (Pakistani) DIABETES: ALCOHOL (Pakistani) DIABETES DIET GUIDELINES: GENERAL INFO (Pakistani) DIABETES: NUTRITION TIPS (Pakistani) Discharge Orders Combined Effort Introducing [facility]! [facility] introduce portal paciente MyChart . Ahora se puede acceder a partes de morley expediente médico, enviar por correo electrónico la oficina de morley médico y solicitar renovaciones de medicamentos en línea. En morley navegador de Internet , Callie Huge a [email or web address] Ruby clic en el usuario por Gilberto Morse? Fermin jarvisic aquí en la sesión [facility]. Verá la página de registro [place]. Ingrese morley código de [facility] Kate nilesh y venkat aparece a continuación. Usted no tendrá que [employer] código después de jad completado el proceso de registro . Si usted no se inscribe antes de la fecha de caducidad , debe solicitar un nuevo código. · MyChart Código de acceso : [facility] Expires: 10/22/2017  4:50 PM 
 
Ingresa los últimos cuatro dígitos de morley Número de Seguro Social ( xxxx ) y fecha de nacimiento ( dd / mm / aaaa ) venkat se indica y ruby clic en Enviar. Usted será llevado a la siguiente página de registro . Crear un ID MyChart .  Esta será morley ID de inicio de sesión de MyChart y no puede ser [place] , por lo que pensar en nadiya que es yepez y fácil de recordar . Crear nadiya contraseña MyChart . Usted puede cambiar morley contraseña en cualquier momento . Ingrese morley Password Reset de preguntas y [place] . Schurz se puede utilizar en un momento posterior si usted olvida morley contraseña. Introduzca morley dirección de correo electrónico . Dearl Bird recibirá nadiya notificación por correo electrónico cuando la nueva información está disponible en [employer]t . Creed Hipps clic en Registrarse. Viadaryle Des bailee y descargar porciones de morley expediente médico. 
Kelsey clic en el enlace de descarga del menú Resumen para descargar nadiya copia portátil de morley información médica . Si tiene [employer] , por favor visite la sección de preguntas frecuentes del sitio web [employer] . Recuerde, MyChart NO es que se utilizará para las necesidades urgentes. Para emergencias médicas , llame al 911 . Ahora disponible en morley iPhone y Android ! General Information Please provide this summary of care documentation to your next provider. Patient Signature:  ____________________________________________________________ Date:  ____________________________________________________________  
  
Ritchie Reece Provider Signature:  ____________________________________________________________ Date:  ____________________________________________________________ More Information Aprenda acerca de la planificación de comidas para la diabetes - [ Learning About Meal Planning for Diabetes ] Por qué planificar las comidas? La planificación de comidas puede ser Yasmine sheparde crucial del manejo de la diabetes.  Planificar las comidas y los refrigerios con el equilibrio correcto de carbohidratos, proteínas y grasas puede ayudarle a mantener los niveles de azúcar en la joaquin dentro de los límites ideales que estableció junto con morley médico. 
 No tiene que comer alimentos especiales. Puede comer lo mismo que morley kain, incluso dulces de vez en cuando. Sarah debe prestar atención a la cantidad y la frecuencia con que come ciertos alimentos. José Manuel vez desee colaborar con un dietista o un educador en diabetes certificado. Él o elizabeth puede darle consejos y sugerencias de comidas y puede responder a paola preguntas sobre la planificación de comidas. Sharri profesional sanitario también puede ayudarle a alcanzar un peso saludable si dalton es christine de paola objetivos. Qué plan es adecuado para usted? Morley dietista o educador en diabetes puede sugerirle que empiece con el formato de plato o el recuento de carbohidratos. Formato de plato El formato de plato es nadiya manera sencilla de ayudarle a controlar la manera en que se [place]. Usted planifica paola comidas aprendiendo a bailee la cantidad de espacio que cada alimento debería ocupar en un plato. Usar el formato de plato le ayuda a distribuir los carbohidratos melinda el día. Puede hacer que le resulte más fácil mantener el nivel de azúcar en la joaquin dentro de los límites ideales. También le ayuda a bailee si está comiendo porciones de un tamaño saludable. Para usar el formato de plato, llene la mitad del plato con verduras sin almidón. Añada carne o sustitutos de carne en un cuarto del plato. Ponga nadiya verdura con almidón o un grano (venkat arroz integral o nadiya papa) en el último cuarto del plato. Puede agregar Moe Gurney pequeña pieza de fruta y [address] o yogur descremado o semidescremado, dependiendo de morley meta de carbohidratos para cada comida. Estos son algunos consejos para usar el formato de plato: · Asegúrese de no estar usando un plato demasiado katherine. Lo mejor es usar un plato de 9 pulgadas (23 cm). Muchos restaurantes usan platos más grandes. · Acostúmbrese a usar el formato de plato en casa. De dalton modo puede luego usarlo cuando coma afuera. · Anote las preguntas que tenga acerca de usar el formato de [place]. Hable con morley médico, dietista o educador en diabetes sobre paola inquietudes. Recuento de carbohidratos Con el recuento de carbohidratos, usted planifica las comidas de acuerdo a la cantidad de carbohidratos en cada alimento. Los carbohidratos aumentan el nivel de azúcar en la Pranav y con mayor rapidez que otros nutrientes. Se encuentran en postres, panes y cereales y en la fruta. También se encuentran en las verduras con almidón, tales venkat las joanne y [place], los granos venkat el arroz y la pasta, así venkat en la [place] y el yogur. Distribuir el consumo de carbohidratos a lo denny del día le ayuda a mantener el azúcar en la joaquin en los límites ideales. Morley cantidad diaria depende de varios factores, incluyendo morley peso, nivel de [place], los Pam-[place]u khanh para la diabetes y los objetivos que tenga para paola niveles de azúcar en la joaquin. Un dietista registrado o un educador en diabetes puede ayudarle a planear cuántos carbohidratos incluir en cada comida y refrigerio. Nadiya guía para la cantidad diaria de carbohidratos es: · Entre 39 y 61 gramos en cada comida. Eso equivale a 3 o 4 porciones de carbohidratos, aproximadamente. · Entre 15 y 21 gramos para cada refrigerio. Eso equivale a 1 porción de carbohidratos, aproximadamente. La etiqueta nutricional de los alimentos envasados le indica la cantidad de carbohidratos que hay en nadiya porción de dalton alimento. Kush, nelly cuál es el tamaño de la porción que indica la [place]. Es riddhi porción la cantidad que usted come de Sandra sibley? Toda la información nutricional en nadiya etiqueta se basa en el tamaño de la porción. Así que si usted come Sandra mayor o sally cantidad, tendrá [employer] cifras. La cantidad total de carbohidratos es lo siguiente que debe buscar en la etiqueta. Si cuenta las porciones de carbohidratos, nadiya porción de carbohidratos equivale a 15 gramos. Para los alimentos que no tienen etiquetas, venkat las frutas y las verduras frescas, usted necesitará nadiya guía que enumere la cantidad de carbohidratos que contienen esos alimentos. Pregúntele a morley médico, dietista o educador en diabetes acerca de libros o guías de nutrición que [employer]. Si [place] insulina, necesita saber cuántos gramos de carbohidratos hay en nadiya comida. [place] le permite saber cuánta insulina de acción rápida necesita antes de comer. Si [place] nadiya bomba de [place], usted recibe Spero Roys cantidad laine de insulina a lo denny del día. Por lo tanto, debe programar la bomba en las comidas para que le suministre insulina adicional a fin de cubrir el aumento del azúcar en la joaquin después de las comidas. Cuando usted sabe qué cantidad de carbohidratos consumirá, puede programar la cantidad correcta de [place]. O si [place] siempre la misma dosis de insulina, tendrá que asegurarse de consumir la misma cantidad de carbohidratos en cada comida. Si necesita ayuda adicional para comprender el recuento de carbohidratos y las etiquetas de nutrición, pregúntele a morley médico, dietista o educador en diabetes. Cómo puede empezar a usar la planificación de comidas? Estos son algunos consejos para empezar: · Planifique las comidas para toda la semana por anticipado. No se olvide de incluir los refrigerios. · Use libros de cocina o recetas en Internet para planificar varias comidas principales. Planifique algunas comidas rápidas para las noches en las que esté ocupado. También puede cocinar el doble de algunas comidas que se puedan congelar. Puede guardar la mitad para otras noches en las que esté ocupado y no tenga tiempo para cocinar. · Asegúrese de que tenga los ingredientes que necesita para paola recetas. Si tiene poca cantidad de algunos artículos básicos, añádalos a la lista de la compra.  
· [employer]onic lista de alimentos que [place] para preparar desayunos, almuerzos y refrigerios. Anote cantidades abundantes de frutas y verduras. · Coloque esta lista en la grey del refrigerador. Siga añadiendo cosas según se le Maria Esther Tushard ocurriendo. · Lleve la lista consigo cuando vaya a hacer las compras semanales. La atención de seguimiento es nadiya parte clave de morley tratamiento y seguridad. Asegúrese de hacer y acudir a todas las citas, y llame a morley médico si está teniendo problemas. También es nadiya buena idea saber los resultados de paola exámenes y mantener nadiya lista de los medicamentos que khanh. Dónde puede encontrar más información en inglés? Claire Dhillon a [email or web address] Christine Mcneill en la búsqueda para aprender más acerca de \"Aprenda acerca de la planificación de comidas para la diabetes - [ Learning About Meal Planning for Diabetes ]. \" 
Revisado: 13 marzo, 2017 Versión del contenido: 11.3 © 2959-2280 Healthwise, Incorporated. Las instrucciones de cuidado fueron adaptadas bajo licencia por Good Help Connections (which disclaims liability or warranty for this information). Si usted tiene [place] afección médica o sobre estas instrucciones, siempre pregunte a morley profesional de camron. [employer] niega toda garantía o responsabilidad por morley uso de esta información. Urgencias diabéticas causadas por los niveles de azúcar en la joaquin: Morley plan de acción - [ Diabetes Blood Sugar Emergencies: Your Action Plan ] Cómo puede prevenir nadiya urgencia causada por los niveles de azúcar en la joaquin? Mantener los niveles de azúcar en la joaquin dentro de los límites ideales para usted es nadiya parte importante de vivir con diabetes. Tendrá que saber lo que hacer si están muy altos o muy bajos. Controlar paola niveles de azúcar en la joaquin le ayuda a evitar urgencias.  Esta hoja de cuidados le enseñará acerca de las señales de los niveles altos o bajos de azúcar en la joaquin. Langeloth Lord a preparar un plan de acción junto con morley médico para el mxaim de que se presenten estas señales. Es más probable que se presente un nivel bajo de azúcar en la joaquin si usted khanh determinados medicamentos para la diabetes. También puede ocurrir si se salta nadiya comida, capri alcohol, o hace más ejercicio que de costumbre. Es posible que tenga un nivel alto de azúcar en la joaquin si come de Jennifer diferente de venkat suele hacerlo. Un ejemplo es comer más carbohidratos que de costumbre. Tener un resfriado, la gripe u otra enfermedad repentina puede causar niveles altos de azúcar en la joaquin. Los niveles también pueden elevarse si usted se salta nadiya dosis de [place]. Cualquier cambio en la manera en que khanh morley medicamento puede afectar morley nivel de azúcar en la joaquin. Por lo tanto, es importante que colabore con morley médico antes de hacer cualquier cambio. Revísese el azúcar en la joaquin Colabore con morley médico para completar los espacios en bland de abajo que se apliquen a usted. Lleve un registro de paola niveles, conozca paola límites ideales, y apunte modos en que puede hacer que morley azúcar en la joaquin vuelva a paola límites ideales. Un cuaderno puede ayudarle a llevar un registro de paola niveles. Lleve el cuaderno consigo a todas paola citas médicas. · Revísese el azúcar en la joaquin _____ veces al día, a estas horas:__________________________________________ (Por ejemplo: antes de las comidas, a la hora de [place], antes de hacer ejercicio, melinda el ejercicio, a otras horas). · Morley nivel ideal de azúcar en la joaquin antes de nadiya comida es ___________________. Morley nivel ideal de azúcar en la joaquin después de [place] comida es ___________________.  
· Kelsey esto___________________________________________________para lograr que morley azúcar en la joaquin vuelva a estar dentro de límites seguros si los resultados de medirse el azúcar en la joaquin son _________________________________________. (Por ejemplo: menos de 70 o más de 250 mg/dL.) Llame a morley médico si los resultados de medirse el nivel de azúcar en la joaquin son ___________________________________. (Por ejemplo: menos de 70 o más de 250 mg/dL). Cuáles son los síntomas de los niveles bajos y altos de azúcar en la joaquin? Los síntomas comunes de un nivel bajo de azúcar en la joaquin son sudoración y sensación de temblor, debilidad, [place] o confusión. Los síntomas pueden comenzar rápidamente. Los síntomas comunes de un nivel alto de azúcar en la joaquin son sensación de tener mucha sed o mucha hambre. También podría orinar con más frecuencia de lo habitual. Podría bailee borroso y bajar de peso sin haberlo intentado. No obstante, los niveles altos o bajos de azúcar en la joaquin nilesh vez no causen síntomas en [employer]. Ezequiel es nadiya buena razón para revisarse el nivel de azúcar en la joaquin periódicamente. Qué debe hacer si tiene síntomas? Colabore con morley médico para completar los espacios en bland de abajo que se apliquen a usted. Niveles bajos de azúcar en la joaquin Si tiene síntomas de azúcar bajo en la joaquin, revísese los [place] de azúcar. Si están por debajo de _____ (por ejemplo, por debajo de 70), coma o ovidio un alimento de azúcar de asimilación rápida que tenga unos 15 gramos de carbohidratos. Morley meta es conseguir que paola niveles vuelvan a los límites seguros para usted. Vuelva a revisarse el nivel de azúcar en la joaquin 15 minutos más tarde. Si todavía no están dentro de paola límites ideales, consuma otros 15 gramos de carbohidratos y revísese el azúcar en la joaquin otra vez en 15 minutos. Repita esto hasta que llegue a paola límites. Luego, regrese a paola horarios normales para medirse el azúcar en la joaquin.  
Cuando usted tiene bajo el azúcar en la joaquin, es mejor detenerse y reducir cualquier actividad física hasta que el azúcar en la joaquin Harolyn Poet a estar dentro de paola límites ideales y esté estable. Si tiene [employer], coma o ovidio 30 gramos de carbohidratos. June Lozoya a revisarse el azúcar en la joaquin en 15 minutos. Si no está dentro de los [place], tome otros 30 gramos de carbohidratos. Mídase el azúcar en la joaquin otra vez en 15 minutos. Siga haciendo [employer] [facility] llegue a paola límites ideales. Entonces puede volver a paola horarios normales de medición del azúcar. Si paola síntomas o niveles de azúcar en la joaquin están empeorando o no hammond calderon después de 15 minutos, [place] atención médica de inmediato. Estos son algunos ejemplos de alimentos de azúcar rápido que contienen 15 gramos de carbohidratos: · 3 o 4 pastillas de glucosa · 1 tubo de gel de glucosa · Caramelos duros (venkat 3 [address] 5 a 7 [employer]) · ½ taza a ¾ taza (4 a 6 onzas) de Aliene Cali de fruta o gaseosa regular (no dietética) Niveles altos de azúcar en la joaquin Si tiene síntomas de azúcar alto en la joaquin, revísese los [place] de azúcar. Morley meta es conseguir que paola niveles vuelvan a los límites ideales para usted. Si están por encima de ______ (por ejemplo, por encima de 250), siga estos pasos: · Si se saltó nadiya dosis de morley medicamento para la diabetes, tómelo ahora. [place] solo la cantidad de medicamento que le hayan recetado. No tome ni más ni menos medicamento. · Aplíquese insulina si el médico se la ha recetado para los niveles altos de azúcar en la joaquin. · Analícese las cetonas, si el médico le indicó que lo ruby. Si los [employer] del análisis de cetonas muestran nadiya cantidad de moderada a julián de cetonas, llame al médico para pedirle consejo. · Espere 30 minutos después de administrarse insulina adicional o de eduarda los medicamentos que no había tomado. Vuelva a controlar morley nivel de azúcar en la joaquin.  
Si paola síntomas o niveles de azúcar en la joaquin están empeorando o no hammond Abdomen soft, non-tender, no guarding. aclderon después de seguir estos pasos, [place] atención médica de inmediato. La atención de seguimiento es nadiya parte clave de gage tratamiento y seguridad. Asegúrese de hacer y acudir a todas las citas, y llame a gage médico si está teniendo problemas. También es nadiya buena idea saber los resultados de paola exámenes y mantener nadiya lista de los medicamentos que khanh. Dónde puede encontrar más información en inglés? Letty Salgado a [email or web address] Ziggy Real [id number] en la búsqueda para aprender más acerca de \"Urgencias diabéticas causadas por los niveles de azúcar en la joaquin: Gage plan de acción - [ Diabetes Blood Sugar Emergencies: Your Action Plan ]. \" 
Revisado: 13 marzo, 2017 Versión del contenido: 11.3 © 0875-9990 Healthwise, Incorporated. Las instrucciones de cuidado fueron adaptadas bajo licencia por Good [employer] Connections (which disclaims liability or warranty for this information). Si usted tiene [place] afección médica o sobre estas instrucciones, siempre pregunte a gage profesional de camron. Healthwise, Incorporated niega toda garantía o responsabilidad por gage uso de esta información. La diabetes y el alcohol: Instrucciones de cuidado - [ Diabetes and Alcohol: Care Instructions ] Instrucciones de cuidado Las personas que tienen diabetes deben tener más cuidado con el alcohol. Antes de beber, considere algunas cosas: 
Está minor controlada gage diabetes? Conoce usted la forma en la que el alcohol puede afectar gage camron? Tiene presión arterial julián, daño en los nervios o problemas en los ojos debido a gage diabetes? Si usted se inyecta insulina o khanh otro medicamento para la diabetes, beber alcohol podría bajar el nivel de azúcar en la joaquin. [place] puede causar niveles bajos de azúcar en la joaquin que pueden resultar peligrosos.  
Demasiado alcohol también puede afectar gage capacidad de detectar que el azúcar en morley joaquin está bajo y tratarlo. Beber alcohol puede hacer que se sienta aturdido al principio y somnoliento (con sueño) a medida que capri [place]. Ambas cosas pueden ser similares a los síntomas de un nivel bajo de azúcar en la joaquin. Beber mucho alcohol melinda un período denny puede dañar el hígado (cirrosis). Si esto sucede, morley cuerpo podría perder morley reacción natural para protegerse contra el nivel bajo de azúcar en la joaquin. Si usted está controlando morley diabetes y no tiene otros problemas de [place], quizás esté minor eduarda nadiya bebida de vez en cuando. Aprender Chavez [employer] del alcohol en morley cuerpo puede ayudarle a eduarda las decisiones correctas. La atención de seguimiento es nadiya parte clave de morley tratamiento y seguridad. Asegúrese de hacer y acudir a todas las citas, y llame a morley médico si está teniendo problemas. También es nadiya buena idea saber los resultados de paola exámenes y mantener nadiya lista de los medicamentos que khanh. Cómo puede cuidarse en el hogar? Si acostumbra a beber alcohol · Hable con morley médico u otro experto en diabetes para averiguar qué es lo mejor para usted. Asegúrese de saber si es seguro beber alcohol si usted se inyecta insulina o khanh otro medicamento para la diabetes. · En general, limite el alcohol a 1 bebida al día con nadiya comida si es todd. Si es hombre, limite el alcohol a 2 bebidas al día con la comida. Lo siguiente se Darleene Julian bebida estándar: ¨ Nadiya botella de 12 onzas (355 ml) de cerveza o bebida a base de vino (\"wine cooler\") ¨ Un vaso de 5 onzas (148 ml) de vino ¨ Un cóctel con 1.5 onzas (44 ml) de [employer] [facility] [employer] [address], venkat [address]. jeromy, whisky o maryann · Elija paola bebidas alcohólicas con cuidado. Con el alcohol erwin, use bebidas para mezclar sin azúcar, venkat agua tónica dietética, agua o agua con gas. Escoja bebidas que tengan menos alcohol, venkat la cerveza \"light\" o el vino seco. O añada agua con gas al vino para diluirlo.  Hilary Neff recuerde [facility] de las bebidas alcohólicas tienen muchas calorías. · Cuando ovidio, revise morley nivel de azúcar en la joaquin antes de irse a dormir. Coma un refrigerio antes de irse a la cama para que el nivel de azúcar en la joaquin no baje mientras duerme. Cuándo no debe beber · Nunca ovidio con el estómago vacío. Si capri alcohol, bébalo solo con nadiya comida o un refrigerio. Franco hasta 2 bebidas con el estómago vacío puede causar un nivel bajo de azúcar en la [place]. · No ovidio alcohol si tiene problemas para saber las señales de un nivel bajo de azúcar en la joaquin hasta que se vuelvan graves. · No ovidio alcohol después de hacer ejercicio. El ejercicio baja el nivel de azúcar en la joaquin. · No ovidio si tiene daño en los nervios. Beber puede empeorarlo y aumentar el dolor, el entumecimiento y otros síntomas. · No ovidio si tiene presión arterial julián. · No ovidio si tiene nadiya enfermedad en los ojos relacionada con la diabetes. · No ovidio si tiene elevados los triglicéridos en la joaquin (los triglicéridos son un tipo de grasa en la joaquin). Beber alcohol puede aumentar los triglicéridos. · No ovidio si está tratando de bajar de [place]. El alcohol aporta calorías vacías que no le brindan ningún nutriente. · No ovidio si va a conducir. Los efectos del alcohol son mayores si tiene un nivel bajo de azúcar en la joaquin. Cuándo debe pedir ayuda? Llame al 911 en cualquier momento que considere que necesita atención de [place]. Por ejemplo, llame si: 
· Se desmayó (perdió el conocimiento). · Está confuso o no puede pensar con claridad. · Morley nivel de azúcar en la joaquin es muy alto o [place]. Preste especial atención a los cambios en morley camron y asegúrese de comunicarse con morley médico si: 
· Morley nivel de azúcar en la joaquin permanece fuera de los límites ideales que el médico steward establecido para usted. · Tiene cualquier problema. Dónde puede encontrar más información en inglés? Abdias Apodaca a [email or web address] Escriba T236 en la búsqueda para aprender más acerca de \"La diabetes y el alcohol: Instrucciones de cuidado - [ Diabetes and Alcohol: Care Instructions ]. \" 
Revisado: 13 marzo, 2017 Versión del contenido: 11.3 © 2975-5896 Healthwise, Incorporated. Las instrucciones de cuidado fueron adaptadas bajo licencia por Good Help Connections (which disclaims liability or warranty for this information). Si usted tiene [place] afección médica o sobre estas instrucciones, siempre pregunte a morley profesional de camron. Healthwise, Incorporated niega toda garantía o responsabilidad por morley uso de esta información. Aprenda acerca de las pautas alimentarias para diabetes - [ Learning About Diabetes Food Guidelines ] Instrucciones de cuidado La planificación de las comidas es importante para el manejo de la diabetes. Ayuda a mantener el azúcar en la joaquin en el nivel ideal (que usted fija con morley médico). Usted no tiene que comer alimentos especiales. Puede comer lo mismo que morley kain, incluso dulces de vez en cuando. Sarah debe prestar atención a la cantidad y la frecuencia con que come ciertos alimentos. José Manuel vez desee colaborar con un dietista o educador de diabetes certificado (CDE, por paola siglas en inglés) para que le ayuden a planificar las comidas y los refrigerios. Un dietista o CDE también puede ayudarle a bajar de peso si dalton es christine de paola objetivos. Vivia Manus saber acerca de ingerir carbohidratos? Manejar la cantidad de carbohidratos que ingiere es nadiya parte importante de la alimentación saludable cuando tiene diabetes. Los carbohidratos se encuentran en muchos alimentos. · Sepa qué alimentos contienen carbohidratos. Y aprenda qué cantidad de carbohidratos contienen los diferentes alimentos. ¨ El pan, los cereales, la pasta y el arroz tienen aproximadamente 15 gramos de carbohidratos por porción.  Nadiya porción equivale a 1 rebanada de pan (1 onza o 28 g), ½ taza de cereal cocido o 1/3 de taza de pasta o arroz cocidos. ¨ Las frutas tienen 15 gramos de carbohidratos por porción. Mary Rave porción es 1 fruta fresca pequeña, venkat nadiya [place] karla o nadiya naranja; ½ banana (plátano); ½ taza de fruta cocida o enlatada; ½ taza de jugo de fruta; 1 taza de melón o frambuesas; o 2 cucharadas de frutas secas. ¨ La leche y el yogur sin azúcar agregado tienen 15 gramos de carbohidratos por porción. Mary Rave porción es 1 taza de [place] o 2/3 taza de yogur sin azúcar agregado. ¨ Las verduras con almidón tienen 15 gramos de carbohidratos por porción. Nadiya porción es ½ taza de puré de papa o camote (batata, boniato); 1 taza de calabacín; ½ papa horneada pequeña; ½ taza de frijoles cocidos; o ½ taza de maíz (elote) o arvejas (chícharos) cocidos. · Aprenda cuántos carbohidratos debe consumir cada día y en cada comida. Un dietista o CDE le puede enseñar cómo llevar la cuenta de los carbohidratos que consume. A esto se le llama recuento de carbohidratos. · Si no está seguro de cómo [employer] gramos de carbohidratos, utilice el Método del [place] para planificar las comidas. Es nadiya Dariel Pranav y rápida de asegurarse de que consuma comidas equilibradas. También le ayuda a distribuir los carbohidratos melinda el día. ¨ Divida el plato por tipo de alimento. Llene medio plato con verduras sin almidón, ponga carne u otras proteínas en nadiya cuarta parte del plato y granos o verduras con almidón en el último cuarto del plato. A esto puede agregarle un pequeño pedazo de fruta y 3 taza de [place] o yogur, según la cantidad de carbohidratos que deba consumir en nadiya comida. · Trate de comer aproximadamente la misma cantidad de carbohidratos en cada comida. No \"reserve\" morley cantidad diaria de carbohidratos para consumirlos en nadiya jocelyn comida. · Las proteínas contienen muy pocos o nada de carbohidratos por porción.  Los ejemplos de proteínas incluyen carne de guicho, [place] mart[place], [place], [place], huevos, tofu, queso, requesón (\"cottage cheese\") y la mantequilla de cacahuate [place]). Nadiya porción de carne son 3 onzas (85 g), lo cual es aproximadamente del tamaño de nadiya baraja de naipes. Los ejemplos de porciones de sustitutos de la carne (equivalente a 1 onza o 28 g de carne) son 1/4 de taza de requesón, 1 huevo, 1 cucharada de [place] de cacahuate y ½ taza de tofu. Cómo puede comer fuera y aún así comer de modo saludable? · Aprenda a calcular los tamaños de las porciones de alimentos que contienen carbohidratos. Si mide la comida en casa, será más fácil calcular la cantidad en nadiya porción de comida de restaurante. · Si el platillo que pide contiene demasiados carbohidratos (venkat joanne, maíz o frijoles al horno), pida un alimento bajo en carbohidratos en gage lugar. Pida nadiya ensalada o verduras. · Si [place] insulina, revise gage azúcar en la joaquin antes y después de comer fuera para ayudarle a planear cuánto comer en el futuro. · Si usted come más carbohidratos de lo planeado en nadiya comida, dé un paseo o ruby otro tipo de ejercicio. [place] ayudará a reducir el azúcar en la joaquin. Qué más debería saber? · Limite las grasas saturadas, venkat la grasa de la carne y productos lácteos. Esta es nadyia opción saludable, porque las personas que tienen diabetes tienen un mayor riesgo de enfermedades del corazón. Así que elija gill magros de carne y productos lácteos descremados o semidescremados. Utilice aceite de kilgore o de canola en lugar de mantequilla o manteca al cocinar. · No se salte comidas. Gage nivel de azúcar en la joaquin puede bajar demasiado si usted se salta comidas y khanh insulina o ciertos medicamentos para la diabetes. · Consulte con gage médico antes de beber alcohol. El alcohol puede hacer que gage azúcar en la joaquin baje demasiado. El alcohol también puede causar nadiya reacción adversa si usted khanh ciertos medicamentos para la diabetes. La atención de seguimiento es nadiya parte clave de morley tratamiento y seguridad. Asegúrese de hacer y acudir a todas las citas, y llame a morley médico si está teniendo problemas. También es nadiya buena idea saber los resultados de los exámenes y mantener nadiya lista de los medicamentos que khanh. Dónde puede encontrar más información en inglés? Jeff Yuen a http://angeles-karsten.info/. Umang CARPENTER en la búsqueda para aprender más acerca de \"Aprenda acerca de las pautas alimentarias para diabetes - [ Learning About Diabetes Food Guidelines ]. \" 
Revisado: 13 marzo, 2017 Versión del contenido: 11.3 © 9489-7430 Healthwise, Incorporated. Las instrucciones de cuidado fueron adaptadas bajo licencia por [employer] (which disclaims liability or warranty for this information). Si usted tiene [place] afección médica o sobre estas instrucciones, siempre pregunte a morley profesional de camron. Healthwise, Incorporated niega toda garantía o responsabilidad por morley uso de esta información. Consejos de nutrición para la diabetes: Después de la consulta [Nutrition Tips for Diabetes: After Your Visit] Instrucciones de cuidado Iman Olivia saludable es importante para el manejo de la diabetes. Puede ayudarle a bajar de peso (si lo necesita) y a no recuperarlo. Esta dieta le da los nutrientes y la energía que morley cuerpo necesita y le ayuda a prevenir enfermedades cardíacas (del corazón). Sin embargo, esto no significa que en nadiya dieta para personas con diabetes usted tenga que consumir alimentos especiales. Usted [employer] mismos alimentos que morley kain, incluso dulces ocasionalmente y otros alimentos favoritos. Sarah debe tener en cuenta la cantidad y la frecuencia con que come ciertos alimentos. El plan correcto para usted incluirá alimentos que le ayuden a mantener morley nivel de azúcar en la joaquin en límites sanos.  
Trate de comer alimentos variados y distribuya los carbohidratos melinda todo el día. Los carbohidratos aumentan el nivel de azúcar en la joaquin y lo hacen con mayor rapidez que otros nutrientes. Estos pueden encontrarse en el azúcar, los panes y cereales, las frutas, en los vegetales con almidones, venkat las joanne y [place], y en la [employer] y el yogur. Sería conveniente que colabore con un dietista o educador de diabetes para que le ayuden a planificar las comidas y los refrigerios. Si christine de paola objetivos es la pérdida de [place], un dietista o educador de diabetes puede ayudarle. Los consejos que siguen a continuación pueden ayudarle a disfrutar paola comidas y [employer]. La atención de seguimiento es nadiya parte clave de morley tratamiento y seguridad. Asegúrese de hacer y acudir a todas las citas, y llame a morley médico si está teniendo problemas. También es nadiya buena idea saber los resultados de los exámenes y mantener nadiya lista de los medicamentos que khanh. Cómo puede cuidarse en el hogar? · Aprenda a distinguir los alimentos con carbohidratos y la cantidad de carbohidratos que debe consumir. Un dietista o educador de diabetes puede enseñarle a llevar un control de la cantidad de carbohidratos que consume. · Distribuya los carbohidratos a lo denny del día. Consuma nadiya pequeña porción de carbohidratos en cada nadiya de las comidas, cirilo no demasiado de nadiya jocelyn vez. · Planifique paola comidas para que incluyan alimentos de todos los grupos alimentarios. Estos son los grupos alimenticios y algunos ejemplos de tamaños de porciones: ¨ Granos: 1 rebanada de pan (1 onza o 28 g), ½ taza de cereal cocido y 1/3 de taza de pasta o arroz cocidos. Estas raciones contienen alrededor de 15 gramos de carbohidratos por porción. Elija comer granos enteros, venkat pan o galletas saladas integrales, nhung y Deadra Fam integral, con mayor frecuencia que granos refinados.  
¨ Frutas: 1 fruta pequeña fresca, venkat nadiya manzana o nadiya naranja; ½ banana (plátano); ½ taza de fruta picada, cocida o enlatada; ½ taza de jugo de fruta; 1 taza de melón o frambuesa; y 2 cucharadas de frutas secas. Estas raciones contienen alrededor de 15 gramos de carbohidratos por porción. ¨ Productos lácteos: 1 taza de [place] y 2/3 de taza de yogur natural. Estas raciones contienen alrededor de 15 gramos de carbohidratos por porción. ¨ Proteínas: Carne de seymour, lillian, pavo, pescado, huevos, tofu, queso, requesón y [address] de cacahuate [place]). Kimberly porción de carne es 3 onzas, que es aproximadamente el tamaño de kimberly baraja de naipes. Ejemplos de porciones de sustitutos de la carne (igual a 1 onza de carne) son 1/4 de taza de requesón, 1 huevo, 1 cucharada de [address] de cacahuate y ½ taza de tofu. Estos alimentos contienen muy poca cantidad o nada de carbohidratos por porción. ¨ Vegetales: Los vegetales con almidón venkat ½ taza de frijoles (habichuelas) secos cocidos, arvejas (chícharos), joanne o maíz contienen alrededor de 15 gramos de carbohidratos. Los vegetales sin almidón contienen muy pocos carbohidratos, venkat 1 taza de verduras de hojas verdes crudas (venkat la espinaca), ½ taza de otro vegetal (cocido o margy) y 3/4 de taza de jugo de verduras. · Use el formato del plato para planificar paola comidas. Es kimberly [place] y rápida de asegurarse de que consuma comidas balanceadas. También le ayuda a distribuir los carbohidratos melinda el día. Divida el plato por tipo de alimento. [address] [place] verduras en medio plato, la carne o paola sustitutos en un cuarto del plato y los granos o verduras con almidones (venkat arroz integral o kimberly papa) en la cuarta parte restante del plato. A esto puede agregarle un pequeño trozo de fruta y [place] taza de [place] o yogur, según la cantidad de carbohidratos que deba consumir en kimberly comida.  
· Consulte a morley dietista o educador de diabetes [facility] gregr cantidades limitadas de dulces en morley plan alimenticio. Usted puede comer estos alimentos de vez en cuando, siempre que incluya la cantidad de carbohidratos que contienen en la cantidad permitida diaria. · Si capri alcohol, limite el consumo a no más que 1 bebida al día si es todd y 2 bebidas al día si es hombre. Si está embarazada, ninguna cantidad de alcohol es yepez. · Las proteínas, grasas y fibras no aumentan tanto el nivel de azúcar en la joaquin venkat los carbohidratos. Si consume muchos de estos nutrientes en kimberly comida, el azúcar en la joaquin aumentará con mayor lentitud de lo que lo haría de [place] u Una. · Limite las grasas saturadas, venkat las de las colton y los productos lácteos. Trate de reemplazarlos con grasa monoinsaturada venkat, por ejemplo, el aceite de [place]. Esta es kimberly opción más saludable porque las personas con diabetes tienen un riesgo superior al promedio de enfermedad cardíaca. De todos modos, use kimberly cantidad [place] de [place]. Kimberly cucharada de aceite de kilgore contiene 14 gramos de grasa y 120 calorías. · Hacer ejercicio disminuye el nivel de azúcar en la joaquin. Si usted se [place] dosis de [employer] de inyecciones o Dorann Ed bomba, puede usar kimberly dosis sally que si no hace ejercicio. Tenga en cuenta que los horarios son importantes. Si usted hace ejercicio 1 hora después de jad comido, es posible que morley cuerpo necesite menos insulina que si lo hace 3 horas después de la comida. Mida morley nivel de azúcar en la joaquin para saber cómo afecta el ejercicio morley necesidad de insulina. · Kelsey ejercicio la mayoría de los días de la [place]. [place] por lo menos 30 minutos al día. El ejercicio le ayuda a mantener un peso saludable y a que morley cuerpo use la insulina. Caminar es kimberly manera fácil de hacer ejercicio. Aumente en forma gradual la distancia que camina cada día. Quizás también desee nadar, montar en bicicleta o hacer otras actividades. Cuando come afuera · Aprenda a determinar los tamaños de las porciones de alimentos que contienen carbohidratos. Si mide paola alimentos en el hogar, será más fácil calcular la cantidad de carbohidratos en la porción del restaurante. · Si la comida que pide contiene demasiados carbohidratos (venkat joanne, maíz o frijoles horneados), pida en morley lugar nadiya comida baja en carbohidratos. Pida nadiya ensalada o vegetales verdes. · Si [place] insulina, mídase el nivel de azúcar antes y después de salir a comer afuera para saber la cantidad que puede comer en el futuro. · Si consume más carbohidratos de lo planeado en nadiya comida, camine o ruby ejercicio. [place] le ayudará a bajar el nivel de azúcar en la joaqiun. Dónde puede encontrar más información en inglés? Lucian Keturahr a [employer] Nevillee F[address] en la búsqueda para aprender más acerca de \"Consejos de nutrición para la diabetes: Después de la consulta. \"  
© 0275-1738 Healthwise, Incorporated. Instrucciones de cuidado adaptadas bajo licencia por Irais Khalil (which disclaims liability or warranty for this information). Estas instrucciones de cuidado son para usarlas con morley profesional clínico registrado. Si tiene preguntas acerca de nadiya afección médica o de estas instrucciones, pregunte siempre a morlye profesional de [employer]. Healthwise, Incorporated niega cualquier garantía o responsabilidad por morley uso de esta información. Versión del contenido: [id number]; Revisado: 6 agosto, 2013

## 2017-07-25 NOTE — ROUTINE PROCESS
TRANSFER - IN REPORT:    Verbal report received from Cheryle Schooner RN(name) on [place]  being received from ED(unit) for routine progression of care      Report consisted of patients Situation, Background, Assessment and   Recommendations(SBAR). Information from the following report(s) SBAR, Kardex, ED Summary, [facility] and Recent Results was reviewed with the receiving nurse. Opportunity for questions and clarification was provided. Assessment completed upon patients arrival to unit and care assumed.

## 2017-07-25 NOTE — DIABETES MGMT
GLYCEMIC CONTROL & NUTRITION:    - consult received, new DM diagnosis, most likely Type 2  - DM education initiate today per HOSP GRISEL[facility]TE[facility] RD & RN  - discussed with Dr. Peters, working to determine plan for successful regimen upon d/c, may be most successful on orals  - full GC consult note to follow     FELI Allen, MPH, RD, CDE

## 2017-07-25 NOTE — PROGRESS NOTES
conducted an initial consultation and Spiritual Assessment for Kendra Villalpando, who is a 46 y. o.,male. According to the patients chart Tenriism Affiliation is: Zoroastrian. The reason the Patient came to the hospital is:   Patient Active Problem List    Diagnosis Date Noted    Cellulitis 07/24/2017    Hyperglycemia due to type 2 diabetes mellitus ([facility] [address] 07/24/2017    Hyponatremia 07/24/2017        Initiated a relationship of care and support. Provided information about Spiritual Care Services. Offered prayer and assurance of continued prayers on patients behalf. Plan:  Chaplains will continue to follow and will provide pastoral care as needed or requested.  recommends bedside caregivers page  on duty if patient shows signs of acute spiritual or emotional distress. Father FELI [facility] - Office

## 2017-07-25 NOTE — PROGRESS NOTES
Readmission Risk Assessment: Low Risk and [facility] patients    RRAT Score: 1 - 12    Initial Assessment:Emergency Contact: chart reviewed pt admitted for cellulitis,c/o of nail going thru his shoe while at work,provided cm with work contact [phone] spoke with Mrs. Romero,will be faxing documents to cm,waiting on fax. Pertinent Medical Hx:see chart   PCP/Specialists: Community Services: non listed    DME:     Low Risk Care Transition Plan:  1. Evaluate for [facility] or [address] coordination of resources  2. Involve patient/caregiver in assessment, planning, education and implement of intervention. 3. CM daily patient care huddles/interdisciplinary rounds. 4. PCP/Specialist appointment within 7 - 10 days made prior to discharge. 5. Facilitate transportation and logistics for follow-up appointments. 6. Handoff to [address] Nurse Navigator or PCP practice.

## 2017-07-25 NOTE — DIABETES MGMT
Diabetes Patient/Family Education Record  -pt with no known documented h/o T2DM, uncontrolled aeb HgBA1C 11.2%  -pt is self pay and can speak and understand English fairly well, however unable to read [place]  -education provided to pt including overview of DM (written information provided in Thai), pt appeared to understand information provided  -pt asked  \"what should my blood sugars be\"  -GC will provide follow up information to pt including how CHO impact BG, medication education, hyper + hypo (written in Thai)    Factors That  May Influence Patients Ability  to Learn or  Comply With  Recommendations:    [x]   Language barrier    []   Cultural needs   []   Motivation    []   Cognitive limitation    []   Physical   []   Education    []   Physiological factors   []   Hearing/vision/speaking impairment   []   Gnosticism beliefs    [x]   Financial factors   []  Other:   []  No factors identified at this time.      Person Instructed:   [x]   Patient   []   Family   []  Other     Preference for Learning:   [x]   Verbal   [x]   Written (provided in [address])   []  Demonstration     Level of Comprehension & Competence:    []  Good                                      [x] Fair                                     []  Poor                             [x]  Needs Reinforcement   []  Teachback completed    Education Component:   [x]  Medication management, including plan to discharge on oral antihyperglycemic medications    []  Nutritional management including the role of carbohydrate intake   [x]  Exercise; pt interested in dancing as a form of exercise   []  Signs, symptoms, and treatment of hyperglycemia and hypoglycemia   [x] Treatment of hyperglycemia and hypoglycemia   [x]  Importance of blood glucose monitoring; pt  Provided with \"no code\" glucometer; pt will check BG for remainder of admission (discussed with patient)    []  Instruction on use of blood glucose meter   [x]  Discuss the importance of HbA1C monitoring and provide patient with  results   []  Sick day guidelines   []  Proper use and disposal of lancets, needles, syringes or insulin pens (if appropriate)   [x]  Potential long-term complications including the importance of good glycemic control for wound healing   [] Provide emergency contact number and contact number for more information    [x]  Goal:  Patient/family will demonstrate understanding of Diabetes Self Management Skills by: (date) 8/30/17  Plan for post-discharge education or self-management support:    [x] Outpatient class schedule provided            [] Patient Declined    [] Scheduled for outpatient classes (date) _______       Michelle Lemus RN, MS  Glycemic Control Team

## 2017-07-25 NOTE — PROGRESS NOTES
Patient was visited by AMARJIT. Volunteer followed up with patient and/or family and reported no needs to this . Chaplains will continue to follow and will provide pastoral care as needed or requested. Mariam Monique Intern  [phone] - Office

## 2017-07-25 NOTE — PROGRESS NOTES
0135  Patient A/OX4. Primary language is Belarusian. Patient able to speak some english. Wound noted to left foot. No drainage or foul odor noted to site. Denies pain. No c/o numbness or tingling sensation. Call light within reach. 1112  No acute distress noted. 1500  Patient resting in bed. Call light within reach. Bedside and Verbal shift change report given to MICHAEL LLOYD (oncoming nurse) by Deana Birmingham (offgoing nurse). Report included the following information SBAR and Kardex.

## 2017-07-25 NOTE — PROGRESS NOTES
Shift Summary- Pt admitted during the night. Wound to left foot and redness to top of left foot noted. No nausea or vomiting reported. Pt denies any pain. Pt missing left eye which was lost in an accident. Assessment and admission completed.       Patient Vitals for the past 12 hrs:   Temp Pulse Resp BP SpO2   07/24/17 2227 98.6 °F (37 °C) 94 18 114/50 96 %   07/24/17 2150 98.7 °F (37.1 °C) 89 18 121/73 99 %   07/24/17 2050 98.9 °F (37.2 °C) 93 20 116/71 100 %   07/24/17 1851 98.4 °F (36.9 °C) 99 18 122/61 97 %

## 2017-07-25 NOTE — ROUTINE PROCESS
Bedside shift change report given to Zeyad Land (oncoming nurse) by Jenny Roque RN (offgoing nurse). Report included the following information SBAR, Kardex, MAR and Recent Results.

## 2017-07-25 NOTE — PROGRESS NOTES
conducted an initial consultation and Spiritual Assessment for Sonu Gates, who is a 46 y. o.,male. Patients Primary Language is: Croatian. According to the patients EMR Synagogue Affiliation is: Zoroastrianism. The reason the Patient came to the hospital is:   Patient Active Problem List    Diagnosis Date Noted    Cellulitis 07/24/2017    Hyperglycemia due to type 2 diabetes mellitus ([facility] [address] 07/24/2017    Hyponatremia 07/24/2017        The  provided the following Interventions:  Initiated a relationship of care and support. Explored issues of glory, belief, spirituality and Voodoo/ritual needs while hospitalized. Provided chaplaincy education. Provided information about Spiritual Care Services. Offered assurance of continued prayers on patients behalf. Chart reviewed. The following outcomes were achieved:  Patient expressed gratitude for pastoral care visit. Assessment:  Patient does not have any Voodoo/cultural needs that will affect patients preferences in health care. There are no further spiritual or Voodoo issues which require intervention at this time. Plan:  Chaplains will continue to follow and will provide pastoral care on an as needed/requested basis.  recommends bedside caregivers page  on duty if patient shows signs of acute spiritual or emotional distress.       Sister Mesfin Morin, [place], [address] [phone]

## 2017-07-25 NOTE — PROGRESS NOTES
Bedside shift change report given to Elia Gómez RN (oncoming nurse) by Arsenio Saha   (offgoing nurse). Report included the following information SBAR.

## 2017-07-25 NOTE — PROGRESS NOTES
Daily Progress Note: 7/25/2017 10:11 AM   Admit Date: 7/24/2017    Patient seen in follow up for multiple medical problems as listed below:  Patient Active Problem List   Diagnosis Code    Cellulitis L03.90    Hyperglycemia due to type 2 diabetes mellitus ([facility] [address] E11.65    Hyponatremia E87.1       Assesment     46 y.o. male  Danish speaking, decent english speaking, with no PMHX presents from urgent care after his Left foot became increasingly red, swollen, and painful after stepping on a nail 3 days prior. He has had minimal medical care until this time. Tachycardic with leukocytosis 16.7 on arrival given IVF however lactic acid 1.2 . Diagnosed with cellulitis L foot with lymphadenitis no evidence of fluid or bone involvement on XR. Hyperglycemia 300 found to have DM A1c 11.2 DM consulting. 3rd-4th toes becoming increasingly swollen and discolored CT R foot ordered 7/25 may need podiatry       Cellulitis L foot s/p nail puncture with lymphadenitis  -erythema and lymphangitic streaking, interdigit skin breakdown WBC 16  -XR x1 no evidence of abscess or drainage/bone involvement, low threshold for CT  -levaquin and vanc 7/24 charli  -wound Cx x1    Diabetic foot infection  Swelling and discoloration at multiple toes and MTP in 3rd-4th MTP area  Need CT R foot, may need podiatry consult for I&D     Hyperglycemia with new onset DM-  A1c 11.2  on arival  Add lantus and premeal insulin + SSI + Ac/Hs coverage  DM education consult    DVT Protocol Active: yes  Code Status:  Full Code     Disposition: home 1-3 days pending clinical course    Subjective:     CC: Skin Problem    Interval History: erythema, pain, swelling improved. DM team to consult.      ROS: 11 point ROS negative     Objective:     Visit Vitals    /70 (BP 1 Location: Left arm, BP Patient Position: At rest)    Pulse 84    Temp 98.7 °F (37.1 °C)    Resp 18    Ht 5' 10\" (1.778 m)    Wt 86.2 kg (190 lb)    SpO2 98%    BMI 27.26 kg/m2 Temp (24hrs), Av.9 °F (37.2 °C), Min:98.4 °F (36.9 °C), Max:99.8 °F (37.7 °C)        Intake/Output Summary (Last 24 hours) at 17 1011  Last data filed at 17 0933   Gross per 24 hour   Intake          1168.33 ml   Output                0 ml   Net          1168.33 ml       Gen: AOx3, NAD  HEENT:  TIMOTEO, EOMI. Neck: No Bruits/JVD   Lungs:   CTAB. Good respiratory effort  Heart:   RR S1 S2 without M/R/G  Abdomen: ND,NT, BSX4,   Extremities:   No LE edema. No cyanosis.   L foot: with erythema on dorsum, discoloration/swelling 3rd/4th toes no drainage, questionable fluctuance  Skin:  no jaundice      Data Review:     Meds/Labs/Tests reviewed    Current Shift:  701 - 1900  In: 240 [P.O.:240]  Out: -   Last three shifts:  1901 -  07  In: 928.3 [I.V.:928.3]  Out: -   Recent Labs      17   0258  17   1650   WBC  15.4*  16.7*   RBC  4.06*  4.71   HGB  12.0*  14.1   HCT  34.4*  39.6   PLT  223  258   GRANS   --   82*   LYMPH   --   9*   EOS   --   1       Recent Labs      17   0258  17   1650   BUN  11  18   CREA  0.81  1.11   CA  8.2*  9.1   ALB   --   2.9*   K  3.5  4.3   NA  131*  127*   CL  100  91*   CO2  24  27   GLU  234*  303*        Lab Results   Component Value Date/Time    Glucose 234 2017 02:58 AM    Glucose 303 2017 04:50 PM          Care coordination with Nursing/Consultants/staff: 5  Prior history, labs, and charting reviewed: 15    Procedures/Imaging:  XR L foot   CT L foot     Total time spent with chart review, patient examination/education, discussion with staff on case,documentation and medication management / adjustment  :  30 Minutes      Dr Brandin Hernandez

## 2017-07-25 NOTE — ROUTINE PROCESS
TRANSFER - OUT REPORT:    Verbal report given to [facility] SHANON on Cynthia Erick  being transferred to [facility] for routine progression of care       Report consisted of patients Situation, Background, Assessment and   Recommendations(SBAR). Information from the following report(s) SBAR, Kardex and MAR was reviewed with the receiving nurse. Lines:   Peripheral IV 07/24/17 Right Antecubital (Active)   Site Assessment Clean, dry, & intact 7/24/2017  5:02 PM   Phlebitis Assessment 0 7/24/2017  5:02 PM   Infiltration Assessment 0 7/24/2017  5:02 PM   Dressing Status Clean, dry, & intact 7/24/2017  5:02 PM   Dressing Type Transparent 7/24/2017  5:02 PM   Hub Color/Line Status Pink;Patent; Flushed 7/24/2017  5:02 PM   Action Taken Blood drawn 7/24/2017  5:02 PM        Opportunity for questions and clarification was provided.       Patient transported with:   Skin Scan

## 2017-07-26 NOTE — ROUTINE PROCESS
Bedside shift change report given to Keshav De Dios RN (oncoming nurse) by Magui Hutchinson RN (offgoing nurse). Report included the following information SBAR, Kardex, MAR, Recent Results and Alarm Parameters .

## 2017-07-26 NOTE — PROGRESS NOTES
9950 [facility] report from [facility]. Patient a and O x $ c/o of moderate pain to left foot no medication required at this time. Will continue to monitor.     1040 Report and transfer of trust given to [employer]

## 2017-07-26 NOTE — DIABETES MGMT
GLYCEMIC CONTROL PROGRESS NOTE:    -pt recently diagnosed T2DM, uncontrolled HGbA1C 11.2%  -FBG progressing, PPG out of target range  TDD 47 (15 Lantus + 10 mealtime + 22 corrective coverage)  Recent Glucose Results:   Lab Results   Component Value Date/Time     (H) 07/26/2017 01:10 AM    GLUCPOC 209 (H) 07/26/2017 07:32 AM    GLUCPOC 246 (H) 07/25/2017 09:06 PM    GLUCPOC 225 (H) 07/25/2017 06:47 PM     Recommendation: increase mealtime insulin   *Humalog 8 units AC TIRANDY Garcia RN, MS  Glycemic Control Team

## 2017-07-26 NOTE — DIABETES MGMT
Diabetes Patient/Family Education Record  -pt with new dx of DM, most likely Type 2  -continuation of in depth education, pt provided with [place] language education materials on diet  -pt instructed & able to do a return demonstration with glucometer; pt will check BG for remainder of admission, nurse notified  -pt also supplied with additional lancets & strips  Addendum  -follow up with pt to assess comprehension of previously provided Japanese language ed materials   -pt able to read the materials aloud and explain what they mean including:  CHO containing foods, lean protein, how to balance a meal & how CHOs effect glycemic control  -pt also provided with ADA toll free number to answer questions regarding DM in Japanese    Factors That  May Influence Patients Ability  to Learn or  Comply With  Recommendations:    [x]   Language barrier; pt [place] speaking; limited [place]   []   Cultural needs   []   Motivation    []   Cognitive limitation    []   Physical   []   Education    []   Physiological factors   []   Hearing/vision/speaking impairment   []   Latter day beliefs    [x]   Financial factors; self pay   []  Other:   []  No factors identified at this time.      Person Instructed:   [x]   Patient   []   Family   []  Other     Preference for Learning:   [x]   Verbal   [x]   Written   [x]  Demonstration     Level of Comprehension & Competence:    []  Good                                      [x] Fair                                     []  Poor                             [x]  Needs Reinforcement   [x]  Teachback completed    Education Component:   []  Medication management, including how to administer insulin (if appropriate) and potential medication interactions    [x]  Nutritional management including the role of carbohydrate intake   []  Exercise   []  Signs, symptoms, and treatment of hyperglycemia and hypoglycemia   [] Treatment of hyperglycemia and hypoglycemia   [x]  Importance of blood glucose monitoring; pt instructed to SBGM at least once a day    [x]  Instruction on use of blood glucose meter   [x]  Discuss the importance of HbA1C monitoring and provide patient with  results   []  Sick day guidelines   []  Proper use and disposal of lancets, needles, syringes or insulin pens (if appropriate)   []  Potential long-term complications (retinopathy, kidney disease, neuropathy, heart disease, stroke, vascular disease, foot care)   [] Provide emergency contact number and contact number for more information    [x]  Goal:  Patient/family will demonstrate understanding of Diabetes Self Management Skills by: (date) 8/30 with [facility] [address] [place] [address] for post-discharge education or self-management support:    [] Outpatient class schedule provided            [] Patient Declined    [] Scheduled for outpatient classes (date) _______       Leidy Garcia RN, MS  Glycemic Control Team

## 2017-07-26 NOTE — PROGRESS NOTES
Shift Summary- Pt had an uneventful night. No changes during shift to report.        Patient Vitals for the past 12 hrs:   Temp Pulse Resp BP SpO2   07/26/17 0326 98.5 °F (36.9 °C) 78 18 100/49 100 %   07/25/17 2309 98.6 °F (37 °C) 96 18 132/70 96 %   07/25/17 2016 100.2 °F (37.9 °C) 98 18 121/66 98 %

## 2017-07-26 NOTE — PROGRESS NOTES
Hospitalist Progress Note    Patient: Yulissa Kidd MRN: [id number]  CSN: [id number]    YOB: 1965  Age: 46 y.o. Sex: male    DOA: 7/24/2017 LOS:  LOS: 2 days          Chief Complaint:    Skin Problem    Assessment/Plan     Hospital Problems  Never Reviewed          Codes Class Noted POA    * (Principal)Cellulitis ICD-10-CM: L03.90  ICD-9-CM: 682.9  7/24/2017 Unknown        Hyperglycemia due to type 2 diabetes mellitus ([facility] [address] ICD-10-CM: E11.65  ICD-9-CM: 250.00  7/24/2017 Unknown        Hyponatremia ICD-10-CM: E87.1  ICD-9-CM: 276.1  7/24/2017 Unknown              Yulissa Kidd is a 46 y.o. male who was admitted on 7/24/17 for left foot cellulitis after stepping on a nail 3 days prior to admission. He was noted to be tachycardic w/ WBC 16.7k and was given initial fluid challenge in ED. His A1c was noted to be 11.2% on admission which represents a new DM diagnosis for him.           Cellulitis (7/24/2017)  - WBC improving slowly: 14.8k this am from 15.4k yesterday am  - on IV vancomycin and levaquin  - CT of left foot on 7/25/17 w/ suspicion for osteomyelitis  - consult podiatry today      DM2   - Inpatient goal: Glucose 140-180mg/dL  - Latest glucose value: 209mg/dL @ 0732  - Total daily insulin dose (past 24h): 47 units  - Correctional dosing adjusted: 48 units  - Treatment regimen:   - Lantus: increase lantus to 24 units from 15 units qA   - Humalog: increase humalog to 8 units from 5 units [facility] + SSI coverage  - Recalculate daily  - Latest A1c: 11.2% on admission      Hyponatremia (7/24/2017)  - improving now at 133 mmol/L  - continue IV hydration w/ NS    PT/OT    CM for DC planning    FENGI: NS @ 100cc/hr; p-lvx; diabetic diet; no GI prophylaxis needed    Dispo: podiatry consult, continue inpatient      Subjective:    No new complaints    Review of systems:    Constitutional: denies fevers, chills, myalgias, diaphoresis  Respiratory: denies shortness of breath, cough, wheezing  Cardiovascular: denies chest pain, palpitations  Gastrointestinal: denies nausea, vomiting, diarrhea, constipation      Vital signs/Intake and Output:  Visit Vitals    /79 (BP 1 Location: Right arm, BP Patient Position: At rest)    Pulse 80    Temp 98.4 °F (36.9 °C)    Resp 18    Ht 5' 10\" (1.778 m)    Wt 86.2 kg (190 lb 0.1 oz)    SpO2 99%    BMI 27.26 kg/m2     Current Shift:     Last three shifts:  07/24 1901 - 07/26 0700  In: 3216.7 [P.O.:840;  I.V.:2376.7]  Out: 900 [Urine:900]    Exam:    General: Awake and alert, no acute distress, resting comfortably in bed  Head: Atraumatic, normocephalic  Eyes: PERRLA, EOMI, sclerae non-icteric  ENT: mucous membranes moist, palate elevates evenly, tongue midline  Neck: supple, no masses, no lymphadenopathy, no rigidity   CVS:Regular rate and rhythm, no murmurs, rubs, gallops, or clicks  Lungs:Clear to auscultation bilaterally, no wheezes, rales, or rhonchi  Abdomen: Soft, Nontender, nondistended, bowel sounds normal throughout  Extremities: left foot w/ dorsal erythema and dark discoloration over 2nd-4th toes; + cap refill all toes, barely palpable pulses left foot; streaking up left lower leg/calf; puncture wound noted pantar surface of left foot; otherwise atraumatic, pulses 1+ all other ext; strength 5/5 all ext  Skin: as above, otherwise warm, dry, well perfused, without rash; no cyanosis or clubbing  Neuro: CN II-XII grossly intact, no focal neurologic deficits  Psych: awake, alert, and oriented x 3; full range of mood and affect                Labs: Results:       Chemistry Recent Labs      07/26/17   0110  07/25/17   0258  07/24/17   1650   GLU  147*  234*  303*   NA  133*  131*  127*   K  3.6  3.5  4.3   CL  99*  100  91*   CO2  27  24  27   BUN  8  11  18   CREA  0.85  0.81  1.11   CA  8.4*  8.2*  9.1   AGAP  7  7  9   BUCR  9*  14  16   AP   --    --   99   TP   --    --   8.2   ALB   --    --   2.9*   GLOB   --    --   5.3*   AGRAT   -- --   0.5*      CBC w/Diff Recent Labs      07/26/17   0110  07/25/17   0258  07/24/17   1650   WBC  14.8*  15.4*  16.7*   RBC  4.29*  4.06*  4.71   HGB  12.6*  12.0*  14.1   HCT  36.3  34.4*  39.6   PLT  244  223  258   GRANS   --    --   82*   LYMPH   --    --   9*   EOS   --    --   1      Cardiac Enzymes No results for input(s): CPK, CKND1, KARY in the last 72 hours. No lab exists for component: CKRMB, TROIP   Coagulation No results for input(s): PTP, INR, APTT in the last 72 hours. No lab exists for component: INREXT    Lipid Panel No results found for: CHOL, CHOLPOCT, CHOLX, CHLST, CHOLV, 779650, HDL, LDL, LDLC, DLDLP, 075396, VLDLC, VLDL, TGLX, TRIGL, TRIGP, TGLPOCT, CHHD, CHHDX   BNP No results for input(s): BNPP in the last 72 hours.    Liver Enzymes Recent Labs      07/24/17   1650   TP  8.2   ALB  2.9*   AP  99   SGOT  12*      Thyroid Studies No results found for: T4, T3U, TSH, TSHEXT     Procedures/imaging: see electronic medical records for all procedures/Xrays and details which were not copied into this note but were reviewed prior to creation of [facility] 9293, DO

## 2017-07-26 NOTE — CONSULTS
Consultation Note    Assessment:     1. Cellulitis left foot and leg  2. DM with neurological manifestations  3. Puncture wound plantar left foot  4. Rule out vascular impairment left foot    Plan:     1. Order orthowedge shoe for offloading  2. Continue IV antibiotics as per hospitalist  3. Plan for debridement left foot   4. NPO after midnight for surgery tomorrow  5. Order stat arterial PVL    Subjective:     I was asked by Dr. Ankit Almendarez to evaluate Alissa Morrison for a puncture wound injury with resultant cellulitis left foot. He  is a 46 y.o. male admitted on 7/24/2017 for Cellulitis. The patient states that he has been getting better since being on IV antibiotics since 7/24/17. He notes that he stopped have chills and night sweats this morning. He notes being recently being diagnosed with diabetes. Prior to admission, he notes that he was unable to stand on his foot, but now he is able to bear weight. No Known Allergies    History reviewed. No pertinent past medical history. History reviewed. No pertinent surgical history. History reviewed. No pertinent family history. Constitutional: negative for fevers, chills, sweats, fatigue and malaise   Skin: postitve for redness of the left foot with darkened discoloration of the 2nd through 4th digits. Respiratory: The patient denies shortness of breath or difficulty breathing   Gastointestinal: No abdominal pain, constipation, diarrhea or nausea   Musculoskeletal: The patient denies pain in left foot and leg. Endo: Not assessed   Heme: Not assessed   Neurological: The patient's epicritic sensations are severely diminished in the left foot; sharp/dull, light touch, 2-point discrimination, crude touch.          Vitals:    07/26/17 0326 07/26/17 0749 07/26/17 1104 07/26/17 1551   BP: 100/49 121/79 147/79 144/70   Pulse: 78 80 86 88   Resp: 18 18 20 20   Temp: 98.5 °F (36.9 °C) 98.4 °F (36.9 °C) 98.5 °F (36.9 °C) (!) 101.1 °F (38.4 °C)   SpO2: 100% 99% 100% 97% Weight:       Height:         General Appearance: The patient is alert and orient. The patient is lying down in the bed. Extremities:   No pulses left foot. Erythema dorsal and lateral foot up to the ankle, no warmth. Decreased erythema based upon previous markings of the cellulitis at the level of the leg. Neuro: Sharp/dull, 2 point discrimination, light touch, and crude touch are decreased in stocking and glove distribution. Recent Labs      07/26/17   0110   WBC  14.8*   HCT  36.3   MCV  84.6     Recent Labs      07/26/17   0110   NA  133*   CO2  27   BUN  8     Recent Labs      07/24/17   1650   AGRAT  0.5*         No results for input(s): SEDRATE in the last 72 hours.        7/26/2017 12:04 PM - [place], Lab In [facility]       Component Results      Component Value Flag Ref Range Units Status     Special Requests: NO SPECIAL REQUESTS     Final     GRAM STAIN RARE WBC'S     Final     GRAM STAIN      Final     FEW GRAM POSITIVE COCCI IN PAIRS     Culture result:  (A)    Final     MODERATE STAPHYLOCOCCUS SPECIES, COAGULASE NEGATIVE     Culture result:  (A)    Final     MODERATE STREPTOCOCCUS VIRIDANS     Culture result: MANY DIPHTHEROIDS (A)          Imaging: Plain films 7/24/17    IMPRESSION:      No acute osseous abnormality demonstrated. No radiopaque foreign body identified        CT scan 7/26/17       FINDINGS:     BONES: There are no acute fractures. There is subtle erosion along the dorsal  aspect of the third proximal phalanx and there is also subtle erosion along the  plantar aspect of the third metatarsal head suspicious for osteomyelitis.     Soft tissue windows demonstrate stranding in the subcutaneous soft tissues of  the foot dorsally. There is deep soft tissue gas along the plantar aspect of the  foot at the level of the second and third metatarsal bones as well as around the  third and fourth toes. No focal fluid collection seen to suggest an abscess.   There is an ulcer along the dorsal aspect of the third toe.     OTHER: There are erosions of the third metatarsal head along the plantar aspect  and along the dorsal aspect of the third proximal phalanx suspicious for  osteomyelitis.     There is deep soft tissue gas as described with an ulcer along the dorsal aspect  of the third toe. There is deep and superficial cellulitis.  No definite fluid  collection seen to suggest an abscess.     No acute fractures or subluxation.     _______________  Eder Hernandez DPM

## 2017-07-26 NOTE — PROGRESS NOTES
Pt currently with a podiatry consult. Awaiting recommendation from podiatry to assist with determining plan of care. Pt Noted pt may require surgical intervention and/or IVABX. CM to continue to follow and assist with plan of care needs. Care Management Interventions  Mode of Transport at Discharge: Other (see comment) (Friend)  Transition of Care Consult (CM Consult): Discharge Planning  Health Maintenance Reviewed: Yes  Current Support Network: Other  Confirm Follow Up Transport: Self  Plan discussed with Pt/Family/Caregiver:  Yes

## 2017-07-27 NOTE — ROUTINE PROCESS
TRANSFER - OUT REPORT:    Verbal report given to MICHAEL Hermosillo(name) on [place]  being transferred to OR/Holding(unit) for ordered procedure       Report consisted of patients Situation, Background, Assessment and   Recommendations(SBAR). Information from the following report(s) SBAR, Intake/Output and MAR was reviewed with the receiving nurse. Lines:   Peripheral IV 07/24/17 Right Antecubital (Active)   Site Assessment Clean, dry, & intact 7/27/2017  8:36 AM   Phlebitis Assessment 0 7/27/2017  8:36 AM   Infiltration Assessment 0 7/27/2017  8:36 AM   Dressing Status Clean, dry, & intact 7/27/2017  8:36 AM   Dressing Type Transparent;Tape 7/27/2017  8:36 AM   Hub Color/Line Status Patent;Pink 7/27/2017  8:36 AM   Action Taken Open ports on tubing capped 7/26/2017  8:40 PM   Alcohol Cap Used Yes 7/27/2017  8:36 AM       Peripheral IV 07/26/17 Right Forearm (Active)   Site Assessment Clean, dry, & intact 7/27/2017  8:36 AM   Phlebitis Assessment 0 7/27/2017  8:36 AM   Infiltration Assessment 0 7/27/2017  8:36 AM   Dressing Status Clean, dry, & intact 7/27/2017  8:36 AM   Dressing Type Transparent;Tape 7/27/2017  8:36 AM   Hub Color/Line Status Blue;Patent 7/27/2017  8:36 AM   Action Taken Open ports on tubing capped 7/26/2017  8:40 PM   Alcohol Cap Used Yes 7/27/2017  8:36 AM        Opportunity for questions and clarification was provided.       Patient transported with:   [employer]

## 2017-07-27 NOTE — PERIOP NOTES
TRANSFER - IN REPORT:    Verbal report received from Krzysztof Leung RN and Tamiko Gibbons CRNA on [facility]  being received from OR for routine post - op      Report consisted of patients Situation, Background, Assessment and   Recommendations(SBAR). Information from the following report(s) SBAR, Kardex, OR Summary, Procedure Summary, Intake/Output, MAR, Recent Results and Med Rec Status was reviewed with the receiving nurse. Opportunity for questions and clarification was provided. Assessment completed upon patients arrival to unit and care assumed.

## 2017-07-27 NOTE — PROGRESS NOTES
I recommend a vascular consult due to low RAFAEL left with digital cyanosis. The patient is status post I&D and debridement left foot.      Right Leg  Doppler:    Normal  Ankle/Brachial: 0.81     Left Leg  Doppler:    Abnormal  Ankle/Brachial: 0.66    Thank you,    Genia Mon DPM

## 2017-07-27 NOTE — PROGRESS NOTES
The patient is seen in the pre-op holding area. We discussed his left surgery. I indicated that we may have to perform a transmetatarsal amputation at this visit as a worse case scenario. The plan is to perform an aggressive foot debridement. The patient's allergies were reviewed. Results for Jen Osborn (MRN [id number]) as of 7/27/2017 17:21   Ref. Range 7/27/2017 02:57   WBC Latest Ref Range: 4.6 - 13.2 K/uL 16.8 (H)   RBC Latest Ref Range: 4.70 - 5.50 M/uL 4.18 (L)   HGB Latest Ref Range: 13.0 - 16.0 g/dL 12.3 (L)   HCT Latest Ref Range: 36.0 - 48.0 % 34.9 (L)   MCV Latest Ref Range: 74.0 - 97.0 FL 83.5   MCH Latest Ref Range: 24.0 - 34.0 PG 29.4   MCHC Latest Ref Range: 31.0 - 37.0 g/dL 35.2   RDW Latest Ref Range: 11.6 - 14.5 % 13.0   PLATELET Latest Ref Range: 135 - 420 K/uL 251   MPV Latest Ref Range: 9.2 - 11.8 FL 10.0     Left Leg  Doppler:    Abnormal  Ankle/Brachial: 0.66     INTERPRETATION/FINDINGS  Physiologic testing was performed using continuous wave Doppler and  segmental pressures. 1. Moderate peripheral arterial disease indicated at rest in the right   leg. 2. Moderate peripheral arterial disease indicated at rest in the left  leg. 3. The right ankle/brachial index is 0.81 and the left ankle/brachial  index is 0.66.     ADDITIONAL COMMENTS     I have personally reviewed the data relevant to the interpretation of  this  study.     TECHNOLOGIST: Evelyn Dominguez. Wendy GAR, RVT  Signed: 07/27/2017 12:26 AM     PHYSICIAN: Terri Moreno. Nimo Barton MD  Signed: 07/27/2017 03:15 PM    A: PAD bilateral LE, Puncture wound left foot with resultant cellulitis left  P: The patient has been NPO since midnight. Plan for I&D, debridement left foot. The patient agrees to move forward with the procedure.      Lashae Waller DPM

## 2017-07-27 NOTE — PERIOP NOTES
Dr. Lizette Figueroa at bedside explaining patient's health status to patient. Informed patient that patient has poor circulation according to test performed last night and vascular team will be needed to reassess patient.

## 2017-07-27 NOTE — PROGRESS NOTES
No fax information received regarding [employer],cm call number provided by patient to follow [phone], left voice message with return contact,number provided does not provide company name,patient does not recall name of company.

## 2017-07-27 NOTE — PROGRESS NOTES
Shift summary: Pt A&Ox4, up ad carter, Pt sent to vascular for Ankle Brachial Index. Pt slept through most of the shift. Appears to be resting comfortably. No visible signs of distress. Pt remained NPO. CHG wipes completed. 2030: Acknowledged Stat order for Ankle Brachial Index. Attempted to contact the on-call for vascular- was informed that there was not an on call person for vascular until 2300.   2300: Notified Vascular tech of the stat order. 2345: Pt down to vascular for Ankle Brachial Index.

## 2017-07-27 NOTE — ROUTINE PROCESS
Bedside and Verbal shift change report given to FRANK [facility] (oncoming nurse) by WILLIE Abad RN (offgoing nurse). Report included the following information SBAR, Kardex, Intake/Output and MAR.

## 2017-07-27 NOTE — PROGRESS NOTES
Hospitalist Progress Note    Patient: Sonu Gates MRN: [id number]  CSN: [id number]    YOB: 1965  Age: 46 y.o. Sex: male    DOA: 7/24/2017 LOS:  LOS: 3 days          Chief Complaint:    Skin Problem    Assessment/Plan     Hospital Problems  Never Reviewed          Codes Class Noted POA    * (Principal)Cellulitis ICD-10-CM: L03.90  ICD-9-CM: 682.9  7/24/2017 Unknown        Hypergly[facility] due to type 2 [facility] ICD-10-CM: E11.65  ICD-9-CM: 250.00  7/24/2017 Unknown        Hyponatremia ICD-10-CM: E87.1  ICD-9-CM: 276.1  7/24/2017 Unknown              Sonu Gates is a 46 y.o. male who was admitted on 7/24/17 for left foot cellulitis after stepping on a nail 3 days prior to admission. He was noted to be tachycardic w/ WBC 16.7k and was given initial fluid challenge in ED. His A1c was noted to be 11.2% on admission which represents a new DM diagnosis for him.           Cellulitis (7/24/2017)  - WBC improving slowly: 14.8k this am from 15.4k yesterday am  - on IV vancomycin and levaquin  - CT of left foot on 7/25/17 w/ suspicion for osteomyelitis  - podiatry w/ plans to debride today      DM2   - Inpatient goal: Glucose 140-180mg/dL  - improved, continue current regimen  - recalculate tomorrow  - Latest A1c: 11.2% on admission      Hyponatremia (7/24/2017)  - improving now at 133 mmol/L  - continue IV hydration w/ NS    PT/OT    CM for DC planning    FENGI: NS @ 100cc/hr; p-lvx; diabetic diet; no GI prophylaxis needed    Dispo: debridement today, continue inpatient      Subjective:    No new complaints    Review of systems:    Constitutional: denies fevers, chills, myalgias, diaphoresis  Respiratory: denies shortness of breath, cough, wheezing  Cardiovascular: denies chest pain, palpitations  Gastrointestinal: denies nausea, vomiting, diarrhea, constipation      Vital signs/Intake and Output:  Visit Vitals    /66 (BP 1 Location: Right arm, BP Patient Position: At rest)    Pulse 96    Temp 98.9 °F (37.2 °C)    Resp 20    Ht 5' 10\" (1.778 m)    Wt 85.8 kg (189 lb 3.2 oz)    SpO2 100%    BMI 27.15 kg/m2     Current Shift:     Last three shifts:  07/25 1901 - 07/27 0700  In: 2320 [P.O.:480; I.V.:1840]  Out: 300 [Urine:300]    Exam:    General: Awake and alert, no acute distress, resting comfortably in bed  Head: Atraumatic, normocephalic  Eyes: PERRLA, EOMI, sclerae non-icteric  ENT: mucous membranes moist, palate elevates evenly, tongue midline  Neck: supple, no masses, no lymphadenopathy, no rigidity   CVS:Regular rate and rhythm, no murmurs, rubs, gallops, or clicks  Lungs:Clear to auscultation bilaterally, no wheezes, rales, or rhonchi  Abdomen: Soft, Nontender, nondistended, bowel sounds normal throughout  Extremities: left foot w/ dorsal erythema and dark discoloration over 2nd-4th toes; + cap refill all toes, barely palpable pulses left foot; streaking up left lower leg/calf; puncture wound noted pantar surface of left foot; otherwise atraumatic, pulses 1+ all other ext; strength 5/5 all ext  Skin: as above, otherwise warm, dry, well perfused, without rash; no cyanosis or clubbing  Neuro: CN II-XII grossly intact, no focal neurologic deficits  Psych: awake, alert, and oriented x 3; full range of mood and affect                Labs: Results:       Chemistry Recent Labs      07/27/17   0257  07/26/17   0110  07/25/17   0258  07/24/17   1650   GLU  174*  147*  234*  303*   NA  133*  133*  131*  127*   K  3.6  3.6  3.5  4.3   CL  97*  99*  100  91*   CO2  24  27  24  27   BUN  7  8  11  18   CREA  0.86  0.85  0.81  1.11   CA  8.4*  8.4*  8.2*  9.1   AGAP  12  7  7  9   BUCR  8*  9*  14  16   AP   --    --    --   99   TP   --    --    --   8.2   ALB   --    --    --   2.9*   GLOB   --    --    --   5.3*   AGRAT   --    --    --   0.5*      CBC w/Diff Recent Labs      07/27/17   0257  07/26/17   0110  07/25/17   0258  07/24/17   1650   WBC  16.8*  14.8*  15.4*  16.7*   RBC  4.18* 4.29*  4.06*  4.71   HGB  12.3*  12.6*  12.0*  14.1   HCT  34.9*  36.3  34.4*  39.6   PLT  251  244  223  258   GRANS   --    --    --   82*   LYMPH   --    --    --   9*   EOS   --    --    --   1      Cardiac Enzymes No results for input(s): CPK, CKND1, KARY in the last 72 hours. No lab exists for component: CKRMB, TROIP   Coagulation No results for input(s): PTP, INR, APTT in the last 72 hours. No lab exists for component: INREXT, INREXT    Lipid Panel No results found for: CHOL, CHOLPOCT, CHOLX, CHLST, CHOLV, 807389, HDL, LDL, LDLC, DLDLP, 448544, VLDLC, VLDL, TGLX, TRIGL, TRIGP, TGLPOCT, CHHD, CHHDX   BNP No results for input(s): BNPP in the last 72 hours.    Liver Enzymes Recent Labs      07/24/17   1650   TP  8.2   ALB  2.9*   AP  99   SGOT  12*      Thyroid Studies No results found for: T4, T3U, TSH, TSHEXT, TSHEXT     Procedures/imaging: see electronic medical records for all procedures/Xrays and details which were not copied into this note but were reviewed prior to creation of Amy [id number], DO

## 2017-07-27 NOTE — ANESTHESIA PREPROCEDURE EVALUATION
Anesthetic History   No history of anesthetic complications            Review of Systems / Medical History  Patient summary reviewed, nursing notes reviewed and pertinent labs reviewed    Pulmonary  Within defined limits                 Neuro/Psych   Within defined limits           Cardiovascular  Within defined limits                Exercise tolerance: >4 METS     GI/Hepatic/Renal  Within defined limits              Endo/Other    Diabetes: well controlled         Other Findings              Physical Exam    Airway  Mallampati: II  TM Distance: 4 - 6 cm  Neck ROM: normal range of motion   Mouth opening: Normal     Cardiovascular  Regular rate and rhythm,  S1 and S2 normal,  no murmur, click, rub, or gallop             Dental  No notable dental hx       Pulmonary  Breath sounds clear to auscultation               Abdominal  GI exam deferred       Other Findings            Anesthetic Plan    ASA: 3  Anesthesia type: general          Induction: Intravenous  Anesthetic plan and risks discussed with: Patient

## 2017-07-27 NOTE — PROGRESS NOTES
Cm visited with patient,informed his that information thru fax has not been received yet,at this time pt does not have any other contact information to give cm or name of company he works for,cm will continue calling number initially given regarding job injury [phone].

## 2017-07-27 NOTE — PERIOP NOTES
TRANSFER - OUT REPORT:    Verbal report given to Ms. Taran RODRIGUEZ on Angie  being transferred to [place] for routine progression of care       Report consisted of patients Situation, Background, Assessment and   Recommendations(SBAR). Information from the following report(s) SBAR, Kardex, OR Summary, Procedure Summary, Intake/Output, MAR, Recent Results and Med Rec Status was reviewed with the receiving nurse. Lines:   Peripheral IV 07/26/17 Right Forearm (Active)   Site Assessment Clean, dry, & intact 7/27/2017  6:02 PM   Phlebitis Assessment 0 7/27/2017  6:02 PM   Infiltration Assessment 0 7/27/2017  6:02 PM   Dressing Status Clean, dry, & intact 7/27/2017  6:02 PM   Dressing Type Transparent 7/27/2017  6:02 PM   Hub Color/Line Status Blue;Patent 7/27/2017  8:36 AM   Action Taken Open ports on tubing capped 7/26/2017  8:40 PM   Alcohol Cap Used Yes 7/27/2017  8:36 AM       Peripheral IV 07/27/17 Left Hand (Active)   Site Assessment Clean, dry, & intact 7/27/2017  6:02 PM   Phlebitis Assessment 0 7/27/2017  6:02 PM   Infiltration Assessment 0 7/27/2017  6:02 PM   Dressing Status Clean, dry, & intact 7/27/2017  6:02 PM   Dressing Type Transparent 7/27/2017  6:02 PM   Hub Color/Line Status Patent 7/27/2017  6:02 PM        Opportunity for questions and clarification was provided.       Patient transported with:   O2 @ 2 liters  Registered Nurse  [facility]

## 2017-07-27 NOTE — ROUTINE PROCESS
Bedside shift change report given to Lisa Richardson. (oncoming nurse) by Ed Mendez   (offgoing nurse).  Report included the following information SBAR.   '

## 2017-07-27 NOTE — PROGRESS NOTES
Patient alert and awake with no c/o pain or distress. Assessment complete. Call light in reach, safety and comfort measures are in place.

## 2017-07-27 NOTE — BRIEF OP NOTE
BRIEF OPERATIVE NOTE    Date of Procedure: 7/27/2017   Preoperative Diagnosis: CELLULITIS OF LEFT FOOT, DM with neurological manifestations, PAD LE. Postoperative Diagnosis: Abscess left foot and cellulitis left. Procedure(s):  LEFT FOOT DEBRIDEMENT WITH C-ARM  Surgeon(s) and Role:     * Elke Montanez DPM - Primary         Assistant Staff:       Surgical Staff:  Circ-1: Annel Ac RN  Surg Asst-1: Marissa Kong Staff: Jc Jacobs  Event Time In   Incision Start 5230   Incision Close 1812     Anesthesia: General   Estimated Blood Loss: < 30 cc's. Specimens:   ID Type Source Tests Collected by Time Destination   1 : LEFT 2CD, 3RD, 4TH, TOE DRAINAGE Wound  CULTURE, ANAEROBIC, CULTURE, WOUND W GRAM STAIN, CULTURE & SMEAR, [employer] Tom Mancuso DPM 7/27/2017 1750 Microbiology      Findings: Dusky digits 2-4 left, focal erythema with flocculent dorsal 3rd interspace, mushiness plantar 3rd interspace left. Purulent foul odor drainage, approximately 10 cc's, dorsal left foot. Gangrenous tissue plantar 3rd interspace. All tendons were intact and no probing to bone. Complications: none  Implants: * No implants in log *   Drains:  The wound was packed with iodoform gauze left dorsal and plantar foot

## 2017-07-27 NOTE — PROGRESS NOTES
Patient was visited by AMARJIT. Volunteer followed up with patient and/or family and reported no needs to this . Chaplains will continue to follow and will provide pastoral care as needed or requested. Rev.  [address] [facility]  [phone]

## 2017-07-27 NOTE — PERIOP NOTES
[facility] called, and notified to bring patient to holding area. Patient scheduled for 1700 and has not yet arrived.

## 2017-07-27 NOTE — DIABETES MGMT
NUTRITION ASSESSMENT / [facility] PLAN OF CARE     Karlene Roof           46 y.o.              Patient Active Problem List   Diagnosis Code    Cellulitis L03.90    Hyperglycemia due to type 2 diabetes mellitus ([facility] E11.65    Hyponatremia E87.1        INTERVENTIONS/PLAN:   - consult received r/t new-onset DM, most likely Type 2  - basal/bolus insulin regimen in place, adjusted yesterday, will continue to monitor trends & make recommendations prn  - noted pt scheduled for wound debridement today, insulin needs may fluctuate r/t to this  - in depth DM education provided per [facility] RD & RN, see in depth notes  - pt provided with glucometer (Agamatrix) + 110 strips, 76 lancets and lancing device  - at this point [facility] team goal is to help optimize glycemic control during IP stay and continue to work with hospitalist to determine plan for successful regimen upon d/c, may be most successful on orals  - encouraged OP DM Self Management Class (schedule given)  - noted pt is self-pay and will need help finding OP care to follow-up with, [facility] schedule would be helpful    ASSESSMENT:   Nutritional Status: overweight (BMI 27%), BG out of target range r/t new-onset DM, increased nutritional needs r/t healing (foot puncture wound), food/nutrition/disease knowledge deficit       Diabetes Management:     - TDD: 69 units (24 Lantus, 45 Humalog - 22 mealtime, 23 corrective)  - 36 hr BG range: 147-240 mg/dl  - hypo: none    Lab Results   Component Value Date/Time     (H) 07/27/2017 02:57 AM    GLUCPOC 217 (H) 07/27/2017 07:57 AM    GLUCPOC 240 (H) 07/26/2017 09:54 PM    GLUCPOC 204 (H) 07/26/2017 04:25 PM           Within target range (non-ICU: <140; ICU<180): [] Yes   [x]  No    Current Insulin regimen:   - Lantus 24 units every day  - Humalog 8 units qac  - Humalog Normal Insulin Sensitivity Corrective Coverage    Home medication/insulin regimen:   - none, new dx     HbA1c: equivalent  to ave BGlucose of 275 mg/dl for 2-3 months prior to admission    Lab Results   Component Value Date/Time    Hemoglobin A1c 11.2 07/24/2017 04:50 PM       Adequate glycemic control PTA:  [] Yes  [x] No       SUBJECTIVE/OBJECTIVE:   Information obtained from: pt,chart, IDT: pt reports no previous dx of DM, very eager to learn and participating in education, reports lives in a motel          Medications: [x]                Reviewed        Labs:   Lab Results   Component Value Date/Time    Sodium 133 07/27/2017 02:57 AM    Potassium 3.6 07/27/2017 02:57 AM    Chloride 97 07/27/2017 02:57 AM    CO2 24 07/27/2017 02:57 AM    Anion gap 12 07/27/2017 02:57 AM    Glucose 174 07/27/2017 02:57 AM    BUN 7 07/27/2017 02:57 AM    Creatinine 0.86 07/27/2017 02:57 AM    Calcium 8.4 07/27/2017 02:57 AM    Albumin 2.9 07/24/2017 04:50 PM       Anthropometrics: IBW : 82.6 kg (182 lb), % IBW (Calculated): 103.96 %, BMI (calculated): 27.1  Wt Readings from Last 1 Encounters:   07/27/17 85.8 kg (189 lb 3.2 oz)    Ht Readings from Last 1 Encounters:   07/27/17 5' 10\" (1.778 m)       Estimated Nutrition Needs: 2050 Kcals/day (25 kcal/kg of IBW), Protein (g): 98 g (1.2 g/kg)    Based on:   []          Actual BW    [x]          IBW   []            Adjusted BW        Diet: Consistent CHO, NPO for procedure today  Active Orders   Diet    DIET NPO Past Midnight       Intake:   Patient Vitals for the past 100 hrs:   % Diet Eaten   07/26/17 0937 100 %   07/25/17 2016 98 %   07/25/17 1330 100 %   07/25/17 0933 100 %         Nutrition Diagnoses:   1. Nutrition Diagnosis 1: altered nutrition-related lab values Related to: DM Nutrition Diagnosis 1 Evidenced By: dx of new-onset DM    2. Nutrition Diagnosis 2: Food and nutrition-related knowledge deficit Nutrition Diagnosis 2 Related to: Dm Nutrition Diagnosis 2 Evidenced By: dx of new-onset DM and no previous education    3.  Nutrition Diagnosis 3: Overweight/obesity Nutrition Diagnosis 3 Related to: h/o excessive energy intake Nutrition Diagnosis 3 Evidenced By: BMI of 27%    Nutrition Interventions:   Intervention :Food/Nutrient Delivery: Yes  Meals/Snacks: General/healthful diet (COnsistent CHO)  Intervention: Nutrition Education: Yes  Intervention: Nutrition Counseling: Yes  Intervention: Coordination of Nutrition Care: Yes  Recommended Diet/Supplements: Continue current diet    Goal:    - BG will be within target range of  mg/dl by 7/31  - pt will demonstrate ability to identify CHO containing foods by 7/31  - pt will demonstrate ability to SMBG by 7/31  - pt will follow-up with OP MD re: Dm     Nutrition Monitoring and Evaluation      [x]     Monitor po intake on meal rounds  [x]     Continue inpatient monitoring and intervention  []     Other:      Nutrition Risk:  []   High     []  Moderate    [x]  Minimal/Uncompromised    FELI Hui, MPH, RD, CDE

## 2017-07-27 NOTE — ANESTHESIA POSTPROCEDURE EVALUATION
Post-Anesthesia Evaluation and Assessment    Cardiovascular Function/Vital Signs  Visit Vitals    /70    Pulse 93    Temp 37.1 °C (98.8 °F)    Resp 9    Ht 5' 10\" (1.778 m)    Wt 85.7 kg (189 lb)    SpO2 (P) 98%    BMI 27.12 kg/m2       Patient is status post Procedure(s):  LEFT FOOT DEBRIDEMENT WITH C-ARM. Nausea/Vomiting: Controlled. Postoperative hydration reviewed and adequate. Pain:  Pain Scale 1: (P) Numeric (0 - 10) (07/27/17 1846)  Pain Intensity 1: (P) 0 (07/27/17 1846)   Managed. Neurological Status:   Neuro (WDL): (P) Exceptions to WDL (07/27/17 1846)   At baseline. Mental Status and Level of Consciousness: Arousable. Pulmonary Status:   O2 Device: (P) Nasal cannula (07/27/17 1846)   Adequate oxygenation and airway patent. Complications related to anesthesia: None    Post-anesthesia assessment completed. No concerns. Patient has met all discharge requirements.     Signed By: Ronnell Harp CRNA    July 27, 2017

## 2017-07-28 NOTE — PROGRESS NOTES
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 Maggie Lebron that vascular assessment is needed for a proper prognosis of the patient's left foot condition. At a minimum, amputation of the 3rd toe will be required  Local wound care for now. Awaiting vascular assessment and recommendations. Weight only as necessary and in an orthowedge shoe.      Subjective:     Current Facility-Administered Medications   Medication Dose Route Frequency    [START ON 7/29/2017] insulin glargine (LANTUS) injection 28 Units  28 Units SubCUTAneous DAILY    insulin lispro (HUMALOG) injection 8 Units  8 Units SubCUTAneous TIDAC    vancomycin (VANCOCIN) 1,250 mg in 0.9% sodium chloride 250 mL IVPB  1,250 mg IntraVENous Q8H    insulin lispro (HUMALOG) injection   SubCUTAneous AC&HS    levoFLOXacin (LEVAQUIN) 750 mg in D5W IVPB  750 mg IntraVENous Q24H    0.9% sodium chloride infusion  100 mL/hr IntraVENous CONTINUOUS    acetaminophen (TYLENOL) solution 650 mg  650 mg Oral Q6H PRN    HYDROcodone-acetaminophen (NORCO) 5-325 mg per tablet 1 Tab  1 Tab Oral Q4H PRN    ondansetron (ZOFRAN ODT) tablet 4 mg  4 mg Oral Q6H PRN    enoxaparin (LOVENOX) injection 40 mg  40 mg SubCUTAneous Q24H    Vancomycin - pharmacokinetic consult  1 Each Other PRN    glucose chewable tablet 16 g  4 Tab Oral PRN         Objective:     Visit Vitals    /76 (BP 1 Location: Right arm, BP Patient Position: At rest)    Pulse 84    Temp 98.4 °F (36.9 °C)    Resp 20    Ht 5' 10\" (1.778 m)    Wt 85.7 kg (189 lb)    SpO2 100%    BMI 27.12 kg/m2       General Appearance:  alert, well developed, cooperative, pleasant and in no apparent distress   Extremities:  Decreased edema and erythema of the left foot. Increased perfusion in the 2nd and 4th toe left foot, but increased duskiness with capillary refill to the 3rd toe left foot. No odor, no active drainage. Neuro: All epicritic sensation are decreased in the feet bilaterally and symmetrically.      Recent Results (from the past 24 hour(s))   CULTURE, ANAEROBIC    Collection Time: 07/27/17  6:00 PM   Result Value Ref Range    Special Requests: NO SPECIAL REQUESTS      Culture result: CULTURE IN PROGRESS,FURTHER UPDATES TO FOLLOW     CULTURE, BODY FLUID W GRAM STAIN    Collection Time: 07/27/17  6:00 PM   Result Value Ref Range    Special Requests: NO SPECIAL REQUESTS      GRAM STAIN NO ORGANISMS SEEN      GRAM STAIN NO WBC'S SEEN      Culture result: CULTURE IN PROGRESS,FURTHER UPDATES TO FOLLOW     GLUCOSE, POC    Collection Time: 07/27/17  6:24 PM   Result Value Ref Range    Glucose (POC) 86 70 - 110 mg/dL   GLUCOSE, POC    Collection Time: 07/27/17  9:12 PM   Result Value Ref Range    Glucose (POC) 183 (H) 70 - 431 mg/dL   METABOLIC PANEL, BASIC    Collection Time: 07/28/17  2:49 AM   Result Value Ref Range    Sodium 132 (L) 136 - 145 mmol/L    Potassium 3.6 3.5 - 5.5 mmol/L    Chloride 98 (L) 100 - 108 mmol/L    CO2 28 21 - 32 mmol/L    Anion gap 6 3.0 - 18 mmol/L    Glucose 192 (H) 74 - 99 mg/dL    BUN 8 7.0 - 18 MG/DL    Creatinine 0.94 0.6 - 1.3 MG/DL    BUN/Creatinine ratio 9 (L) 12 - 20      GFR est AA >60 >60 ml/min/1.73m2    GFR est non-AA >60 >60 ml/min/1.73m2    Calcium 8.3 (L) 8.5 - 10.1 MG/DL   CBC W/O DIFF    Collection Time: 07/28/17  2:49 AM   Result Value Ref Range    WBC 13.1 4.6 - 13.2 K/uL    RBC 3.93 (L) 4.70 - 5.50 M/uL    HGB 11.7 (L) 13.0 - 16.0 g/dL    HCT 33.1 (L) 36.0 - 48.0 %    MCV 84.2 74.0 - 97.0 FL    MCH 29.8 24.0 - 34.0 PG    MCHC 35.3 31.0 - 37.0 g/dL    RDW 12.9 11.6 - 14.5 %    PLATELET 189 693 - 997 K/uL    MPV 9.5 9.2 - 11.8 FL   GLUCOSE, POC    Collection Time: 07/28/17  8:22 AM   Result Value Ref Range    Glucose (POC) 174 (H) 70 - 110 mg/dL   GLUCOSE, POC    Collection Time: 07/28/17 11:22 AM   Result Value Ref Range    Glucose (POC) 251 (H) 70 - 110 mg/dL   GLUCOSE, POC    Collection Time: 07/28/17  4:42 PM   Result Value Ref Range    Glucose (POC) 153 (H) 70 - 110 mg/dL         Imaging: No new images    Bassam Macedo DPM

## 2017-07-28 NOTE — ROUTINE PROCESS
Bedside and Verbal shift change report given to Nanci SALDIVAR RN (oncoming nurse) by Cher Moon   (offgoing nurse). Report included the following information SBAR, Intake/Output and MAR.

## 2017-07-28 NOTE — PROGRESS NOTES
Shift Summary:  Pt had uneventful shift and tolerated medications well  Changed dressing to left foot, pt tolerated well with no complaints of pain

## 2017-07-28 NOTE — PROGRESS NOTES
Noted pt with a debreidment yesterday (7/27) by podiatry. Recommendation for vascular consult has been made by podiatry. Podiatry has recommended and angiogram.  CM continuing to follow and assist as plan of care needs become definite.

## 2017-07-28 NOTE — PROGRESS NOTES
Patient was visited by AMARJIT. Volunteer followed up with patient and/or family and reported no needs to this . Chaplains will continue to follow and will provide pastoral care as needed or requested. Maryellen Bragg [address] [place] [address] Intern  [phone] - Office

## 2017-07-28 NOTE — PROGRESS NOTES
Hospitalist Progress Note    Patient: Coco Mcarthur MRN: [id number]  CSN: [id number]    YOB: 1965  Age: 46 y.o. Sex: male    DOA: 2017 LOS:  LOS: 4 days          Chief Complaint:    Skin Problem    Assessment/Plan     Hospital Problems  Never Reviewed          Codes Class Noted POA    * (Principal)Cellulitis ICD-10-CM: L03.90  ICD-9-CM: 682.9  2017 Unknown        Hyperglycemia due to type 2 diabetes mellitus ([facility] [address] ICD-10-CM: E11.65  ICD-9-CM: 250.00  2017 Unknown        Hyponatremia ICD-10-CM: E87.1  ICD-9-CM: 276.1  2017 Unknown              Coco Mcarthur is a 46 y.o. male who was admitted on 17 for left foot cellulitis after stepping on a nail 3 days prior to admission. He was noted to be tachycardic w/ WBC 16.7k and was given initial fluid challenge in ED. His A1c was noted to be 11.2% on admission which represents a new DM diagnosis for him. He is s/p I&D with debridement on 17 by podiatry. Vascular consult requested by podiatry.          Cellulitis (2017)  - WBC improving  - on IV vancomycin and levaquin  - CT of left foot on 17 w/ suspicion for osteomyelitis  - s/p debridement on  by podiatry  - consulting vascular per podiatry request    DM2  - Inpatient goal: Glucose 140-180mg/dL  - Latest glucose value: 251mg/dL @ 1122  - Total daily insulin dose (past 24h): 49 units  - Correctional dosing adjusted: 52 units  - Treatment regimen:   - Lantus: increase to 28 units from 24 units daily   - Humalo + SSI coverage  - Recalculate daily  - Latest A1c: 11.2% on admission       Hyponatremia (2017)  - improving now at 132 mmol/L  - continue IV hydration w/ NS    PT/OT    CM for DC planning    FENGI: NS @ 100cc/hr; p-lvx; diabetic diet; no GI prophylaxis needed    Dispo: follow up vascular consult continue inpatient      Subjective:    No new complaints    Review of systems:    Constitutional: denies fevers, chills, myalgias, diaphoresis  Respiratory: denies shortness of breath, cough, wheezing  Cardiovascular: denies chest pain, palpitations  Gastrointestinal: denies nausea, vomiting, diarrhea, constipation      Vital signs/Intake and Output:  Visit Vitals    /58 (BP 1 Location: Right arm, BP Patient Position: At rest)    Pulse 92    Temp 98.8 °F (37.1 °C)    Resp 20    Ht 5' 10\" (1.778 m)    Wt 85.7 kg (189 lb)    SpO2 99%    BMI 27.12 kg/m2     Current Shift:  07/28 0701 - 07/28 1900  In: -   Out: 650 [Urine:650]  Last three shifts:  07/26 1901 - 07/28 0700  In: 1591.7 [P.O.:200;  I.V.:1391.7]  Out: 1175 [Urine:1175]    Exam:    General: Awake and alert, no acute distress, resting comfortably in bed  Head: Atraumatic, normocephalic  Eyes: PERRLA, EOMI, sclerae non-icteric  ENT: mucous membranes moist, palate elevates evenly, tongue midline  Neck: supple, no masses, no lymphadenopathy, no rigidity   CVS:Regular rate and rhythm, no murmurs, rubs, gallops, or clicks  Lungs:Clear to auscultation bilaterally, no wheezes, rales, or rhonchi  Abdomen: Soft, Nontender, nondistended, bowel sounds normal throughout  Extremities: left foot markedly improved post-debridement, erythema much better, dressing present, streaking on leg pretty much gone, pulse still barely palpable otherwise atraumatic, pulses 1+ all other ext; strength 5/5 all ext  Skin: as above, otherwise warm, dry, well perfused, without rash; no cyanosis or clubbing  Neuro: CN II-XII grossly intact, no focal neurologic deficits  Psych: awake, alert, and oriented x 3; full range of mood and affect                Labs: Results:       Chemistry Recent Labs      07/28/17   0249  07/27/17   0257  07/26/17   0110   GLU  192*  174*  147*   NA  132*  133*  133*   K  3.6  3.6  3.6   CL  98*  97*  99*   CO2  28  24  27   BUN  8  7  8   CREA  0.94  0.86  0.85   CA  8.3*  8.4*  8.4*   AGAP  6  12  7   BUCR  9*  8*  9*      CBC w/Diff Recent Labs      07/28/17   0249  07/27/17 0257  07/26/17   0110   WBC  13.1  16.8*  14.8*   RBC  3.93*  4.18*  4.29*   HGB  11.7*  12.3*  12.6*   HCT  33.1*  34.9*  36.3   PLT  260  251  244      Cardiac Enzymes No results for input(s): CPK, CKND1, KARY in the last 72 hours. No lab exists for component: CKRMB, TROIP   Coagulation No results for input(s): PTP, INR, APTT in the last 72 hours. No lab exists for component: INREXT, INREXT    Lipid Panel No results found for: CHOL, CHOLPOCT, CHOLX, CHLST, CHOLV, 521376, HDL, LDL, LDLC, DLDLP, 026757, VLDLC, VLDL, TGLX, TRIGL, TRIGP, TGLPOCT, CHHD, CHHDX   BNP No results for input(s): BNPP in the last 72 hours. Liver Enzymes No results for input(s): TP, ALB, TBIL, AP, SGOT, GPT in the last 72 hours.     No lab exists for component: DBIL   Thyroid Studies No results found for: T4, T3U, TSH, TSHEXT, TSHEXT     Procedures/imaging: see electronic medical records for all procedures/Xrays and details which were not copied into this note but were reviewed prior to creation of [facility], DO

## 2017-07-28 NOTE — PERIOP NOTES
Patient sitting up in bed laughing. Family outside of door, in hallway waiting to see patient. All questions answered. VSS.   Visit Vitals    /73 (BP 1 Location: Right arm, BP Patient Position: At rest)    Pulse 94    Temp 98.5 °F (36.9 °C)    Resp 16    Ht 5' 10\" (1.778 m)    Wt 85.7 kg (189 lb)    SpO2 100%    BMI 27.12 kg/m2
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
## 2017-07-28 NOTE — PROGRESS NOTES
Shift Summary :  No complaints of pain. Ate tachos on return from surgery without nausea. Dressing to left foot bulky with toes bruised and slightly cool but mobile. Hard to detect pulse through dressing. Antibiotics continued.

## 2017-07-28 NOTE — ROUTINE PROCESS
TRANSFER - IN REPORT:    Verbal report received from  GILBERT Butler RN (name) on Piotr Vilchis  being received from [employer]) for ordered procedure      Report consisted of patients Situation, Background, Assessment and   Recommendations(SBAR). Information from the following report(s) SBAR, Kardex, OR Summary, Recent Results and Med Rec Status was reviewed with the receiving nurse. Opportunity for questions and clarification was provided. Assessment completed upon patients arrival to unit and care assumed.

## 2017-07-28 NOTE — OP NOTES
[address] [place] [address]  OPERATIVE REPORT    Name:  Jose Adamson  MR#:  [id number]  :  1965  Account #:  [de-identified]  Date of Adm:  2017  Date of Surgery:  2017      NAME OF SURGEON: Sandie Orozco DPM    ASSISTANT: None    PREOPERATIVE DIAGNOSES  1. Cellulitis of the left foot, status post puncture wound. 2. Diabetes with neurological manifestations. 3. Peripheral arterial disease, left foot. POSTOPERATIVE DIAGNOSES  1. Cellulitis of the left foot, status post puncture wound. 2. Diabetes with neurological manifestations. 3. Peripheral arterial disease, left foot. 4. Abscess of the left foot. PROCEDURES PERFORMED: Incision and drainage, left foot, and excisional biopsy  debridement. ANESTHESIA: General.    ESTIMATED BLOOD LOSS: Less than 30 mL. SPECIMENS: I took aerobic and anaerobic wound cultures, as well as  Gram stain and fungal culture, as well as acid-fast bacillus stain of the  left foot. FINDINGS: The second through fourth digits of the left foot are dusky  with sluggish capillary refill. Temperature of the toes is tepid. Focal  erythema noted along the dorsal aspect of the foot, centralized over  the dorsal third interspace. Mushy soft tissue was noted along the  plantar third interspace, along the lateral aspect of the proximal  phalanx. There was approximately 10 mL of foul odorous purulent  drainage noted in the second, third and fourth interspaces dorsally. Gangrenous tissue was noted associated with the mushiness that was  previously described in the plantar third interspace. All tendons were  intact, and no probing went to bone. COMPLICATIONS: None. IMPLANTS: No implants logged. DRAINS: Postoperatively, I packed the wound with 1/2-inch iodoform  gauze in the dorsal and plantar aspect of the foot.     DESCRIPTION OF OPERATION: This very pleasant 49-year-old male  was taken to the operating room, where he was sedated and general  anesthesia administered. The patient's left foot and leg were prepped  and draped in the usual sterile fashion. No tourniquet was used for this  procedure. Attention was directed to the dorsal foot, where an incision was made  from the tarsal bones down over the third interspace to its sulcus. Bleeders were coagulated. Vital structures were identified and  retracted. Blunt dissection continued down through the subcutaneous  tissue, where along the distal 1/2 of the incision, purulent odorous  drainage was noted. This drainage went down into the second, third  and fourth interspaces. Deep aerobic and anaerobic wound cultures  were taken, as well as fungal cultures and acid-fast bacillus stain. The incision was then carried through the third interspace, down along  the plantar aspect of the foot through the puncture wound, which was  noted. The loose epidermis was surgically debrided. The plantar skin  was intact, except for the puncture area. There was mushiness noted  along the plantar third interspace, particularly along the lateral aspect  of the third proximal phalanx. This tissue was mushy and nonviable  down into the deep interspace, almost to the level of the  metatarsophalangeal joint region. Using a 15-blade, the necrotic tissue was debrided down to, but not  including the bone of the tendons. All nonviable tissues were debrided. The wound was then copiously irrigated with 4 liters of normal saline  solution and bacitracin irrigant. Post-debridement decreased erythema  along the dorsal aspect of the foot was noted, as well as improved  capillary refill involving the fourth toe. The capillary refill to the third toe  was still sluggish; however, at the second toe, although purplish in  discoloration, there was an immediate capillary refill. The wound was then packed lightly into the second, third and fourth  interspaces, as well as throughout the wound with 1/2-inch iodoform  gauze. Xeroform gauze was then applied, followed by a bulky  noncompressive dressing. The patient was taken from the surgical theater to PACU in satisfactory  condition. There were no complications from anesthesia or from  surgery. The tourniquet time was 0.0 minutes. The estimated blood loss was  less than 30 mL.         Silvana Barnes DPM      / Torie Marsh  D:  07/27/2017   18:29  T:  07/27/2017   23:16  Job #:  [id number]

## 2017-07-28 NOTE — ROUTINE PROCESS
Bedside and Verbal shift change report given to Chris Carcamo RN (oncoming nurse) by Reece Ramon   (offgoing nurse). Report included the following information SBAR, Procedure Summary, Intake/Output and MAR.

## 2017-07-29 NOTE — ROUTINE PROCESS
Bedside shift change report given to Florecita Barber (oncoming nurse) by Elia Gómez RN (offgoing nurse). Report included the following information SBAR, Kardex, MAR, Recent Results and Alarm Parameters .

## 2017-07-29 NOTE — PROGRESS NOTES
Pt seen in consultation yesterday by Dr Virginia Roland    Per his note: unfortunately pt's RAFAEL test did not include waveforms or toe pressures to comment on healing potential of his wound. He does have ischemic toes and despite evidence of bleeding at the surgical site I believe he would benefit from a diagnostic angiogram. I am out of town next week, will discuss with my partners to see about timing of angiogram in my absence given their surgical schedules. Local wound care per Dr. Julia Blum as well as weight bearing status    Discussed with Dr Helene Mendez this morning, who will be covering Dr Karine Wan service  Discussed with patient as well  Will anticipate diagnostic/interventional angiogram on Tuesday. Time of procedure to still be determined  Per podiatry notes, agree that at least 3rd toe will require amputation. Monitor 4th toe.

## 2017-07-29 NOTE — PROGRESS NOTES
Bedside and Verbal shift change report given to 1 Winnie Paula (oncoming nurse) by Stanley Ayoub RN (offgoing nurse). Report included the following information SBAR and Kardex.      Patient Vitals for the past 12 hrs:   Temp Pulse Resp BP SpO2   07/29/17 1112 98.7 °F (37.1 °C) 83 18 131/66 99 %   07/29/17 0715 98.7 °F (37.1 °C) 79 20 124/67 100 %   07/29/17 0440 99.1 °F (37.3 °C) 85 20 132/66 100 %

## 2017-07-29 NOTE — PROGRESS NOTES
Assessment:     Dysvascular 3rd toe left foot  PAD bilateral LE     Hospital Problems  Never Reviewed          Codes Class Noted POA    * (Principal)Cellulitis ICD-10-CM: L03.90  ICD-9-CM: 682.9  7/24/2017 Unknown        Hyperglycemia due to type 2 diabetes mellitus ([facility] [address] ICD-10-CM: E11.65  ICD-9-CM: 250.00  7/24/2017 Unknown        Hyponatremia ICD-10-CM: E87.1  ICD-9-CM: 276.1  7/24/2017 Unknown                Plan:     Local wound care until vascular status has been clarified. Vascular intervention is necessary for limb salvage. At a minimum, the third toe will require an amputation. The patient should avoid weight bearing on the left LE.      Subjective:     Current Facility-Administered Medications   Medication Dose Route Frequency    [START ON 7/30/2017] insulin glargine (LANTUS) injection 34 Units  34 Units SubCUTAneous DAILY    insulin lispro (HUMALOG) injection 10 Units  10 Units SubCUTAneous TIDAC    vancomycin (VANCOCIN) 1,250 mg in 0.9% sodium chloride 250 mL IVPB  1,250 mg IntraVENous Q8H    insulin lispro (HUMALOG) injection   SubCUTAneous AC&HS    levoFLOXacin (LEVAQUIN) 750 mg in D5W IVPB  750 mg IntraVENous Q24H    0.9% sodium chloride infusion  100 mL/hr IntraVENous CONTINUOUS    acetaminophen (TYLENOL) solution 650 mg  650 mg Oral Q6H PRN    HYDROcodone-acetaminophen (NORCO) 5-325 mg per tablet 1 Tab  1 Tab Oral Q4H PRN    ondansetron (ZOFRAN ODT) tablet 4 mg  4 mg Oral Q6H PRN    enoxaparin (LOVENOX) injection 40 mg  40 mg SubCUTAneous Q24H    Vancomycin - pharmacokinetic consult  1 Each Other PRN    glucose chewable tablet 16 g  4 Tab Oral PRN         Objective:     Visit Vitals    /66 (BP 1 Location: Right arm, BP Patient Position: At rest)    Pulse 83    Temp 98.7 °F (37.1 °C)    Resp 18    Ht 5' 10\" (1.778 m)    Wt 81.5 kg (179 lb 9.6 oz)    SpO2 99%    BMI 25.77 kg/m2       General Appearance:  alert, well developed, cooperative and pleasant   Extremities:  Deep purplish hue 3rd toe with no capillary refill. Duskiness of the 2nd and 4th digits with a capillary refill present. Surgical wound is stable.     Neuro:No focal neurological deficits noted    Recent Results (from the past 24 hour(s))   GLUCOSE, POC    Collection Time: 07/28/17  4:42 PM   Result Value Ref Range    Glucose (POC) 153 (H) 70 - 110 mg/dL   GLUCOSE, POC    Collection Time: 07/28/17  9:29 PM   Result Value Ref Range    Glucose (POC) 171 (H) 70 - 110 mg/dL   VANCOMYCIN, RANDOM    Collection Time: 07/28/17  9:52 PM   Result Value Ref Range    Vancomycin, random 13.1 5.0 - 80.6 UG/ML   METABOLIC PANEL, BASIC    Collection Time: 07/29/17  5:25 AM   Result Value Ref Range    Sodium 133 (L) 136 - 145 mmol/L    Potassium 3.6 3.5 - 5.5 mmol/L    Chloride 98 (L) 100 - 108 mmol/L    CO2 28 21 - 32 mmol/L    Anion gap 7 3.0 - 18 mmol/L    Glucose 212 (H) 74 - 99 mg/dL    BUN 9 7.0 - 18 MG/DL    Creatinine 0.99 0.6 - 1.3 MG/DL    BUN/Creatinine ratio 9 (L) 12 - 20      GFR est AA >60 >60 ml/min/1.73m2    GFR est non-AA >60 >60 ml/min/1.73m2    Calcium 8.5 8.5 - 10.1 MG/DL   CBC W/O DIFF    Collection Time: 07/29/17  5:25 AM   Result Value Ref Range    WBC 12.7 4.6 - 13.2 K/uL    RBC 4.07 (L) 4.70 - 5.50 M/uL    HGB 11.9 (L) 13.0 - 16.0 g/dL    HCT 34.6 (L) 36.0 - 48.0 %    MCV 85.0 74.0 - 97.0 FL    MCH 29.2 24.0 - 34.0 PG    MCHC 34.4 31.0 - 37.0 g/dL    RDW 13.0 11.6 - 14.5 %    PLATELET 835 757 - 556 K/uL    MPV 9.6 9.2 - 11.8 FL   GLUCOSE, POC    Collection Time: 07/29/17  7:19 AM   Result Value Ref Range    Glucose (POC) 200 (H) 70 - 110 mg/dL   GLUCOSE, POC    Collection Time: 07/29/17 11:50 AM   Result Value Ref Range    Glucose (POC) 293 (H) 70 - 110 mg/dL         Imaging: No new images    Brian Peralta DPM

## 2017-07-29 NOTE — PROGRESS NOTES
Shift Summary- Pt had an uneventful shift. Minimal pain reported during the night. No changes in pt condition.       Patient Vitals for the past 12 hrs:   Temp Pulse Resp BP SpO2   07/29/17 0440 99.1 °F (37.3 °C) 85 20 132/66 100 %   07/29/17 0032 99.4 °F (37.4 °C) 81 18 125/63 98 %   07/28/17 1951 99.5 °F (37.5 °C) 87 18 124/62 98 %
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
## 2017-07-30 NOTE — ROUTINE PROCESS
Bedside and Verbal shift change report given to RUTHANN Maguire RN (oncoming nurse) by Pito Rene (offgoing nurse). Report included the following information SBAR, Kardex, Intake/Output and MAR.

## 2017-07-30 NOTE — PROGRESS NOTES
Shift summary:     Pt. Rested in bed, call light within reach. Family visited. Eating well. Dressing changed per MD orders, supplies bedside. Pain managed per 1 tab Norco prn.      Patient Vitals for the past 12 hrs:   Temp Pulse Resp BP SpO2   07/29/17 2242 98.6 °F (37 °C) 79 18 113/59 98 %   07/29/17 1939 98.8 °F (37.1 °C) 81 18 138/69 97 %   07/29/17 1543 98.2 °F (36.8 °C) 84 18 128/66 96 %   07/29/17 1251 - - - - 99 %

## 2017-07-30 NOTE — PROGRESS NOTES
Shift Summary:  Uneventful shift. Patient denied pain or discomfort. Dressing to left food CDI and elevated on pillow. Pedal pulses palpable to left foot, sensation present, cap refill greater than 3 seconds.

## 2017-07-30 NOTE — ROUTINE PROCESS
Verbal shift change report given to Gisela You RN (oncoming nurse) by Laura Barrientos   (offgoing nurse). Report included the following information SBAR, Kardex and MAR.

## 2017-07-30 NOTE — PROGRESS NOTES
Patient alert and awake with no c/o pain ir distress. Assessment complete. Call light in reach, safety and comfort measures are in place.

## 2017-07-30 NOTE — PROGRESS NOTES
Hospitalist Progress Note    Patient: Corinne Lively MRN: [id number]  CSN: [id number]    YOB: 1965  Age: 46 y.o. Sex: male    DOA: 7/24/2017 LOS:  LOS: 6 days          Chief Complaint: cellulitis. Assessment/Plan     Cellulitis L foot  Osteomyelitis L foot. Probably PVD of lower extremity. DM2 uncontrolled. a1c 11.2  Hyponatremia.     Continue antibiotics. Podiatry and Vascular following. Will need vascular assessment. Eval pend. Wound care. Toes look bad. Dusky. I see some degree of amputation in his near future. Adjust dm meds. Add statin for dyslipidemia and vascular disease. Goal LDL <100. DVT px. Still needs inpatient eval/tx.     Full code. Patient Active Problem List   Diagnosis Code    Cellulitis L03.90    Hyperglycemia due to type 2 diabetes mellitus ([facility] [address] E11.65    Hyponatremia E87.1       Subjective:    Feeling well. Podiatry continues ot follow. Remains on antibiotics for his foot. Vascular eval this week. Review of systems:    Constitutional: denies fevers, chills, myalgias  Respiratory: denies SOB, cough  Cardiovascular: denies chest pain, palpitations  Gastrointestinal: denies nausea, vomiting, diarrhea      Vital signs/Intake and Output:  Visit Vitals    /76 (BP 1 Location: Right arm, BP Patient Position: At rest)    Pulse 86    Temp 98.4 °F (36.9 °C)    Resp 18    Ht 5' 10\" (1.778 m)    Wt 81.5 kg (179 lb 9.6 oz)    SpO2 98%    BMI 25.77 kg/m2     Current Shift:     Last three shifts:  07/28 1901 - 07/30 0700  In: 3388.3 [P.O.:240; I.V.:3148.3]  Out: 3550 [Urine:3550]    Exam:    General: Well developed, alert, NAD, OX3  Head/Neck: NCAT, supple, No masses, No lymphadenopathy  CVS:Regular rate and rhythm, no M/R/G, S1/S2 heard, no thrill  Lungs:Clear to auscultation bilaterally, no wheezes, rhonchi, or rales  Abdomen: Soft, Nontender, No distention, Normal Bowel sounds, No hepatomegaly  Extremities: No edema.   Left foot in dressing. Toes 2,3,4 dusky. Warm to touch. Labs: Results:       Chemistry Recent Labs      07/30/17 0235 07/29/17 0525 07/28/17   0249   GLU  113*  212*  192*   NA  136  133*  132*   K  4.1  3.6  3.6   CL  101  98*  98*   CO2  29  28  28   BUN  9  9  8   CREA  0.87  0.99  0.94   CA  8.1*  8.5  8.3*   AGAP  6  7  6   BUCR  10*  9*  9*      CBC w/Diff Recent Labs      07/30/17 0235 07/29/17 0525 07/28/17   0249   WBC  11.8  12.7  13.1   RBC  4.13*  4.07*  3.93*   HGB  12.0*  11.9*  11.7*   HCT  35.1*  34.6*  33.1*   PLT  298  279  260      Cardiac Enzymes No results for input(s): CPK, CKND1, KARY in the last 72 hours. No lab exists for component: CKRMB, TROIP   Coagulation No results for input(s): PTP, INR, APTT in the last 72 hours. No lab exists for component: INREXT    Lipid Panel Lab Results   Component Value Date/Time    Cholesterol, total 148 07/30/2017 02:35 AM    HDL Cholesterol 26 07/30/2017 02:35 AM    LDL, calculated 104.6 07/30/2017 02:35 AM    VLDL, calculated 17.4 07/30/2017 02:35 AM    Triglyceride 87 07/30/2017 02:35 AM    CHOL/HDL Ratio 5.7 07/30/2017 02:35 AM      BNP No results for input(s): BNPP in the last 72 hours. Liver Enzymes No results for input(s): TP, ALB, TBIL, AP, SGOT, GPT in the last 72 hours.     No lab exists for component: DBIL   Thyroid Studies No results found for: T4, T3U, TSH, TSHEXT     Procedures/imaging: see electronic medical records for all procedures/Xrays and details which were not copied into this note but were reviewed prior to creation of Epifanio Jimenes MD

## 2017-07-30 NOTE — PROGRESS NOTES
The 3rd digit is unchanged from 7/29/17. However, I now am having concerns about 4th digit and the 2nd digit.      7/30/2017  8:35 AM - [facility], [facility]       Component Results      Component Value Flag Ref Range Units Status     Special Requests: NO SPECIAL REQUESTS     Final     GRAM STAIN NO ORGANISMS SEEN     Final     GRAM STAIN NO WBC'S SEEN     Final     Culture result:  (A)    Final     MODERATE ENTEROCOCCUS FAECALIS GROUP D     Culture result:  (A)    Final     FEW STAPHYLOCOCCUS EPIDERMIDIS     Culture result:  (A)    Final     FEW STREPTOCOCCUS VIRIDANS       Culture & Susceptibility      ENTEROCOCCUS FAECALIS GROUP D      Antibiotic Sensitivity SARTHAK Unit Status     Ampicillin ($) Susceptible <=2 ug/mL Final     Method: SARTHAK     Gentamicin Synergy S Susceptible  ug/mL Final     Method: SARTHAK     Penicillin G ($$) Susceptible 2 ug/mL Final     Method: SARTHAK     Streptomycin Synergy Susceptible  ug/mL Final     Method: SARTHAK     Tigecycline ($$$$) Susceptible <=0.12 ug/mL Final     Method: SARTHAK     Vancomycin ($) Susceptible 1 ug/mL Final     Method: SARTHAK                 STAPHYLOCOCCUS EPIDERMIDIS      Antibiotic Sensitivity SARTHAK Unit Status     Ampicillin/sulbactam ($) Resistant  ug/mL Final     Method: SARTHAK     Cefazolin ($) Resistant  ug/mL Final     Method: SARTHAK     Clindamycin ($) Susceptible <=0.25 ug/mL Final     Method: SARTHAK     Erythromycin ($$$$) Resistant >=8 ug/mL Final     Method: SARTHAK     Gentamicin ($) Susceptible <=0.5 ug/mL Final     Method: SARTHAK     Levofloxacin ($) Resistant >=8 ug/mL Final     Method: SARTHAK     Oxacillin Resistant >=4 ug/mL Final     Method: SARTHAK     Penicillin G ($$) Resistant >=0.5 ug/mL Final     Method: SARTHAK     Rifampin ($$$$) Susceptible <=0.5 ug/mL Final     Method: SARTHAK     Tetracycline Susceptible 2 ug/mL Final     Method: SARTHAK     Tigecycline ($$$$) Susceptible 0.25 ug/mL Final     Method: SARTHAK     Trimeth-Sulfamethoxa Susceptible <=10 ug/mL Final     Method: SARTAHK     Vancomycin ($) Susceptible 1 ug/mL Final     Method: SARTHAK                    A: Dysvascular digits 3rd and 4th left, dysvascular 4th toe, PAD bilateral LE    P: Diagnostic/interventional angio by vascular service pending. Local wound care until the definitive level of healing can be achieved.     Ubaldo Hoyos, TYESHA

## 2017-07-31 PROBLEM — H54.40 BLIND LEFT EYE: Status: ACTIVE | Noted: 2017-01-01

## 2017-07-31 NOTE — PROGRESS NOTES
Upon clinical review, noted plan for angiogram of left leg tomorrow (8/1). CM will follow up post procedure. Continuing to follow.

## 2017-07-31 NOTE — ROUTINE PROCESS
Bedside and Verbal shift change report given to Mojgan Granados RN (oncoming nurse) by Man Lee RN (offgoing nurse). Report included the following information SBAR, Kardex, ED Summary, Procedure Summary, Intake/Output, MAR, Recent Results and Med Rec Status.

## 2017-07-31 NOTE — PROGRESS NOTES
As per discussion over weekend, Dr Zach Montgomery will perform diagnostic angiogram of left leg tomorrow, with possible endovascular intervention   NPO and consent orders on chart

## 2017-07-31 NOTE — PROGRESS NOTES
PA with patient     Shift summary: uneventful    Pt. Rested in bed, watching t.v. With visitors throughout shift. Eating well. No complaints of pain. Dressing changed by prior nurse. Call light within reach.

## 2017-07-31 NOTE — PROGRESS NOTES
Hospitalist Progress Note-critical care note     Patient: Zion Barkley MRN: [id number]  CSN: [id number]    YOB: 1965  Age: 46 y.o. Sex: male    DOA: 7/24/2017 LOS:  LOS: 7 days            Chief complaint:    Assessment/Plan         Hospital Problems  Never Reviewed          Codes Class Noted POA    * (Principal)Cellulitis ICD-10-CM: L03.90  ICD-9-CM: 682.9  7/24/2017 Unknown        Hyperglycemia due to type 2 diabetes mellitus ([facility] [address] ICD-10-CM: E11.65  ICD-9-CM: 250.00  7/24/2017 Unknown        Hyponatremia ICD-10-CM: E87.1  ICD-9-CM: 276.1  7/24/2017 Unknown              1. Cellulitis L foot and Osteomyelitis L foot. podiatry and vascular on board   need further evaluation for  Pad  Black toes noted   2. Probably PVD of lower extremity. Will angiogram per vascular   Npo now   3. DM2 uncontrolled. a1c 11.2  Uncontrolled   Lantus, premeal and ssi   Need education     4 Hyponatremia  Will give ns and continue monitor  5 left eye blindness   Fall precaution     Subjective: feel fine   Nurse: no acute issue       Review of systems:    General: No fevers or chills. Cardiovascular: No chest pain or pressure. No palpitations. Pulmonary: No shortness of breath. Gastrointestinal: No nausea, vomiting. Vital signs/Intake and Output:  Visit Vitals    /66 (BP 1 Location: Right arm, BP Patient Position: At rest)    Pulse 85    Temp 98.4 °F (36.9 °C)    Resp 18    Ht 5' 10\" (1.778 m)    Wt 81.5 kg (179 lb 9.6 oz)    SpO2 98%    BMI 25.77 kg/m2     Current Shift:  07/31 0701 - 07/31 1900  In: 240 [P.O.:240]  Out: -   Last three shifts:  07/29 1901 - 07/31 0700  In: 1850 [I.V.:1850]  Out: 9023 [Urine:4435]    Physical Exam:  General: WD, WN. Alert, cooperative, no acute distress    HEENT: NC, Atraumatic. Rt : PERRLA, anicteric sclerae. Left eye blindness   Lungs: CTA Bilaterally. No Wheezing/Rhonchi/Rales.   Heart:  Regular  rhythm,  No murmur, No Rubs, No Gallops  Abdomen: Soft, Non distended, Non tender.  +Bowel sounds,   Extremities: No c/c. Left foot-wrapped,  Blue toes-2-4. Psych:   Not anxious or agitated. Neurologic:  No acute neurological deficit. Labs: Results:       Chemistry Recent Labs      07/31/17 0306 07/30/17 0235 07/29/17   0525   GLU  257*  113*  212*   NA  132*  136  133*   K  3.7  4.1  3.6   CL  98*  101  98*   CO2  25  29  28   BUN  10  9  9   CREA  0.92  0.87  0.99   CA  8.4*  8.1*  8.5   AGAP  9  6  7   BUCR  11*  10*  9*      CBC w/Diff Recent Labs      07/31/17 0306 07/30/17 0235 07/29/17   0525   WBC  11.8  11.8  12.7   RBC  4.25*  4.13*  4.07*   HGB  12.4*  12.0*  11.9*   HCT  35.8*  35.1*  34.6*   PLT  351  298  279      Cardiac Enzymes No results for input(s): CPK, CKND1, KARY in the last 72 hours. No lab exists for component: CKRMB, TROIP   Coagulation Recent Labs      07/31/17   1000   PTP  13.7   INR  1.1       Lipid Panel Lab Results   Component Value Date/Time    Cholesterol, total 148 07/30/2017 02:35 AM    HDL Cholesterol 26 07/30/2017 02:35 AM    LDL, calculated 104.6 07/30/2017 02:35 AM    VLDL, calculated 17.4 07/30/2017 02:35 AM    Triglyceride 87 07/30/2017 02:35 AM    CHOL/HDL Ratio 5.7 07/30/2017 02:35 AM      BNP No results for input(s): BNPP in the last 72 hours. Liver Enzymes No results for input(s): TP, ALB, TBIL, AP, SGOT, GPT in the last 72 hours.     No lab exists for component: DBIL   Thyroid Studies No results found for: T4, T3U, TSH, TSHEXT     Procedures/imaging: see electronic medical records for all procedures/Xrays and details which were not copied into this note but were reviewed prior to creation of Lopez Sunshine MD

## 2017-07-31 NOTE — PROGRESS NOTES
0730 Bedside report received from Hoang Tucker RN.    0457 Dressing changed. [address] changed. Shift uneventful. Pt is stable with no signs of distress.

## 2017-07-31 NOTE — PROGRESS NOTES
Patient was visited by AMARJIT. Volunteer followed up with patient and/or family and reported no needs to this . Chaplains will continue to follow and will provide pastoral care as needed or requested. [address], M.Div.   Board Certified   [phone] - Office

## 2017-07-31 NOTE — ROUTINE PROCESS
Verbal shift change report given to KIANA East (oncoming nurse) by Leia Boucher   (offgoing nurse). Report included the following information SBAR, Kardex and MAR.

## 2017-07-31 NOTE — PROGRESS NOTES
Problem: Mobility Impaired (Adult and Pediatric)  Goal: *Acute Goals and Plan of Care (Insert Text)  In 1-7 days pt will be able to perform:  ST. Bed mobility: Rolling L to R to L modified independent for positioning. 2. Supine to sit to supine S with HR for meals. 3. Sit to stand to sit S with RW in prep for ambulation. LT. Gait: Ambulate >150ft S with RW, L NWB, for home/community mobility. 2. Stair Negotiation: Ascend/descend >3 steps L NWB CGA with HRs for home entry. 3. Activity tolerance: Tolerate up in chair 1-2 hours for ADLs. 4. Patient/Family Education: Patient/family to be independent with HEP for follow-up care and safe discharge. Outcome: Progressing Towards Goal  PHYSICAL THERAPY TREATMENT     Patient: Mango Baltazar (84 y.o. male)  Date: 2017  Diagnosis: Cellulitis  CELLULITIS OF LEFT FOOT Cellulitis  Procedure(s) (LRB):  LEFT FOOT DEBRIDEMENT WITH C-ARM (Left) 4 Days Post-Op  Precautions: Fall, WBAT, Other (comment) (with ortho boot in place L foot)   Chart, physical therapy assessment, plan of care and goals were reviewed. ASSESSMENT:  Pt with new orders for WBAT with ortho boot in place as above noted. Pt dressing left foot not intact on arrival.  Notified nurse Meena Ray, who subsequently changed dressing prior to further treatment. Pt first language is Malawian, however, pt able to complete session with v/tc and does understand simple requests during interactions. Pt educated in [address] [employer] status. Ortho boot applied to left foot. Pt able to complete transfers with CGA/vc/tc cues using RW. Progressed gt to 130ft with RW/WBAT/ortho boot L LE. Demonstrates reciprocal pattern and tends to primarily WB on L heel. Karlie decreased. Pt returned to room and left up in recliner with LE's elevated and in NAD with all needs in reach. Nurse Meena Ray aware. Will address goal change as appropriate and continue PT.   Progression toward goals:  [X]      Improving appropriately and progressing toward goals  [ ]      Improving slowly and progressing toward goals  [ ]      Not making progress toward goals and plan of care will be adjusted       PLAN:  Patient continues to benefit from skilled intervention to address the above impairments. Continue treatment per established plan of care. Discharge Recommendations:  Home Health  Further Equipment Recommendations for Discharge:  rolling walker       SUBJECTIVE:   Patient stated Feeling fine.       OBJECTIVE DATA SUMMARY:   Critical Behavior:  Neurologic State: Alert  Orientation Level: Oriented X4  Cognition: Appropriate decision making, Appropriate for age attention/concentration, Appropriate safety awareness, Follows commands  Functional Mobility Training:  Bed Mobility:  Supine to Sit: Modified independent  Scooting: Modified independent  Transfers:  Sit to Stand: Contact guard assistance; Other (comment) (vc as now WBAT L LE)  Stand to Sit: Stand-by asssistance; Other (comment) (vc as above)  Balance:  Sitting: Intact  Standing: Intact; With support (RW)  Ambulation/Gait Training:  Distance (ft): 130 Feet (ft)  Assistive Device: Gait belt;Walker, rolling  Ambulation - Level of Assistance: Contact guard assistance  Gait Abnormalities: Other;Trunk sway increased (vc for more erect posture)  Left Side Weight Bearing: As tolerated (more toward heel L foot)  Speed/Karlie: Pace decreased (<100 feet/min)  Interventions: Safety awareness training;Verbal cues; Visual/Demos  Pain:  Pain Scale 1: Numeric (0 - 10)  Pain Intensity 1: 0  Activity Tolerance:   Fair   Please refer to the flowsheet for vital signs taken during this treatment.   After treatment:   [X] Patient left in no apparent distress sitting up in recliner with LE's elevated  [ ] Patient left in no apparent distress in bed  [X] Call bell left within reach  [X] Nursing notified-Rosalina  [ ] Caregiver present  [ ] Bed alarm activated      John Chatman PT   Time Calculation: 32 mins

## 2017-07-31 NOTE — PROGRESS NOTES
conducted a Follow up consultation and Spiritual Assessment for Cynthia Suarez, who is a 46 y. o.,male. Continued the relationship of care and support. Listened empathically. Offered prayer and assurance of continued prayer on patients behalf. Plan:  Chaplains will continue to follow and will provide pastoral care as needed or requested.  recommends bedside caregivers page the  on duty if patient shows signs of acute spiritual or emotional distress. Father FELI BowersDiv.   [address] [facility] - Office

## 2017-07-31 NOTE — DIABETES MGMT
GLYCEMIC CONTROL PROGRESS NOTE:    -discussed in rounds, newly diagnosed DM, most likely Type 2  -POCT + basal + mealtime + corrective coverage orders in place  -both FBG + PPG out of target range  -TDD = 73 (34 Lantus + 30 mealtime + 9 corrective coverage)  -basal insulin increased today  Recommendations:   *monitor BG trends today and make adjustments as needed    Recent Glucose Results:   Lab Results   Component Value Date/Time     (H) 07/31/2017 10:00 AM     (H) 07/31/2017 03:06 AM    GLUCPOC 195 (H) 07/31/2017 11:28 AM    GLUCPOC 177 (H) 07/31/2017 07:42 AM    GLUCPOC 203 (H) 07/30/2017 06:32 PM           Nickola Krabbe RN, MS  Glycemic Control Team

## 2017-08-01 PROBLEM — M86.9 OSTEOMYELITIS OF LEFT FOOT (HCC): Status: ACTIVE | Noted: 2017-01-01

## 2017-08-01 NOTE — PROGRESS NOTES
The patient is off the floor. This patient is off the floor. Mr. Yuriy Butts will need to be reassigned to another podiatrist or the vascular service for additional surgical procedures as I will be out of the state for the next two weeks.      Sean Cedeño DPM

## 2017-08-01 NOTE — PROGRESS NOTES
Shift Summary : Slept all shift with no complaints. Bulky dressing to left foot intact. Toes dark colored and malodorous. NPO for angiogram. IVFs continued.

## 2017-08-01 NOTE — ROUTINE PROCESS
0800 am- walking rounds with off shift nurse. NPO for ANGIO. Denies pain. Left foot with ortho shoes. + sensation. Edema noted . Tibial pulses present by doppler. L foot dressing Intact. 1300 pm - IDR at bedside with Doctor gAusto May. Pt agreeing with the plan- no v/s.   prechecklist done. Pt is cooperative. Family at bedside . 1400 pm - to angio via bed- pt  Cooperative. 1600 PM PT NOT AVAILABLE. Still in radiology department. Bedside and Verbal shift change report given to Gus Peters RN (oncoming nurse) by Paulino Garcia RN   (offgoing nurse). Report included the following information SBAR, Kardex, Intake/Output, MAR and Med Rec Status.

## 2017-08-01 NOTE — PROGRESS NOTES
Problem: Mobility Impaired (Adult and Pediatric)  Goal: *Acute Goals and Plan of Care (Insert Text)  In 1-7 days pt will be able to perform:  ST. Bed mobility: Rolling L to R to L modified independent for positioning. 2. Supine to sit to supine S with HR for meals. 3. Sit to stand to sit S with RW in prep for ambulation. LT. Gait: Ambulate >150ft S with RW, L NWB, for home/community mobility. 2. Stair Negotiation: Ascend/descend >3 steps L NWB CGA with HRs for home entry. 3. Activity tolerance: Tolerate up in chair 1-2 hours for ADLs. 4. Patient/Family Education: Patient/family to be independent with HEP for follow-up care and safe discharge. 17 Updated goals to be met in 7 days:  ST. Bed mobility: Rolling L to R to L modified independent for positioning. 2. Supine to sit to supine S with HR for meals. 3. Sit to stand to sit S with RW in prep for ambulation. LT. Gait: Ambulate >150ft S with RW, L WBAT, for home/community mobility. 2. Stair Negotiation: Ascend/descend >3 steps L WBAT CGA with HRs for home entry. 3. Activity tolerance: Tolerate up in chair 1-2 hours for ADLs. 4. Patient/Family Education: Patient/family to be independent with HEP for follow-up care and safe discharge. Outcome: Progressing Towards Goal  PHYSICAL THERAPY TREATMENT     Patient: Nany Arroyo (89 y.o. male)  Date: 2017  Diagnosis: Cellulitis  CELLULITIS OF LEFT FOOT Cellulitis  Procedure(s) (LRB):  LEFT FOOT DEBRIDEMENT WITH C-ARM (Left) 5 Days Post-Op  Precautions: Fall, WBAT, Other (comment) (with ortho boot in place L foot)   Chart, physical therapy assessment, plan of care and goals were reviewed. ASSESSMENT:  Pt continues to progress with mobility, and increasing ambulation distance with RW. Some VCs needed for correct RW management and safety. Especially negotiating obstacles in woods. Mild fatigue post ambulation, and pt requesting to return to bed. Cont POC.    Progression toward goals:  [ ]      Improving appropriately and progressing toward goals  [X]      Improving slowly and progressing toward goals  [ ]      Not making progress toward goals and plan of care will be adjusted       PLAN:  Patient continues to benefit from skilled intervention to address the above impairments. Continue treatment per established plan of care. Discharge Recommendations:  Home Health  Further Equipment Recommendations for Discharge:  rolling walker       SUBJECTIVE:   Patient stated  I was sleep      OBJECTIVE DATA SUMMARY:   Critical Behavior:  Neurologic State: Alert, Appropriate for age  Orientation Level: Oriented X4  Cognition: Follows commands     Functional Mobility Training:  Bed Mobility:  Rolling: Modified independent  Supine to Sit: Modified independent  Sit to Supine: Modified independent  Scooting: Modified independent  Transfers:  Sit to Stand: Contact guard assistance;Stand-by asssistance  Stand to Sit: Stand-by asssistance  Balance:  Sitting: Intact  Standing: Intact; With support  Ambulation/Gait Training:  Distance (ft): 150 Feet (ft)  Assistive Device: Walker, rolling;Gait belt (L shoe)  Ambulation - Level of Assistance: Contact guard assistance  Gait Abnormalities: Path deviations  Left Side Weight Bearing: As tolerated (more toward heel L foot)  Speed/Karlie: Slow  Interventions: Safety awareness training;Verbal cues     Pain:  Pain Scale 1: Numeric (0 - 10)  Pain Intensity 1: 0  Activity Tolerance:   Good     After treatment:   [ ] Patient left in no apparent distress sitting up in chair  [X] Patient left in no apparent distress in bed  [X] Call bell left within reach  [ ] Nursing notified  [ ] Caregiver present  [ ] Bed alarm activated      Natasha Green PTA   Time Calculation: 14 mins

## 2017-08-01 NOTE — PROGRESS NOTES
Hospitalist Progress Note-critical care note     Patient: Yulissa Kidd MRN: [id number]  CSN: [id number]    YOB: 1965  Age: 46 y.o. Sex: male    DOA: 7/24/2017 LOS:  LOS: 8 days            Chief complaint:    Assessment/Plan         Hospital Problems  Never Reviewed          Codes Class Noted POA    Blind left eye ICD-10-CM: H54.42  ICD-9-CM: 369.60  7/31/2017 Unknown        * (Principal)Cellulitis ICD-10-CM: L03.90  ICD-9-CM: 682.9  7/24/2017 Unknown        Hyperglycemia due to type 2 diabetes mellitus ([facility] [address] ICD-10-CM: E11.65  ICD-9-CM: 250.00  7/24/2017 Unknown        Hyponatremia ICD-10-CM: E87.1  ICD-9-CM: 276.1  7/24/2017 Unknown              1. Cellulitis L foot and Osteomyelitis L foot. podiatry and vascular on board   need further evaluation for  Pad today. Will continue abx   2. Probably PVD of lower extremity. Will angiogram per vascular today   Npo now   3. DM2 uncontrolled. a1c 11.2  Uncontrolled   Will continue Lantus, premeal and ssi   Need education     4 Hyponatremia  Mild improving. Continue ns and continue monitor    5 left eye blindness   Fall precaution     Subjective: feel ok   Nurse: no acute issue, insulin was hold      Will hold premeal insulin and continue lantus, and restart premeal when start eating. Review of systems:    General: No fevers or chills. Cardiovascular: No chest pain or pressure. No palpitations. Pulmonary: No shortness of breath. Gastrointestinal: No nausea, vomiting. Vital signs/Intake and Output:  Visit Vitals    /54 (BP 1 Location: Right arm, BP Patient Position: At rest)    Pulse 87    Temp 98.1 °F (36.7 °C)    Resp 22    Ht 5' 10\" (1.778 m)    Wt 84.5 kg (186 lb 4.6 oz)    SpO2 100%    BMI 26.73 kg/m2     Current Shift:  08/01 0701 - 08/01 1900  In: 0   Out: 1100 [WAYFK:2206]  Last three shifts:  07/30 1901 - 08/01 0700  In: 3153.8 [P.O.:720; I.V.:2433.8]  Out: 2435 [Urine:2435]    Physical Exam:  General: WD, WN.   Alert, cooperative, no acute distress    HEENT: NC, Atraumatic. Rt : PERRLA, anicteric sclerae. Left eye blindness   Lungs: CTA Bilaterally. No Wheezing/Rhonchi/Rales. Heart:  Regular  rhythm,  No murmur, No Rubs, No Gallops  Abdomen: Soft, Non distended, Non tender.  +Bowel sounds,   Extremities: No c/c. Left foot-wrapped. Psych:   Not anxious or agitated. Neurologic:  No acute neurological deficit. Labs: Results:       Chemistry Recent Labs      08/01/17   0302  07/31/17   1000  07/31/17   0306   GLU  208*  265*  257*   NA  135*  134*  132*   K  3.9  4.4  3.7   CL  101  96*  98*   CO2  27  26  25   BUN  13  11  10   CREA  0.91  1.01  0.92   CA  8.4*  8.2*  8.4*   AGAP  7  12  9   BUCR  14  11*  11*      CBC w/Diff Recent Labs      08/01/17   0302  07/31/17   0306  07/30/17   0235   WBC  11.8  11.8  11.8   RBC  4.17*  4.25*  4.13*   HGB  12.0*  12.4*  12.0*   HCT  35.5*  35.8*  35.1*   PLT  400  351  298   GRANS  67   --    --    LYMPH  19*   --    --    EOS  3   --    --       Cardiac Enzymes No results for input(s): CPK, CKND1, KARY in the last 72 hours. No lab exists for component: CKRMB, TROIP   Coagulation Recent Labs      07/31/17   1000   PTP  13.7   INR  1.1       Lipid Panel Lab Results   Component Value Date/Time    Cholesterol, total 148 07/30/2017 02:35 AM    HDL Cholesterol 26 07/30/2017 02:35 AM    LDL, calculated 104.6 07/30/2017 02:35 AM    VLDL, calculated 17.4 07/30/2017 02:35 AM    Triglyceride 87 07/30/2017 02:35 AM    CHOL/HDL Ratio 5.7 07/30/2017 02:35 AM      BNP No results for input(s): BNPP in the last 72 hours. Liver Enzymes No results for input(s): TP, ALB, TBIL, AP, SGOT, GPT in the last 72 hours.     No lab exists for component: DBIL   Thyroid Studies No results found for: T4, T3U, TSH, TSHEXT, TSHEXT     Procedures/imaging: see electronic medical records for all procedures/Xrays and details which were not copied into this note but were reviewed prior to creation of Soraida Notice, MD

## 2017-08-01 NOTE — OP NOTES
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 PROCEDURES PERFORMED  1. Moderate conscious sedation of 67 minutes. 2. Ultrasound-guided access of right common femoral artery. 3. Aortogram.  4. Second order catheterization and selective left lower extremity  angiogram.  5. Atherectomy and balloon angioplasty of the anterior tibial artery and  peroneal artery. 6. Intra-arterial placement of nitroglycerin 1600 mg.  6. Arterial closure with Angio-Seal.    CULTURES: None. SPECIMENS REMOVED: None. DRAINS: None. ESTIMATED BLOOD LOSS: Less than 50 mL. ANESTHESIA: Moderate conscious sedation of 67 minutes. This was  provided by an independent provider given the medications per my  discretion monitoring the vital signs throughout the case. OPERATIVE FINDINGS  1. Aorta is patent, but does have what appears to be a 40% to 50%  narrowing within the midportion. 2. Bilateral renal arteries are patent without stenosis. 3. Bilateral common iliac arteries are patent but both have about 20%  narrowing within them. 4. Bilateral internal iliac arteries are patent although small intermittent  in size. 5. Bilateral external iliac arteries are patent without stenosis. LEFT LOWER EXTREMITY  1. Common femoral artery is patent without stenosis. 2. Profunda femoris artery is patent without stenosis. 3. Superficial femoral artery is patent. There are areas of minimal  disease anywhere between 10% 30% depending on where it is on the  artery. 4. Above-knee popliteal artery is patent without stenosis. 5. Below-knee popliteal artery is patent without stenosis.   6. Anterior tibial artery is patent. Does have areas of significant  disease as high as 80% throughout the artery, is the dominant vessel  going to the foot. 7. Tibioperoneal trunk artery is patent but does show some 50%  narrowing within its midportion. 8. The peroneal artery is patent but does show disease throughout the  proximal portion as high as 70% within the midportion as high as 50%  and is the vessel that goes to most of the plantar artery, but does kind  of occlude in the distal ankle with a large collateral going onto the foot. 9. The posterior tibial artery is a small to medium in size and has an  occlusions that skip around throughout it. DESCRIPTION OF PROCEDURE: The patient was correctly identified  in the precath area and taken to the interventional area in stable  condition. The patient had pre-incision time-out prior to incision. The  patient was prepped and draped in a normal sterile fashion according  to [facility] guidelines aseptic technique. We then were able to use an  ultrasound to visualize the right common femoral artery. A picture was  taken and kept with the patient's chart. We numbed him up  appropriately using 1% lidocaine, took a single wall entry  micropuncture needle and gain access into the artery. Once the needle  tip was identified within the artery and there was positive blood return,  a Mandril wire was then placed. A small skin incision was created  using an 11 blade and a 4-Tamazight sheath was then placed. We then  were able to place a Bentson wire in and ContraFlush catheter was  then placed and aortogram was performed. With the above findings,  we then were able to cross over the aortic bifurcation with ContraFlush  catheter and Bentson wire. With the ContraFlush catheter within the  left external iliac artery, a selective left lower extremity angiogram was  performed. Once that was done, we saw the above findings. It was  decided to move forward with the procedure.  A Glidewire Advantage  wire was then placed down into the popliteal artery, upsized to a 6-  [place] Smiley Raabe sheath 90 cm. Heparinized the patient appropriately and  then we were able to gain access into the anterior tibial artery with  Quick-Cross catheter and a Glidewire advantage wire. Viper wire was  then placed. Atherectomy was performed using 1.25 mm stealth crown  device and balloon angioplasty with a 3 mm balloon. The patient had a  good flow afterwards. No distal embolization, but does have some slow  flow. Nitroglycerin was given later. We then were able to gain access  into the peroneal as above and perform atherectomy of the  tibioperoneal trunk artery as well as the peroneal artery and post-  dilated with a 3 mm balloon. Repeat angiography again shows a slow  flow. A total of 1600 mg of nitroglycerin were given and had some  improvement of flow into the dorsalis pedis, but really slow flow within  the peroneal artery, but no abrupt occlusion that could be indicative of  embolization. We then went ahead and decided to conclude the case. A Bentson wire was then placed and we shot a dedicated right  showing good stick for Angio-Seal deployment. A 6-Frisian Angio-Seal  was deployed with 2 minutes of manual compression and good  hemostasis. The patient tolerated the procedure well without any issues. RECOMMENDATIONS: The patient does have very poor blood flow  going into his foot. There are very few metatarsal bones. The patient  would be a very high risk of below-the-knee amputation given the fact  that most of his arteries are patent more proximally, but seem to  occlude near the ankle level and a very low flow actually getting into  the foot itself. We will have to see how he heals after his toe  amputations to determine if he would need below-the-knee amputation  or continue to have good wound care.         MD IQAR Hull / Alesia Cadena  D:  08/01/2017   16:14  T:  08/01/2017   18:33  Job #:  [id number]

## 2017-08-01 NOTE — PROGRESS NOTES
Atherectomy to ARIANNA, TP trunk, and peroneal arteries. Has some slow flow but overall much improved to ankle. Major issue is that he has very small arteries in foot and little flow going to metatarsal areas. Hopefully will continue to improve with procedure. But if toe amps do not heal will need BKA. PTA is small and diffusely diseased problematic for any TMA but will need close monitoring of foot for determination.

## 2017-08-01 NOTE — ROUTINE PROCESS
Bedside and Verbal shift change report given to Jammie Eller RN (oncoming nurse) by Sanjay Paul RN (offgoing nurse). Report included the following information SBAR, Kardex, ED Summary, Procedure Summary, Intake/Output, MAR, Recent Results and Med Rec Status.

## 2017-08-01 NOTE — ROUTINE PROCESS
Bedside, Verbal and Written shift change report given to Maral Chao RN (oncoming nurse) by Paras Doran RN   (offgoing nurse). Report included the following information SBAR, Kardex, Procedure Summary, Intake/Output, MAR, Recent Results and Med Rec Status.

## 2017-08-01 NOTE — INTERVAL H&P NOTE
H&P Update:  Kaur Chavez was seen and examined. History and physical has been reviewed. The patient has been examined.  There have been no significant clinical changes since the completion of the originally dated History and Physical.    Signed By: Juarez Manning MD     August 1, 2017 2:32 PM

## 2017-08-01 NOTE — PROGRESS NOTES
Cm made another call to number provided regarding workman's comp [phone] left voice message with cm contact,waiting on return call

## 2017-08-01 NOTE — PROGRESS NOTES
TRANSFER - OUT REPORT:    Verbal report given to MICHAEL Martines(name) on [place]  being transferred to Care Unit(unit) for routine progression of care       Report consisted of patients Situation, Background, Assessment and   Recommendations(SBAR). Information from the following report(s) SBAR, Kardex and MAR was reviewed with the receiving nurse. Lines:   Peripheral IV 07/26/17 Right Forearm (Active)   Site Assessment Clean, dry, & intact 8/1/2017  2:20 AM   Phlebitis Assessment 0 8/1/2017  2:20 AM   Infiltration Assessment 0 8/1/2017  2:20 AM   Dressing Status Clean, dry, & intact 8/1/2017  2:20 AM   Dressing Type Transparent 8/1/2017  2:20 AM   Hub Color/Line Status Blue; Infusing 8/1/2017  2:20 AM   Action Taken Open ports on tubing capped 7/31/2017  8:20 AM   Alcohol Cap Used Yes 8/1/2017  2:20 AM        Opportunity for questions and clarification was provided.       Patient transported with:   Registered Nurse  Tech

## 2017-08-01 NOTE — PROGRESS NOTES
1610-received report from [place] [id number] included SBAR MAR and Kardex. Off the floor for Angio to L Foot. 1655-received report from [address] [place] [address] included SBAR MAR and Kardex. 1710-pt back in room rt femoral area soft and flat. Pt on BR until 6PM must lay flat. 1815-received critical lab value Vancomycin level 21.1 from [place] in lab. Dr Yaw Joseph notified. Bedside and Verbal shift change report given to Kike Hernandez RN (oncoming nurse) by Rose Marie Darnell RN (offgoing nurse). Report included the following information SBAR, Kardex and MAR.

## 2017-08-01 NOTE — PROGRESS NOTES
Pt transported to room 310 by 2 nurses,  1 on 1 site check done with receiving nurse,  Pt tolerate procedure well

## 2017-08-01 NOTE — ROUTINE PROCESS
Bedside and Verbal shift change report given to WILLIE Villa RN  (oncoming nurse) by  WILLIE Chirinos RN  (offgoing nurse). Report included the following information SBAR, Kardex, Recent Results and Med Rec Status.

## 2017-08-01 NOTE — PROGRESS NOTES
Problem: Mobility Impaired (Adult and Pediatric)  Goal: *Acute Goals and Plan of Care (Insert Text)  In 1-7 days pt will be able to perform:  ST. Bed mobility: Rolling L to R to L modified independent for positioning. 2. Supine to sit to supine S with HR for meals. 3. Sit to stand to sit S with RW in prep for ambulation. LT. Gait: Ambulate >150ft S with RW, L NWB, for home/community mobility. 2. Stair Negotiation: Ascend/descend >3 steps L NWB CGA with HRs for home entry. 3. Activity tolerance: Tolerate up in chair 1-2 hours for ADLs. 4. Patient/Family Education: Patient/family to be independent with HEP for follow-up care and safe discharge. 17 Updated goals to be met in 7 days:  ST. Bed mobility: Rolling L to R to L modified independent for positioning. 2. Supine to sit to supine S with HR for meals. 3. Sit to stand to sit S with RW in prep for ambulation. LT. Gait: Ambulate >150ft S with RW, L WBAT, for home/community mobility. 2. Stair Negotiation: Ascend/descend >3 steps L WBAT CGA with HRs for home entry. 3. Activity tolerance: Tolerate up in chair 1-2 hours for ADLs. 4. Patient/Family Education: Patient/family to be independent with HEP for follow-up care and safe discharge. PT session held due to:  [ ]  Nausea/vomiting  [ ]  RN Communication/ suggestion  [ ]  Extreme Pain  [X]  Pt is off the unit. Will f/u later. Thank you.   Dc Jimenez, PTA

## 2017-08-01 NOTE — PROGRESS NOTES
INITIAL NUTRITION ASSESSMENT     RECOMMENDATIONS/PLAN:   Advance diet per MD reccs  Add MVI to meet DRIs during wound heeling  Continue to monitor labs/lytes, weights, fluid status, skin integrity, PO Intakes      REASON FOR ASSESSMENT:     [x] LOS    NUTRITION ASSESSMENT:   Client History: 46 yrs old Male admitted with on 7/24/17 for left foot cellulitis after stepping on a nail 3 days prior to admission. Pt received angiogram to left toe on 8/1/17. Pt also noted to came in with hyperglycemia. Hg1A 11.2 per MD note pt was unaware of DM diagnosis. DM Educator saw pt on 7/27/17 for initial diet education, see notes. Per MD note pt also, came in w/ hyponatremia. Pt was made NPO last night for procedure today. Advance per MD recommendations. Lisa Dillard PMHx: N/A  Cultural/Restorationism Food Preferences: None Identified    FOOD/NUTRITION HISTORY  Diet History:  Unable to see pt at this time pt was not in his room  Food Allergies:  [x] NKFA     [] Yes   Pertinent PTA Medications:none     NUTRITION INTAKE   Diet Order:  NPO     Average PO Intake:       Patient Vitals for the past 100 hrs:   % Diet Eaten   08/01/17 1245 0 %   08/01/17 1200 0 %   08/01/17 0921 0 %   07/31/17 1200 100 %   07/31/17 0810 100 %   07/30/17 0600 0 %   07/29/17 1544 75 %   07/28/17 1333 100 %      Pertinent Medications:  [x] Reviewed; Lantus, Humalog, Vancomycin,       Insulin:  [x] SSI  [x] Pre-meal   []  Basal   [] Drip  [] None  Pt expected to meet estimated nutrient needs through next review:          [x]  Yes     [] No; when pt was on diet pt.  Averaged >75%; NPO status for procedure  ANTHROPOMETRICS  Height: 5' 10\" (177.8 cm)       Weight: 84.5 kg (186 lb 4.6 oz)    BMI: 26.7 kg/m^2  -  overweight (25.0%-29.9% BMI)        Weight change: Unable to see pt at this time pt was not in his room                                 Comparison to Reference Standards:  IBW: 182lbs      %IBW: 102%      AdjBW: 153kg    NUTRITION-FOCUSED PHYSICAL ASSESSMENT  Skin: left foot wound POA    GI: No BM's noted     BIOCHEMICAL DATA & MEDICAL TESTS  Pertinent Labs:  [x] Reviewed; Na-135    NUTRITION PRESCRIPTION  Calories: 2172 kcal/day based on [place] X 1.4  Protein:  g/day based on 1.3-1.5 g/kg  CHO: 272 g/day based on 50% of total energy  Fluid: 2172 ml/day based on 1 kcal/ml      NUTRITION DIAGNOSES:   1. Food and nutrition-related knowledge deficit related to DM diagnosis evidenced by dx of new-onset DM. NUTRITION INTERVENTIONS:   INTERVENTIONS:        GOALS:  1. Continue with POC 1. Meet PO Intake >75% by next review 3 days             LEARNING NEEDS (Diet, Supplementation, Food/Nutrient-Drug Interaction):   [x] None Identified  [] Inpatient education provided/documented    [] Identified and patient:  [] Declined     [] Was not appropriate/indicated  NUTRITION MONITORING /EVALUATION:   Follow PO intake  Monitor wt  Monitor renal labs, electrolytes, fluid status  Monitor for additional supplement needs    [] Participated in Interdisciplinary Rounds  [x] Interdisciplinary Care Plan Reviewed/Documented  DISCHARGE NUTRITION RECOMMENDATIONS ADDRESSED:         [x] To be determined closer to discharge    NUTRITION RISK:     [x]  At risk                     []  Not currently at risk     Will follow-up per policy.   Emmet Dakin, RD  PAGER:  [phone]

## 2017-08-01 NOTE — PROGRESS NOTES
TRANSFER - OUT REPORT:    Verbal report given to Nabil Russo RN(name) on [place]  being transferred to room 310(unit) for routine progression of care       Report consisted of patients Situation, Background, Assessment and   Recommendations(SBAR). Information from the following report(s) SBAR, Kardex and MAR was reviewed with the receiving nurse. Lines:   Peripheral IV 07/26/17 Right Forearm (Active)   Site Assessment Clean, dry, & intact 8/1/2017  2:20 AM   Phlebitis Assessment 0 8/1/2017  2:20 AM   Infiltration Assessment 0 8/1/2017  2:20 AM   Dressing Status Clean, dry, & intact 8/1/2017  2:20 AM   Dressing Type Transparent 8/1/2017  2:20 AM   Hub Color/Line Status Blue; Infusing 8/1/2017  2:20 AM   Action Taken Open ports on tubing capped 7/31/2017  8:20 AM   Alcohol Cap Used Yes 8/1/2017  2:20 AM        Opportunity for questions and clarification was provided.       Patient transported with:   Registered Nurse x 2   Hipolito Quiñonez RN and Elliot Baird RN
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
## 2017-08-01 NOTE — PROGRESS NOTES
Arrived to care unit via bed from angio for 45 minute recovery,  Pt aaox3, denies any pain or distress, dressing to right groin area clean, dry and intact, pt denies any pain or distress.  Oral h2o given and tolerated well

## 2017-08-02 PROBLEM — I73.9 PAD (PERIPHERAL ARTERY DISEASE) (HCC): Status: ACTIVE | Noted: 2017-01-01

## 2017-08-02 NOTE — PROGRESS NOTES
Problem: Mobility Impaired (Adult and Pediatric)  Goal: *Acute Goals and Plan of Care (Insert Text)  In 1-7 days pt will be able to perform:  ST. Bed mobility: Rolling L to R to L modified independent for positioning. 2. Supine to sit to supine S with HR for meals. 3. Sit to stand to sit S with RW in prep for ambulation. LT. Gait: Ambulate >150ft S with RW, L NWB, for home/community mobility. 2. Stair Negotiation: Ascend/descend >3 steps L NWB CGA with HRs for home entry. 3. Activity tolerance: Tolerate up in chair 1-2 hours for ADLs. 4. Patient/Family Education: Patient/family to be independent with HEP for follow-up care and safe discharge. 17 Updated goals to be met in 7 days:  ST. Bed mobility: Rolling L to R to L modified independent for positioning. 2. Supine to sit to supine S with HR for meals. 3. Sit to stand to sit S with RW in prep for ambulation. LT. Gait: Ambulate >150ft S with RW, L WBAT, for home/community mobility. 2. Stair Negotiation: Ascend/descend >3 steps L WBAT CGA with HRs for home entry. 3. Activity tolerance: Tolerate up in chair 1-2 hours for ADLs. 4. Patient/Family Education: Patient/family to be independent with HEP for follow-up care and safe discharge. Outcome: Progressing Towards Goal  PHYSICAL THERAPY TREATMENT     Patient: Kaur Chavez (23 y.o. male)  Date: 2017  Diagnosis: Cellulitis  CELLULITIS OF LEFT FOOT Cellulitis  Procedure(s) (LRB):  LEFT FOOT DEBRIDEMENT WITH C-ARM (Left) 6 Days Post-Op  Precautions: Fall, WBAT, Other (comment) (with ortho boot in place L foot)   Chart, physical therapy assessment, plan of care and goals were reviewed. ASSESSMENT:  Pt continues to be WBAT to L foot with ortho boot in place. Pt also continues to progress well, without LOB while ambulating. Balance slightly unsteady. Cont POC.    Progression toward goals:  [ ]      Improving appropriately and progressing toward goals  [X]      Improving slowly and progressing toward goals  [ ]      Not making progress toward goals and plan of care will be adjusted       PLAN:  Patient continues to benefit from skilled intervention to address the above impairments. Continue treatment per established plan of care. Discharge Recommendations:  Home Health  Further Equipment Recommendations for Discharge:  rolling walker       SUBJECTIVE:   Patient stated  Yes, let's go       OBJECTIVE DATA SUMMARY:   Critical Behavior:  Neurologic State: Alert  Orientation Level: Oriented X4  Cognition: Appropriate decision making, Appropriate safety awareness     Functional Mobility Training:  Bed Mobility:  Rolling: Modified independent  Supine to Sit: Modified independent  Sit to Supine: Modified independent  Scooting: Modified independent  Transfers:  Sit to Stand: Stand-by asssistance  Stand to Sit: Stand-by asssistance  Balance:  Sitting: Intact  Standing: Intact; With support  Ambulation/Gait Training:  Distance (ft): 185 Feet (ft)  Assistive Device: Walker, rolling;Gait belt  Ambulation - Level of Assistance: Contact guard assistance;Stand-by asssistance  Gait Abnormalities: Path deviations  Left Side Weight Bearing: As tolerated  Speed/Karlie: Slow  Interventions: Safety awareness training;Verbal cues     Pain:  Pain Scale 1: Numeric (0 - 10)  Pain Intensity 1: 0  Activity Tolerance:   Fair+     After treatment:   [ ] Patient left in no apparent distress sitting up in chair  [X] Patient left in no apparent distress in bed  [X] Call bell left within reach  [ ] Nursing notified  [X] Caregiver present  [ ] Bed alarm activated      Trae Garza PTA   Time Calculation: 16 mins

## 2017-08-02 NOTE — PROGRESS NOTES
Cm was able to contact [facility] [phone] informed cm that pt reported injury was not work related and patient has no medical insurance coverage with his job,patient has been seen last week by MAURICIO cm will update with new information provided,at this time  ordered podiatrist consult with .

## 2017-08-02 NOTE — PROGRESS NOTES
0735-received report from Sonia Frazier RN included SBAR MAR and Kardex. 0810-assessed pt resting comfortably in bed, no c/o L foot wrapped and in boot post tibial pulse weak thready. IV in progress. 1400-no change in pt status, ambulating in woods with PT.  1800-changed L foot dressing per MD order with packing. Bedside and Verbal shift change report given to Braydon Gama RN (oncoming nurse) by Dana Schneider RN (offgoing nurse). Report included the following information SBAR, Kardex and MAR.

## 2017-08-02 NOTE — PROGRESS NOTES
Pt is s/p endovascular intervention yesterday by Dr Fatuma Gongora, with atherectomy to ARIANNA, TP trunk, and peroneal arteries. Has some slow flow but overall much improved to ankle. Major issue is that he has very small arteries in foot and little flow going to metatarsal areas. Hopefully will continue to improve with procedure. But if toe amps do not heal will need BKA (and I did review these details with patient at bedside today). PTA is small and diffusely diseased problematic for any TMA but will need close monitoring of foot for determination. Of note is podiatrist who has followed him is now off  Unclear if any turnover to a covering podiatrist    Spoke with staff who will confer with attending physician to ensure podiatry follow up  Pt at least needs toe amps of 3rd and 4th toes. The 2nd toe also now appearing dusky from what I remember when I last saw him a few days ago.     Will continue to follow post podiatry procedure to determine if he has adequate perfusion for wound healing of toe amps, or if will need further discussion regarding higher amp

## 2017-08-02 NOTE — PROGRESS NOTES
Problem: Mobility Impaired (Adult and Pediatric)  Goal: *Acute Goals and Plan of Care (Insert Text)  In 1-7 days pt will be able to perform:  ST. Bed mobility: Rolling L to R to L modified independent for positioning. 2. Supine to sit to supine S with HR for meals. 3. Sit to stand to sit S with RW in prep for ambulation. LT. Gait: Ambulate >150ft S with RW, L NWB, for home/community mobility. 2. Stair Negotiation: Ascend/descend >3 steps L NWB CGA with HRs for home entry. 3. Activity tolerance: Tolerate up in chair 1-2 hours for ADLs. 4. Patient/Family Education: Patient/family to be independent with HEP for follow-up care and safe discharge. 17 Updated goals to be met in 7 days:  ST. Bed mobility: Rolling L to R to L modified independent for positioning. 2. Supine to sit to supine S with HR for meals. 3. Sit to stand to sit S with RW in prep for ambulation. LT. Gait: Ambulate >150ft S with RW, L WBAT, for home/community mobility. 2. Stair Negotiation: Ascend/descend >3 steps L WBAT CGA with HRs for home entry. 3. Activity tolerance: Tolerate up in chair 1-2 hours for ADLs. 4. Patient/Family Education: Patient/family to be independent with HEP for follow-up care and safe discharge. Outcome: Progressing Towards Goal  PHYSICAL THERAPY TREATMENT     Patient: Talisha Walters (3 y.o. male)  Date: 2017  Diagnosis: Cellulitis  CELLULITIS OF LEFT FOOT Cellulitis  Procedure(s) (LRB):  LEFT FOOT DEBRIDEMENT WITH C-ARM (Left) 6 Days Post-Op  Precautions: Fall, WBAT, Other (comment) (with ortho boot in place L foot)   Chart, physical therapy assessment, plan of care and goals were reviewed.       ASSESSMENT:  Progression toward goals:  [ ]      Improving appropriately and progressing toward goals  [X]      Improving slowly and progressing toward goals  [ ]      Not making progress toward goals and plan of care will be adjusted       PLAN:  Patient continues to benefit from skilled intervention to address the above impairments. Continue treatment per established plan of care. Discharge Recommendations:  Home Health  Further Equipment Recommendations for Discharge:  rolling walker       SUBJECTIVE:   Patient stated  walk 1st      OBJECTIVE DATA SUMMARY:   Critical Behavior:  Neurologic State: Alert  Orientation Level: Oriented X4  Cognition: Appropriate decision making, Appropriate safety awareness     Functional Mobility Training:  Bed Mobility:  Rolling: Modified independent  Supine to Sit: Modified independent  Sit to Supine: Modified independent  Scooting: Modified independent  Transfers:  Sit to Stand: Stand-by asssistance  Stand to Sit: Stand-by asssistance  Balance:  Sitting: Intact  Standing: Intact; With support  Ambulation/Gait Training:  Distance (ft): 185 Feet (ft)  Assistive Device: Walker, rolling;Gait belt  Ambulation - Level of Assistance: Stand-by asssistance;Contact guard assistance  Gait Abnormalities: Path deviations  Left Side Weight Bearing: As tolerated  Speed/Karlie: Slow  Interventions: Verbal cues; Safety awareness training     Pain:  Pain Scale 1: Numeric (0 - 10)  Pain Intensity 1: 0  Activity Tolerance:   Good      After treatment:   [ ] Patient left in no apparent distress sitting up in chair  [X] Patient left in no apparent distress in bed(EOB)   [X] Call bell left within reach  [ ] Nursing notified  [ ] Caregiver present  [ ] Bed alarm activated      Jessica Castillo PTA   Time Calculation: 13 mins

## 2017-08-02 NOTE — PROGRESS NOTES
Patient was visited by AMARJIT. Volunteer followed up with patient and/or family and reported no needs to this . Chaplains will continue to follow and will provide pastoral care as needed or requested. Rev.  [address] [facility]  [phone]

## 2017-08-02 NOTE — ROUTINE PROCESS
Bedside and Verbal shift change report given to Wanda Roland RN (oncoming nurse) by Dilip Contreras RN (offgoing nurse). Report included the following information SBAR, Kardex and MAR. Patient had no acute events overnight. Currently resting in NAD. No express needs reported.

## 2017-08-02 NOTE — PROGRESS NOTES
Hospitalist Progress Note-critical care note     Patient: Clara Salcedo MRN: [id number]  CSN: [id number]    YOB: 1965  Age: 46 y.o. Sex: male    DOA: 7/24/2017 LOS:  LOS: 9 days            Chief complaint: cellulitis, om,     Assessment/Plan         Hospital Problems  Never Reviewed          Codes Class Noted POA    Osteomyelitis of left foot ([facility] ICD-10-CM: M86.9  ICD-9-CM: 730.27  8/1/2017 Unknown        Blind left eye ICD-10-CM: H54.42  ICD-9-CM: 369.60  7/31/2017 Unknown        * (Principal)Cellulitis ICD-10-CM: L03.90  ICD-9-CM: 682.9  7/24/2017 Unknown        Hyperglycemia due to type 2 diabetes mellitus ([facility] ICD-10-CM: E11.65  ICD-9-CM: 250.00  7/24/2017 Unknown        Hyponatremia ICD-10-CM: E87.1  ICD-9-CM: 276.1  7/24/2017 Unknown              1. Cellulitis L foot and Osteomyelitis L foot. podiatry and vascular on board  Will continue abx   Dr. Althea Negron will off the call schedule , case discussed with Dr. Greta Reece, toe need to be amputated   2. PAD  Vascular on board :Atherectomy and balloon angioplasty of the anterior tibial artery and  peroneal artery. Intra-arterial placement of nitroglycerin 1600 mg    3. DM2 uncontrolled. a1c 11.2  Uncontrolled   Will continue Lantus, premeal and ssi   Diabetic educator on board      4 Hyponatremia  Mild improving. Continue ns and continue monitor    5 left eye blindness   Fall precaution     Subjective: feel fine   Nurse: no acute issue       Review of systems:    General: No fevers or chills. Cardiovascular: No chest pain or pressure. No palpitations. Pulmonary: No shortness of breath. Gastrointestinal: No nausea, vomiting.      Vital signs/Intake and Output:  Visit Vitals    /70 (BP 1 Location: Right arm, BP Patient Position: At rest)    Pulse 84    Temp 98.2 °F (36.8 °C)    Resp 18    Ht 5' 10\" (1.778 m)    Wt 86.9 kg (191 lb 9.3 oz)    SpO2 98%    BMI 27.49 kg/m2     Current Shift:     Last three shifts:  07/31 1901 - 08/02 0700  In: 4863 [I.V.:1445]  Out: 2800 [Urine:2800]    Physical Exam:  General: WD, WN. Alert, cooperative, no acute distress    HEENT: NC, Atraumatic. Rt : PERRLA, anicteric sclerae. Left eye blindness   Lungs: CTA Bilaterally. No Wheezing/Rhonchi/Rales. Heart:  Regular  rhythm,  No murmur, No Rubs, No Gallops  Abdomen: Soft, Non distended, Non tender.  +Bowel sounds,   Extremities: No c/c. Left foot-wrapped. Dusky of toes -left foot   Psych:   Not anxious or agitated. Neurologic:  No acute neurological deficit. Labs: Results:       Chemistry Recent Labs      08/02/17 0335 08/01/17   0302 07/31/17   1000   GLU  178*  208*  265*   NA  134*  135*  134*   K  4.0  3.9  4.4   CL  98*  101  96*   CO2  22  27  26   BUN  9  13  11   CREA  0.94  0.91  1.01   CA  8.2*  8.4*  8.2*   AGAP  14  7  12   BUCR  10*  14  11*      CBC w/Diff Recent Labs      08/02/17   0335 08/01/17   0302  07/31/17   0306   WBC  11.4  11.8  11.8   RBC  4.00*  4.17*  4.25*   HGB  11.7*  12.0*  12.4*   HCT  34.0*  35.5*  35.8*   PLT  362  400  351   GRANS  69  67   --    LYMPH  18*  19*   --    EOS  2  3   --       Cardiac Enzymes No results for input(s): CPK, CKND1, KARY in the last 72 hours. No lab exists for component: CKRMB, TROIP   Coagulation Recent Labs      07/31/17   1000   PTP  13.7   INR  1.1       Lipid Panel Lab Results   Component Value Date/Time    Cholesterol, total 148 07/30/2017 02:35 AM    HDL Cholesterol 26 07/30/2017 02:35 AM    LDL, calculated 104.6 07/30/2017 02:35 AM    VLDL, calculated 17.4 07/30/2017 02:35 AM    Triglyceride 87 07/30/2017 02:35 AM    CHOL/HDL Ratio 5.7 07/30/2017 02:35 AM      BNP No results for input(s): BNPP in the last 72 hours. Liver Enzymes No results for input(s): TP, ALB, TBIL, AP, SGOT, GPT in the last 72 hours.     No lab exists for component: DBIL   Thyroid Studies No results found for: T4, T3U, TSH, TSHEXT, TSHEXT     Procedures/imaging: see electronic medical records for all procedures/Xrays and details which were not copied into this note but were reviewed prior to creation of Radha Mosquera MD

## 2017-08-02 NOTE — DIABETES MGMT
GLYCEMIC CONTROL PROGRESS NOTE:    -discussed in rounds, new diagnosis of DM, most likely Type 2  -POCT + basal + mealtime + corrective coverage orders in place  -TDD = 52 (40 lantus + 10 mealtime + 2 corrective coverage) pt npo for procedure 8/1  -glycemic control progressing, pt not requiring any corrective coverage so far today  Recommendation:    *monitor inpatient insulin needs & discharge pt on basal + oral home regimen    Recent Glucose Results:   Lab Results   Component Value Date/Time     (H) 08/02/2017 03:35 AM    GLUCPOC 149 (H) 08/02/2017 11:26 AM    GLUCPOC 123 (H) 08/02/2017 07:28 AM    GLUCPOC 166 (H) 08/01/2017 09:14 PM             Faisal Pete RN, MS  Glycemic Control Team

## 2017-08-02 NOTE — CONSULTS
46  y.o. [facility] male seen today per request by Dr. Lashae Waller. Confirmed phone consult request with Dr. Paulette Kline.  Pt seen at bedside. Speaks some English. Family lives in [place]. Stated that they will return tomorrow. Gives history of stepping on a nail while at work. Wearing boots. Waited 3 days  before presenting to ED. Stated that he was up to date on Td  Immunization. HX of smoking  greater than 20 yrs.  + DM. V)  LT foot:  Unable to detect due to heavy bandage. 3rd toe is black and firm to touch. 2nd toe, dusky reddish to gray discoloration. D)  Ulcer on the  LT plantar foot is observed. Wound packing is noted. Mild malodor. There is an ulceration in the 3rd toe sulcus. Betadine soaked dressing is observed. Assessment:  LT foot, gangrene,  PVD, DM, hx smoking greater than 20  Yrs. Puncture wound, LT plantar foot. Osteomyelitis. Plan:  Chart reviewed. Continue  IV AB. LT wound dressing:  Betadine soaked gauze, to change every other day. Vascular report read. LT LE  RAFAEL: 66.  Recommend toe and/or  TMA  LT foot. Thank you for this consultation. Addendum:  I am   on call this week, however, not credentialed to do surgeries. Please call Alexia Rayo. I have spoken to him about this pt. Thank you. Leandro Johnson

## 2017-08-03 PROBLEM — I96 GANGRENE OF TOE (HCC): Status: ACTIVE | Noted: 2017-01-01

## 2017-08-03 NOTE — PROGRESS NOTES
Problem: Mobility Impaired (Adult and Pediatric)  Goal: *Acute Goals and Plan of Care (Insert Text)  In 1-7 days pt will be able to perform:  ST. Bed mobility: Rolling L to R to L modified independent for positioning. 2. Supine to sit to supine S with HR for meals. 3. Sit to stand to sit S with RW in prep for ambulation. LT. Gait: Ambulate >150ft S with RW, L NWB, for home/community mobility. 2. Stair Negotiation: Ascend/descend >3 steps L NWB CGA with HRs for home entry. 3. Activity tolerance: Tolerate up in chair 1-2 hours for ADLs. 4. Patient/Family Education: Patient/family to be independent with HEP for follow-up care and safe discharge. 17 Updated goals to be met in 7 days:  ST. Bed mobility: Rolling L to R to L modified independent for positioning. 2. Supine to sit to supine S with HR for meals. 3. Sit to stand to sit S with RW in prep for ambulation. LT. Gait: Ambulate >150ft S with RW, L WBAT, for home/community mobility. 2. Stair Negotiation: Ascend/descend >3 steps L WBAT CGA with HRs for home entry. 3. Activity tolerance: Tolerate up in chair 1-2 hours for ADLs. 4. Patient/Family Education: Patient/family to be independent with HEP for follow-up care and safe discharge. Outcome: Progressing Towards Goal  PHYSICAL THERAPY TREATMENT     Patient: Luz Alamo (80 y.o. male)  Date: 8/3/2017  Diagnosis: Cellulitis  CELLULITIS OF LEFT FOOT Cellulitis  Procedure(s) (LRB):  LEFT FOOT DEBRIDEMENT WITH C-ARM (Left) 7 Days Post-Op  Precautions: Fall, WBAT, Other (comment) (with ortho boot in place L foot)   Chart, physical therapy assessment, plan of care and goals were reviewed. ASSESSMENT:  Pt is very mobile in current situation and is safe to mobilize with NSG. Pt refers to Saturday, as upcoming date for amputation. Will likely have a decrease of WB precaution.   Progression toward goals:  [ ]      Improving appropriately and progressing toward goals  [ ] Improving slowly and progressing toward goals  [ ]      Not making progress toward goals and plan of care will be adjusted       PLAN:  Patient continues to benefit from skilled intervention to address the above impairments. Continue treatment per established plan of care. Discharge Recommendations: To Be Determined  Further Equipment Recommendations for Discharge:  bedside commode and rolling walker       SUBJECTIVE:   Patient stated I need to walk.       OBJECTIVE DATA SUMMARY:   Critical Behavior:  Neurologic State: Alert, Appropriate for age  Orientation Level: Oriented X4  Cognition: Appropriate decision making, Appropriate for age attention/concentration, Appropriate safety awareness, Follows commands  Functional Mobility Training:  Bed Mobility:  Rolling: Modified independent  Supine to Sit: Modified independent  Sit to Supine: Modified independent  Scooting: Modified independent  Transfers:  Sit to Stand: Modified independent  Stand to Sit: Modified independent  Balance:  Sitting: Intact  Standing: Intact; With support  Ambulation/Gait Training:  Distance (ft): 350 Feet (ft)  Assistive Device: Walker, rolling;Gait belt  Ambulation - Level of Assistance: Stand-by asssistance;Contact guard assistance  Gait Abnormalities: Path deviations  Left Side Weight Bearing: As tolerated  Speed/Karlie: Slow  Interventions: Verbal cues; Safety awareness training  Pain:  Pain Scale 1: Numeric (0 - 10)  Pain Intensity 1: 0  Activity Tolerance:   Fair  Please refer to the flowsheet for vital signs taken during this treatment.   After treatment:   [ ] Patient left in no apparent distress sitting up in chair  [X] Patient left in no apparent distress in bed  [X] Call bell left within reach  [X] Nursing notified  [ ] Caregiver present  [ ] Bed alarm activated      Anna An PTA   Time Calculation: 32 mins

## 2017-08-03 NOTE — PROGRESS NOTES
CM following for needs once pt becomes more medically stable. Pt is not a workers comp related injury and has no additional insurance coverage. As a self pay his options will be limited at time of dc. CM remains available for assistance. [facility] has been asked to see patient again to determine if any assistance can be initiated. Met with patient during IDR's, Still waiting on decision for his procedure. Care Management Interventions  Mode of Transport at Discharge: Other (see comment) (Friend)  Transition of Care Consult (CM Consult): Discharge Planning  Health Maintenance Reviewed: Yes  Current Support Network: Other  Confirm Follow Up Transport: Self  Plan discussed with Pt/Family/Caregiver:  Yes

## 2017-08-03 NOTE — PROGRESS NOTES
Patient was visited by AMARJIT. Volunteer followed up with patient and/or family and reported no needs to this . Chaplains will continue to follow and will provide pastoral care as needed or requested. Rev.  [address] [facility]  [phone]

## 2017-08-03 NOTE — PROGRESS NOTES
Pharmacy Dosing Services: Vancomycin    Indication: Osteomyelitis/Cellulitis  Day of treatment: 11    Recent Labs      08/03/17   0210  08/02/17   0335  08/01/17   0302   WBC  10.2  11.4  11.8   CREA  0.84  0.94  0.91   BUN  10  9  13     Estimated Creatinine Clearance: 105 mL/min (based on Cr of 0.84). Temp: 98.5 °F (36.9 °C)    Lab Results   Component Value Date/Time    Vancomycin,trough 13.2 08/03/2017 12:54 PM        Current Antibiotics:   Current Antimicrobial Therapy (168h ago through future)      Ordered     Start Stop      07/24/17 1703  levoFLOXacin (LEVAQUIN) 750 mg in D5W IVPB  750 mg,   IntraVENous,   EVERY 24 HOURS      07/24/17 1704 --          Assessment/Plan  · Vanc trough = 13.2 mcg/mL (sub therapeutic)  · Dose increased to Vancomycin 1250 mg IV q 8h  · Pharmacy to follow daily and will make changes to dose and/or frequency based on clinical status.     Pharmacist Ivelisse Bourne, [address]

## 2017-08-03 NOTE — ROUTINE PROCESS
Bedside shift change report given to Celine Moon RN (oncoming nurse) by Ziggy Irby RN (offgoing nurse). Report included the following information SBAR, Kardex, MAR, Recent Results and Alarm Parameters .

## 2017-08-03 NOTE — PROGRESS NOTES
Hospitalist Progress Note-critical care note     Patient: Sariah Malik MRN: [id number]  CSN: [id number]    YOB: 1965  Age: 46 y.o. Sex: male    DOA: 7/24/2017 LOS:  LOS: 10 days            Chief complaint: cellulitis, om,     Assessment/Plan         Hospital Problems  Never Reviewed          Codes Class Noted POA    PAD (peripheral artery disease) ([facility] ICD-10-CM: I73.9  ICD-9-CM: 443.9  8/2/2017 Unknown        Osteomyelitis of left foot ([facility] ICD-10-CM: M86.9  ICD-9-CM: 730.27  8/1/2017 Unknown        Blind left eye ICD-10-CM: H54.42  ICD-9-CM: 369.60  7/31/2017 Unknown        * (Principal)Cellulitis ICD-10-CM: L03.90  ICD-9-CM: 682.9  7/24/2017 Unknown        Hyperglycemia due to type 2 diabetes mellitus ([facility] ICD-10-CM: E11.65  ICD-9-CM: 250.00  7/24/2017 Unknown        Hyponatremia ICD-10-CM: E87.1  ICD-9-CM: 276.1  7/24/2017 Unknown              1. Cellulitis L foot and Osteomyelitis L foot. podiatry and vascular on board  continue abx   Dr. Joel Mitchell will off the call schedule , case discussed with Dr. Scottie Canseco yesterday , toe need to be amputated -she deferred the case to Dr. Capri Galeana -case discussed with Dr. Capri Galeana, the earliest time he can do the surgery will be Saturday afternoon and he will discussed with Dr. Scottie Canseco again in case pt need urgent surgery. 2.PAD  Vascular on board :Atherectomy and balloon angioplasty of the anterior tibial artery and  peroneal artery. Intra-arterial placement of nitroglycerin 1600 mg    3. DM2 uncontrolled. a1c 11.2  Uncontrolled   Will continue Lantus, premeal and ssi   Diabetic educator on board      4 Hyponatremia  Mild improving. Continue ns and continue monitor    5 left eye blindness   Fall precaution     Subjective: feel ok   Nurse: no acute issue       Review of systems:    General: No fevers or chills. Cardiovascular: No chest pain or pressure. No palpitations. Pulmonary: No shortness of breath. Gastrointestinal: No nausea, vomiting.      Vital signs/Intake and Output:  Visit Vitals    /77 (BP 1 Location: Right arm, BP Patient Position: At rest)    Pulse 85    Temp 98.4 °F (36.9 °C)    Resp 18    Ht 5' 10\" (1.778 m)    Wt 87 kg (191 lb 12.8 oz)    SpO2 99%    BMI 27.52 kg/m2     Current Shift:  08/03 0701 - 08/03 1900  In: 240 [P.O.:240]  Out: -   Last three shifts:  08/01 1901 - 08/03 0700  In: 1948.8 [P.O.:150; I.V.:1798.8]  Out: 4281 [Urine:2591]    Physical Exam:  General: WD, WN. Alert, cooperative, no acute distress    HEENT: NC, Atraumatic. Rt : PERRLA, anicteric sclerae. Left eye blindness   Lungs: CTA Bilaterally. No Wheezing/Rhonchi/Rales. Heart:  Regular  rhythm,  No murmur, No Rubs, No Gallops  Abdomen: Soft, Non distended, Non tender.  +Bowel sounds,   Extremities: No c/c. Left foot-wrapped. Dusky of toes -left foot   Psych:   Not anxious or agitated. Neurologic:  No acute neurological deficit. Labs: Results:       Chemistry Recent Labs      08/03/17 0210 08/02/17   0335 08/01/17   0302   GLU  158*  178*  208*   NA  134*  134*  135*   K  3.7  4.0  3.9   CL  99*  98*  101   CO2  26  22  27   BUN  10  9  13   CREA  0.84  0.94  0.91   CA  8.5  8.2*  8.4*   AGAP  9  14  7   BUCR  12  10*  14      CBC w/Diff Recent Labs      08/03/17 0210 08/02/17   0335  08/01/17   0302   WBC  10.2  11.4  11.8   RBC  4.08*  4.00*  4.17*   HGB  11.7*  11.7*  12.0*   HCT  34.5*  34.0*  35.5*   PLT  372  362  400   GRANS  68  69  67   LYMPH  17*  18*  19*   EOS  3  2  3      Cardiac Enzymes No results for input(s): CPK, CKND1, KARY in the last 72 hours. No lab exists for component: CKRMB, TROIP   Coagulation No results for input(s): PTP, INR, APTT in the last 72 hours.     No lab exists for component: INREXT, INREXT    Lipid Panel Lab Results   Component Value Date/Time    Cholesterol, total 148 07/30/2017 02:35 AM    HDL Cholesterol 26 07/30/2017 02:35 AM    LDL, calculated 104.6 07/30/2017 02:35 AM    VLDL, calculated 17.4 07/30/2017 02:35 AM    Triglyceride 87 07/30/2017 02:35 AM    CHOL/HDL Ratio 5.7 07/30/2017 02:35 AM      BNP No results for input(s): BNPP in the last 72 hours. Liver Enzymes No results for input(s): TP, ALB, TBIL, AP, SGOT, GPT in the last 72 hours.     No lab exists for component: DBIL   Thyroid Studies No results found for: T4, T3U, TSH, TSHEXT, TSHEXT     Procedures/imaging: see electronic medical records for all procedures/Xrays and details which were not copied into this note but were reviewed prior to creation of Soraida Harris MD

## 2017-08-03 NOTE — DIABETES MGMT
GLYCEMIC CONTROL PROGRESS NOTE:    -discussed in rounds, new diagnosis of DM most likely Type 2  -both FBG + PPG continue to be out of target range non-ICU: <140 mg/dL  -POCT + basal + mealtime + corrective coverage orders in place  -box of cookies on bedside table noted during rounds  -TDD 74 (40 Lantus + 30 Humalog + 4 corrective coverage)    Recommendations:   *monitor BG trends overnight & make adjustments as needed      Recent Glucose Results:   Lab Results   Component Value Date/Time     (H) 08/03/2017 02:10 AM    GLUCPOC 240 (H) 08/03/2017 11:12 AM    GLUCPOC 160 (H) 08/03/2017 07:42 AM    GLUCPOC 118 (H) 08/02/2017 10:22 PM             Abril Lyons RN, MS  Glycemic Control Team

## 2017-08-03 NOTE — PROGRESS NOTES
Shift Summary- Pt had na uneventful shift. Medicated for pain one time. Dressing to left foot remains CDI.       Patient Vitals for the past 12 hrs:   Temp Pulse Resp BP SpO2   08/03/17 0352 98.4 °F (36.9 °C) 84 18 113/68 100 %   08/02/17 2331 97.9 °F (36.6 °C) 86 18 134/63 96 %   08/02/17 2007 99.4 °F (37.4 °C) 92 18 130/76 97 %

## 2017-08-04 NOTE — PROGRESS NOTES
NUTRITION FOLLOW-UP    RECOMMENDATIONS/PLAN:   Continue w/ POC  Monitor labs/lytes, weight, fluid status, skin integrity     NUTRITION ASSESSMENT:   Client Update: 46 yrs old Male with 7/24/17 for left foot cellulitis after stepping on a nail 3 days prior to admission. Pt received angiogram to left toe on 8/1/17. Pt is schedule for amputation of toes on 7/5/17. Pt also noted to came in with hyperglycemia. Hg1A 11.2 per MD note pt was unaware of DM diagnosis. DM Educator saw pt on 7/27/17 for initial diet education, see notes. Saw pt today with help of . Pt reported to  that his appetite was very good at home and continues to be good here. Pt eats what \"they give him\" at the hospital based on his diet. FOOD/NUTRITION INTAKE   Diet Order:  Jeremi 1800, Reg  Supplements: n/a  Food Allergies: NKFA/  Average PO Intake:      Patient Vitals for the past 100 hrs:   % Diet Eaten   08/04/17 0934 75 %   08/03/17 0832 100 %   08/02/17 1900 40 %   08/01/17 1245 0 %   08/01/17 1200 0 %   08/01/17 0921 0 %   07/31/17 1200 100 %   07/31/17 0810 100 %      Pertinent Medications:  [x] Reviewed; Plavis, Lovenox, lantus, humalog, MVI, zofran, vancomycin     Insulin:  [x]SSI  [x]Pre-meal   []Basal    []Drip  []None  Cultural/[employer] Food Preferences: None Identified    BIOCHEMICAL DATA & MEDICAL TESTS  Pertinent Labs:  [x] Reviewed; ANTHROPOMETRICS  Height: 5' 10\" (177.8 cm)       Weight: 87 kg (191 lb 12.8 oz)         BMI: 27.5 kg/m^2 overweight (25.0%-29.9% BMI)   Adm Weight: 190 lbs                Weight change: +1lb since admission   Adjusted Body Weight: 138lbs      NUTRITION-FOCUSED PHYSICAL ASSESSMENT  Skin: No pressure injury noted  GI: No BM noted    NUTRITION PRESCRIPTION  Calories: 2172 kcal/day based on [place] X 1.4  Protein:  g/day based on 1.3-1.5 g/kg  CHO: 272 g/day based on 50% of total energy  Fluid: 2172 ml/day based on 1 kcal/ml                   NUTRITION DIAGNOSES:   1.  Food and nutrition-related knowledge deficit related to DM diagnosis evidenced by dx of new-onset DM*    NUTRITION INTERVENTIONS:   INTERVENTIONS:        GOALS:  Continue w/ POC 1. Meet PO Intake >75% by next review 3 days             LEARNING NEEDS (Diet, Supplementation, Food/Nutrient-Drug Interaction):   [x] None Identified   [] Education provided/documented      Identified and patient: [] Declined   [] Was not appropriate/indicated        NUTRITION MONITORING /EVALUATION:   Follow PO intake  Monitor wt  Monitor renal labs, electrolytes, fluid status     Previous Recommendations Implemented: yes        Previous Goals Met:  yes -diet was advanced and pt eating >75%      [] Participated in Interdisciplinary Rounds    [x] Interdisciplinary Care Plan Reviewed  DISCHARGE NUTRITION RECOMMENDATIONS ADDRESSED:     [x] Yes- recommended Diabetic Consistent Carb diet    NUTRITION RISK:           [x] At risk                        [] Not currently at risk        Will follow-up per policy.   Geno Almazan RD  PAGER:  [phone]

## 2017-08-04 NOTE — PROGRESS NOTES
0730 Assumed care of the patient, he currently denies pain or discomfort. 0900 Med sgiven, patient has eaten breakfast and has visitors at bedside. 1130 Report given to Angela, no changes to patient condition noted.

## 2017-08-04 NOTE — PROGRESS NOTES
Noted plan for surgical intervention tomorrow. CM to follow up post surgical procedure. Continuing to follow.

## 2017-08-04 NOTE — PROGRESS NOTES
Problem: Mobility Impaired (Adult and Pediatric)  Goal: *Acute Goals and Plan of Care (Insert Text)  In 1-7 days pt will be able to perform:  ST. Bed mobility: Rolling L to R to L modified independent for positioning. 2. Supine to sit to supine S with HR for meals. 3. Sit to stand to sit S with RW in prep for ambulation. LT. Gait: Ambulate >150ft S with RW, L NWB, for home/community mobility. 2. Stair Negotiation: Ascend/descend >3 steps L NWB CGA with HRs for home entry. 3. Activity tolerance: Tolerate up in chair 1-2 hours for ADLs. 4. Patient/Family Education: Patient/family to be independent with HEP for follow-up care and safe discharge. 17 Updated goals to be met in 7 days:  ST. Bed mobility: Rolling L to R to L modified independent for positioning. 2. Supine to sit to supine S with HR for meals. 3. Sit to stand to sit S with RW in prep for ambulation. LT. Gait: Ambulate >150ft S with RW, L WBAT, for home/community mobility. 2. Stair Negotiation: Ascend/descend >3 steps L WBAT CGA with HRs for home entry. 3. Activity tolerance: Tolerate up in chair 1-2 hours for ADLs. 4. Patient/Family Education: Patient/family to be independent with HEP for follow-up care and safe discharge. Outcome: Progressing Towards Goal  PHYSICAL THERAPY TREATMENT     Patient: Luz Alamo (22 y.o. male)  Date: 2017  Diagnosis: Cellulitis  CELLULITIS OF LEFT FOOT Cellulitis  Procedure(s) (LRB):  LEFT FOOT DEBRIDEMENT WITH C-ARM (Left) 8 Days Post-Op  Precautions: Fall, WBAT, Other (comment) (with ortho boot in place L foot)   Chart, physical therapy assessment, plan of care and goals were reviewed. ASSESSMENT:  Pt is able to again exceed ambulation goal. Pt willing to work on UE press ups, in prep for amputation. Will need new orders, including WB status, post surgery.   Progression toward goals:  [ ]      Improving appropriately and progressing toward goals  [X]      Improving slowly and progressing toward goals  [ ]      Not making progress toward goals and plan of care will be adjusted       PLAN:  Patient continues to benefit from skilled intervention to address the above impairments. Continue treatment per established plan of care. Discharge Recommendations:  Home Health or TBD  Further Equipment Recommendations for Discharge:  rolling walker       SUBJECTIVE:   Patient stated I'm fine.       OBJECTIVE DATA SUMMARY:   Critical Behavior:  Neurologic State: Alert  Orientation Level: Appropriate for age  Cognition: Appropriate decision making  Functional Mobility Training:  Bed Mobility:  Rolling: Modified independent  Supine to Sit: Modified independent  Sit to Supine: Modified independent  Scooting: Modified independent  Transfers:  Sit to Stand: Modified independent  Stand to Sit: Modified independent  Balance:  Sitting: Intact  Standing: Intact; With support  Ambulation/Gait Training:  Distance (ft): 350 Feet (ft)  Assistive Device: Walker, rolling;Gait belt  Ambulation - Level of Assistance: Stand-by asssistance;Contact guard assistance  Gait Abnormalities: Path deviations  Left Side Weight Bearing: As tolerated  Base of Support: Widened  Speed/Karlie: Slow  Interventions: Verbal cues; Safety awareness training  Pain:  Pain Scale 1: Numeric (0 - 10)  Pain Intensity 1: 4  Pain Location 1: Foot  Pain Orientation 1: Left  Pain Description 1: Aching  Pain Intervention(s) 1: Medication (see MAR)  Activity Tolerance:   Good  Please refer to the flowsheet for vital signs taken during this treatment.   After treatment:   [ ] Patient left in no apparent distress sitting up in chair  [X] Patient left in no apparent distress in bed  [X] Call bell left within reach  [X] Nursing notified  [ ] Caregiver present  [ ] Bed alarm activated      Della Davalos PTA   Time Calculation: 25 mins

## 2017-08-04 NOTE — ROUTINE PROCESS
Bedside and Verbal shift change report given to FRANK Genao RN (oncoming nurse) by Gemma Causey RN (offgoing nurse). Report included the following information SBAR, Kardex, Intake/Output, MAR, Accordion, Recent Results and Med Rec Status.

## 2017-08-04 NOTE — PROGRESS NOTES
Hospitalist Progress Note-critical care note     Patient: Zion Barkley MRN: [id number]  CSN: [id number]    YOB: 1965  Age: 46 y.o. Sex: male    DOA: 7/24/2017 LOS:  LOS: 11 days            Chief complaint: cellulitis, om,     Assessment/Plan         Hospital Problems  Never Reviewed          Codes Class Noted POA    Gangrene of toe ([facility] ICD-10-CM: A31  ICD-9-CM: 785.4  8/3/2017 Unknown        PAD (peripheral artery disease) ([facility] ICD-10-CM: I73.9  ICD-9-CM: 443.9  8/2/2017 Unknown        Osteomyelitis of left foot ([facility] ICD-10-CM: M86.9  ICD-9-CM: 730.27  8/1/2017 Unknown        Blind left eye ICD-10-CM: H54.42  ICD-9-CM: 369.60  7/31/2017 Unknown        * (Principal)Cellulitis ICD-10-CM: L03.90  ICD-9-CM: 682.9  7/24/2017 Unknown        Hyperglycemia due to type 2 diabetes mellitus ([facility] ICD-10-CM: E11.65  ICD-9-CM: 250.00  7/24/2017 Unknown        Hyponatremia ICD-10-CM: E87.1  ICD-9-CM: 276.1  7/24/2017 Unknown              1. Cellulitis L foot and Osteomyelitis L foot. podiatry and vascular on board  continue abx    -case discussed with Dr. Rehman Heads, the earliest time he can do the surgery will be Saturday afternoon   2. PAD  Vascular on board :Atherectomy and balloon angioplasty of the anterior tibial artery and  peroneal artery. Intra-arterial placement of nitroglycerin 1600 mg    3. DM2 uncontrolled. a1c 11.2  Uncontrolled   Will continue Lantus, premeal and ssi   Diabetic educator on board      4 Hyponatremia  Mild improving. Continue ns and continue monitor    5 left eye blindness   Fall precaution     Subjective: feel fine   Nurse: no acute issue     Waiting for surgery at the weekend     Review of systems:    General: No fevers or chills. Cardiovascular: No chest pain or pressure. No palpitations. Pulmonary: No shortness of breath. Gastrointestinal: No nausea, vomiting.      Vital signs/Intake and Output:  Visit Vitals    /67    Pulse 87    Temp 98.8 °F (37.1 °C)    Resp 20    Ht 5' 10\" (1.778 m)    Wt 87 kg (191 lb 12.8 oz)    SpO2 99%    BMI 27.52 kg/m2     Current Shift:  08/04 0701 - 08/04 1900  In: 960 [P.O.:960]  Out: 300 [Urine:300]  Last three shifts:  08/02 1901 - 08/04 0700  In: 3588.8 [P.O.:240; I.V.:3348.8]  Out: 3916 [Urine:3916]    Physical Exam:  General: WD, WN. Alert, cooperative, no acute distress    HEENT: NC, Atraumatic. Rt : PERRLA, anicteric sclerae. Left eye blindness   Lungs: CTA Bilaterally. No Wheezing/Rhonchi/Rales. Heart:  Regular  rhythm,  No murmur, No Rubs, No Gallops  Abdomen: Soft, Non distended, Non tender.  +Bowel sounds,   Extremities: No c/c. Left foot-wrapped. Dusky of toes -left foot   Psych:   Not anxious or agitated. Neurologic:  No acute neurological deficit. Labs: Results:       Chemistry Recent Labs      08/04/17   0341  08/03/17   0210  08/02/17   0335   GLU  160*  158*  178*   NA  138  134*  134*   K  3.9  3.7  4.0   CL  100  99*  98*   CO2  27  26  22   BUN  10  10  9   CREA  0.95  0.84  0.94   CA  8.5  8.5  8.2*   AGAP  11  9  14   BUCR  11*  12  10*      CBC w/Diff Recent Labs      08/03/17   0210  08/02/17   0335   WBC  10.2  11.4   RBC  4.08*  4.00*   HGB  11.7*  11.7*   HCT  34.5*  34.0*   PLT  372  362   GRANS  68  69   LYMPH  17*  18*   EOS  3  2      Cardiac Enzymes No results for input(s): CPK, CKND1, KARY in the last 72 hours. No lab exists for component: CKRMB, TROIP   Coagulation No results for input(s): PTP, INR, APTT in the last 72 hours. No lab exists for component: INREXT, INREXT    Lipid Panel Lab Results   Component Value Date/Time    Cholesterol, total 148 07/30/2017 02:35 AM    HDL Cholesterol 26 07/30/2017 02:35 AM    LDL, calculated 104.6 07/30/2017 02:35 AM    VLDL, calculated 17.4 07/30/2017 02:35 AM    Triglyceride 87 07/30/2017 02:35 AM    CHOL/HDL Ratio 5.7 07/30/2017 02:35 AM      BNP No results for input(s): BNPP in the last 72 hours.    Liver Enzymes No results for input(s): TP, ALB, TBIL, AP, SGOT, GPT in the last 72 hours.     No lab exists for component: DBIL   Thyroid Studies No results found for: T4, T3U, TSH, TSHEXT, TSHEXT     Procedures/imaging: see electronic medical records for all procedures/Xrays and details which were not copied into this note but were reviewed prior to creation of Rosario Garcia MD

## 2017-08-04 NOTE — PROGRESS NOTES
Pt Alexandra Rivera, 45 yo male. Pt in bed during bedside report. Pt speaks Bengali but he states he is able to understand [place]. Pt states he has no pain, n/v at shift change. Pt ambulated in room to bathroom without assistance. PT states pt is steady on his feet and weight bearing as tolerated with bandage and cast/shoe on. Bandage change completed. Pt tolerated well. No complaint of pain. Pt IV infiltrated. New IV access obtained in left forearm. IV infusing without difficulty. Pt in bed during shift change report.

## 2017-08-04 NOTE — PROGRESS NOTES
Shift Summary- Pt had an uneventful shift. Minimal pain to left foot reported, pt refused pain medication. Pt slept through the night. Dressing to left foot remained CDI.     Patient Vitals for the past 12 hrs:   Temp Pulse Resp BP SpO2   08/04/17 0418 98.4 °F (36.9 °C) 85 20 117/67 100 %   08/03/17 2258 99.3 °F (37.4 °C) 91 22 125/50 100 %   08/03/17 1951 98.6 °F (37 °C) 82 22 142/73 100 %

## 2017-08-04 NOTE — ROUTINE PROCESS
Bedside shift change report given to Ranulfo Clay (oncoming nurse) by Lucille Amezcua RN (offgoing nurse). Report included the following information SBAR, Kardex, MAR, Recent Results and Alarm Parameters .

## 2017-08-05 NOTE — ROUTINE PROCESS
Bedside shift change report given to Matt Kennedy RN (oncoming nurse) by Matthew Raya RN (offgoing nurse). Report included the following information SBAR, Kardex, Intake/Output, MAR, Recent Results, Med Rec Status and Alarm Parameters . Alarm parameters reviewed and opportunities for questions provided.

## 2017-08-05 NOTE — ROUTINE PROCESS
Bedside shift change report given to Paz Scheuermann, RN (oncoming nurse) by Len Patton Rn (offgoing nurse). Report included the following information SBAR, Kardex, MAR and Recent Results.

## 2017-08-05 NOTE — PROGRESS NOTES
Seen at bedside today  Left leg warm and he says has no pain  Toes with gangrene   Possible TMA, would be ideal for him if this would heal  Will follow along closely, discussed with pt and family, aware of possible BKA if no progress  S/p left Ant Tib and peroneal atherectomy

## 2017-08-05 NOTE — PROGRESS NOTES
Hospitalist Progress Note    Patient: Piotr Vilchis MRN: [id number]  CSN: [id number]    YOB: 1965  Age: 46 y.o. Sex: male    DOA: 7/24/2017 LOS:  LOS: 12 days            Assessment/Plan     1. Gangrene R foot w associated cellulitis & osteomyelitis on abx, podiatry & vascular following w plans for surgical intervention tomorrow AM  2. PAD  3. DM2 better control  4. HTN  5. Hyponatremia- resolved    Plan:  - continue broad spectrum antibiotics  - MRI foot  - surgical intervention tomorrow, discussed w Dr Deidra Hernandez  - cont basal/bolus insulin  - full code, dvt ppx lovenox    Addendum 6:24 PM  Asked to clear patient for surgery planned for tomorrow. Will obtain EKG, but provided no major abnormalities he is at moderate risk for cardiovascular perioperative morbidity/mortality due to multiple risk factors. Given active infection and acute nature of his problem recommend proceeding with planned procedure as risks outweigh benefits. Patient Active Problem List   Diagnosis Code    Cellulitis L03.90    Hyperglycemia due to type 2 diabetes mellitus ([facility] [address] E11.65    Hyponatremia E87.1    Blind left eye H54.42    Osteomyelitis of left foot (HCC) M86.9    PAD (peripheral artery disease) ([facility]) I73.9    Gangrene of toe ([facility]) I96               Subjective:    cc: \" Im OK\"  Translated by family at bedside  No acute events overnight  Denies pain  No fever, chills, n,v,d      REVIEW OF SYSTEMS:  General: No fevers or chills. Cardiovascular: No chest pain or pressure. No palpitations. Pulmonary: No shortness of breath. Gastrointestinal: No nausea, vomiting.      Objective:        Vital signs/Intake and Output:  Visit Vitals    /72 (BP 1 Location: Right arm, BP Patient Position: At rest)    Pulse 86    Temp 98.6 °F (37 °C)    Resp 16    Ht 5' 10\" (1.778 m)    Wt 86.4 kg (190 lb 8 oz)    SpO2 99%    BMI 27.33 kg/m2     Current Shift:  08/05 0701 - 08/05 1900  In: - Out: 1750 [KNEXH:2240]  Last three shifts:  08/03 1901 - 08/05 0700  In: 1497 [P.O.:2860; I.V.:3479]  Out: 5400 [Urine:5400]    Body mass index is 27.33 kg/(m^2).     Physical Exam:  GEN: Alert and oriented times three NAD  EYES: conjunctiva normal, lids with out lesions  HEENT: MMM, No thyromegaly, no lymphadenopathy  HEART: RRR +S1 +S2, no JVD, pulses 2+ distally  LUNGS: CTA B/L no wheezes, rales or rhonchi  ABDOMEN: + BS, soft NT/ND no organomegaly,  No rebound  EXTREMITIES:foot wrapped, gangrene noted   SKIN: no rashes or skin breakdown, no nodules, normal turgor  Current Facility-Administered Medications   Medication Dose Route Frequency    vancomycin trough 30 min prior to 1600 dose 8/5/17  1 Each Other ONCE    gadobutrol (Gadavist) contrast solution 7.5 mL  7.5 mL IntraVENous RAD ONCE    vancomycin (VANCOCIN) 1250 mg in  ml infusion  1,250 mg IntraVENous Q8H    insulin lispro (HUMALOG) injection   SubCUTAneous AC&HS    multivitamin, tx-iron-ca-min (THERA-M w/ IRON) tablet 1 Tab  1 Tab Oral DAILY    clopidogrel (PLAVIX) tablet 75 mg  75 mg Oral DAILY    acetaminophen (TYLENOL) tablet 650 mg  650 mg Oral Q4H PRN    oxyCODONE-acetaminophen (PERCOCET) 5-325 mg per tablet 1 Tab  1 Tab Oral Q4H PRN    morphine injection 1 mg  1 mg IntraVENous Q1H PRN    ondansetron (ZOFRAN) injection 4 mg  4 mg IntraVENous Q6H PRN    diphenhydrAMINE (BENADRYL) injection 12.5 mg  12.5 mg IntraVENous Q4H PRN    0.9% sodium chloride infusion  75 mL/hr IntraVENous CONTINUOUS    atorvastatin (LIPITOR) tablet 40 mg  40 mg Oral DAILY    insulin glargine (LANTUS) injection 40 Units  40 Units SubCUTAneous DAILY    insulin lispro (HUMALOG) injection 10 Units  10 Units SubCUTAneous TIDAC    levoFLOXacin (LEVAQUIN) 750 mg in D5W IVPB  750 mg IntraVENous Q24H    acetaminophen (TYLENOL) solution 650 mg  650 mg Oral Q6H PRN    HYDROcodone-acetaminophen (NORCO) 5-325 mg per tablet 1 Tab  1 Tab Oral Q4H PRN    ondansetron (ZOFRAN ODT) tablet 4 mg  4 mg Oral Q6H PRN    enoxaparin (LOVENOX) injection 40 mg  40 mg SubCUTAneous Q24H    Vancomycin - pharmacokinetic consult  1 Each Other PRN    glucose chewable tablet 16 g  4 Tab Oral PRN         All the patient's labs over the past 24 hours were reviewed both during my initial daily workflow process and at the time notated as \"note time\" in [facility]. (It is not time stamped separately in this workflow.)  Select labs are listed below.         Labs: Results:       Chemistry Recent Labs      08/05/17   0302  08/04/17   0341  08/03/17   0210   GLU  109*  160*  158*   NA  135*  138  134*   K  3.9  3.9  3.7   CL  99*  100  99*   CO2  26  27  26   BUN  9  10  10   CREA  0.81  0.95  0.84   CA  8.5  8.5  8.5   AGAP  10  11  9   BUCR  11*  11*  12      CBC w/Diff Recent Labs      08/03/17   0210   WBC  10.2   RBC  4.08*   HGB  11.7*   HCT  34.5*   PLT  372   GRANS  68   LYMPH  17*   EOS  3              Lipid Panel Lab Results   Component Value Date/Time    Cholesterol, total 148 07/30/2017 02:35 AM    HDL Cholesterol 26 07/30/2017 02:35 AM    LDL, calculated 104.6 07/30/2017 02:35 AM    VLDL, calculated 17.4 07/30/2017 02:35 AM    Triglyceride 87 07/30/2017 02:35 AM    CHOL/HDL Ratio 5.7 07/30/2017 02:35 AM                  Procedures/imaging: see electronic medical records for all procedures/Xrays and details which were not copied into this note but were reviewed prior to creation of [address] [place] [address] Rd, DO  Internal Medicine/Geriatrics

## 2017-08-05 NOTE — ROUTINE PROCESS
4646 Pt awake and alert in bed, talking on phone with family. Denies pain. Currently NPO pending possible surgery this afternoon. All meds held except Lantus per NPO protocol. Antony Kaye, RN spoke with Dr. Fernandez regarding pt diabetic status and NPO. This nurse contacted Dr. Vee Gifford regarding possible surgery and NPO status. It is noted Dr. Fernandez estimates procedure to be performed after 3pm. Dr. Vee Gifford states that it is OK to have meal as long as it does not interfere with procedure. If surgery recommends maintaining NPO states to begin D5W @ 50 mL/hr. This has been discussed with anesthesia and Dr. Viri Schultz. 1130 Upon discussion with Anesthesia and Dr. Viri Schultz, everyone involved in case is in agreement to allow pt to resume diet and anticipate performing surgery 8/6/17 @ 0800. MRI orders put in per Dr. Viri Schultz request to better visualize possible osteomyelitis. Pt has been informed of current plan as well as family at bedside and they express understanding and appreciation. Pt has ordered lunch and insulin will be given upon arrival of meal tray. [place] [address] with Dr. Viri Schultz regarding surgery tomorrow AM. MD states that surgery has been added to OR list and is scheduled for 8/6/17 @ 0700. MD also states that pt will need to be cleared for surgery by Dr. Vee Gifford. Dr. Vee Gifford has been informed of this and expresses understanding.

## 2017-08-05 NOTE — PROGRESS NOTES
Problem: Mobility Impaired (Adult and Pediatric)  Goal: *Acute Goals and Plan of Care (Insert Text)  In 1-7 days pt will be able to perform:  ST. Bed mobility: Rolling L to R to L modified independent for positioning. 2. Supine to sit to supine S with HR for meals. 3. Sit to stand to sit S with RW in prep for ambulation. LT. Gait: Ambulate >150ft S with RW, L NWB, for home/community mobility. 2. Stair Negotiation: Ascend/descend >3 steps L NWB CGA with HRs for home entry. 3. Activity tolerance: Tolerate up in chair 1-2 hours for ADLs. 4. Patient/Family Education: Patient/family to be independent with HEP for follow-up care and safe discharge. 17 Updated goals to be met in 7 days:  ST. Bed mobility: Rolling L to R to L modified independent for positioning. 2. Supine to sit to supine S with HR for meals. 3. Sit to stand to sit S with RW in prep for ambulation. LT. Gait: Ambulate >150ft S with RW, L WBAT, for home/community mobility. 2. Stair Negotiation: Ascend/descend >3 steps L WBAT CGA with HRs for home entry. 3. Activity tolerance: Tolerate up in chair 1-2 hours for ADLs. 4. Patient/Family Education: Patient/family to be independent with HEP for follow-up care and safe discharge. Outcome: Progressing Towards Goal  PHYSICAL THERAPY TREATMENT     Patient: Ryley Babin (28 y.o. male)  Date: 2017  Diagnosis: Cellulitis  CELLULITIS OF LEFT FOOT Cellulitis  Procedure(s) (LRB):  LEFT FOOT DEBRIDEMENT WITH C-ARM (Left) 9 Days Post-Op  Precautions: Fall, WBAT, Other (comment) (with ortho boot in place L foot)   Chart, physical therapy assessment, plan of care and goals were reviewed. ASSESSMENT:  Pt continues to perform well. 3 trials of 25' amb with  NWB completed randomly in prep for post surgery expectation. Well tolerated.   Progression toward goals:  [ ]      Improving appropriately and progressing toward goals  [X]      Improving slowly and progressing toward goals  [ ]      Not making progress toward goals and plan of care will be adjusted       PLAN:  Patient continues to benefit from skilled intervention to address the above impairments. Continue treatment per established plan of care. Discharge Recommendations: To Be Determined  Further Equipment Recommendations for Discharge:  bedside commode, gait belt and N/A       SUBJECTIVE:   Patient stated You want to walk? Troy Farris      OBJECTIVE DATA SUMMARY:   Critical Behavior:  Neurologic State: Alert  Orientation Level: Oriented X4  Cognition: Follows commands, Appropriate safety awareness, Appropriate decision making, Appropriate for age attention/concentration     Functional Mobility Training:  Bed Mobility:  Rolling: Modified independent  Supine to Sit: Modified independent  Sit to Supine: Modified independent  Scooting: Modified independent  Transfers:  Sit to Stand: Modified independent  Stand to Sit: Modified independent  Balance:  Sitting: Intact  Standing: Intact; With support  Ambulation/Gait Training:  Distance (ft): 500 Feet (ft)  Assistive Device: Walker, rolling;Gait belt  Ambulation - Level of Assistance: Stand-by asssistance;Contact guard assistance  Gait Abnormalities: Path deviations  Left Side Weight Bearing: As tolerated  Base of Support: Widened  Speed/Karlie: Slow  Interventions: Verbal cues; Safety awareness training  Therapeutic Exercises:   Walker push ups x 10  Pain:  Pain Scale 1: Numeric (0 - 10)  Pain Intensity 1: 0  Activity Tolerance:   Fair  Please refer to the flowsheet for vital signs taken during this treatment.   After treatment:   [X] Patient left in no apparent distress sitting up in chair  [ ] Patient left in no apparent distress in bed  [X] Call bell left within reach  [ ] Nursing notified  [X] RN present  [ ] Bed alarm activated      Zaina Parra PTA   Time Calculation: 25 mins

## 2017-08-06 PROBLEM — L03.90 CELLULITIS: Status: RESOLVED | Noted: 2017-01-01 | Resolved: 2017-01-01

## 2017-08-06 PROBLEM — I96 GANGRENE OF TOE (HCC): Status: RESOLVED | Noted: 2017-01-01 | Resolved: 2017-01-01

## 2017-08-06 PROBLEM — M86.9 OSTEOMYELITIS OF LEFT FOOT (HCC): Status: RESOLVED | Noted: 2017-01-01 | Resolved: 2017-01-01

## 2017-08-06 NOTE — PERIOP NOTES
TRANSFER - OUT REPORT:    Verbal report given to Iris Rogers RN (name) on Vance Ambriz  being transferred to 3 S  (unit) for routine progression of care       Report consisted of patients Situation, Background, Assessment and   Recommendations(SBAR). Information from the following report(s) SBAR, Kardex, Procedure Summary and Intake/Output was reviewed with the receiving nurse. Lines:   Peripheral IV 08/03/17 Left Forearm (Active)   Site Assessment Clean, dry, & intact 8/6/2017 12:05 PM   Phlebitis Assessment 0 8/6/2017 12:05 PM   Infiltration Assessment 0 8/6/2017 12:05 PM   Dressing Status Clean, dry, & intact 8/6/2017 12:05 PM   Dressing Type Transparent;Tape 8/6/2017 12:05 PM   Hub Color/Line Status Infusing 8/6/2017 12:05 PM   Action Taken Open ports on tubing capped 8/5/2017 10:00 AM   Alcohol Cap Used Yes 8/5/2017 10:00 AM        Opportunity for questions and clarification was provided.       Patient transported with:   O2 @ 2 liters  Registered Nurse

## 2017-08-06 NOTE — PROGRESS NOTES
Problem: Mobility Impaired (Adult and Pediatric)  Goal: *Acute Goals and Plan of Care (Insert Text)  In 1-7 days pt will be able to perform:  ST. Bed mobility: Rolling L to R to L modified independent for positioning. 2. Supine to sit to supine S with HR for meals. 3. Sit to stand to sit S with RW in prep for ambulation. LT. Gait: Ambulate >150ft S with RW, L NWB, for home/community mobility. 2. Stair Negotiation: Ascend/descend >3 steps L NWB CGA with HRs for home entry. 3. Activity tolerance: Tolerate up in chair 1-2 hours for ADLs. 4. Patient/Family Education: Patient/family to be independent with HEP for follow-up care and safe discharge. 17 Updated goals to be met in 7 days:  ST. Bed mobility: Rolling L to R to L modified independent for positioning. 2. Supine to sit to supine S with HR for meals. 3. Sit to stand to sit S with RW in prep for ambulation. LT. Gait: Ambulate >150ft S with RW, L WBAT, for home/community mobility. 2. Stair Negotiation: Ascend/descend >3 steps L WBAT CGA with HRs for home entry. 3. Activity tolerance: Tolerate up in chair 1-2 hours for ADLs. 4. Patient/Family Education: Patient/family to be independent with HEP for follow-up care and safe discharge. Outcome: Resolved/Met Date Met:  17  Patient will be discharge from PT caseload due to surgical intervention. Please order a new consult for physical therapy if you would like an evaluation to be completed. Thank you.   Richard Foy, PTA

## 2017-08-06 NOTE — ROUTINE PROCESS
19:45: Received verbal/bedside change of shift report from FRANK Mazariegos RN. Report included SBAR, KARDEX, MAR and expected outcomes R/T procedure scheduled for tomorrow. No pre-procedure EKG obtained at this point, will F/U with Respiratory. 22:00: Pt received with family in attendance at bedside. Pt is drowsy and denies C/O. Respirations even and unlabored, skin W&D. Pre-procedure EKG obtained. Pt requested clarification R/T NPO status and comprehended answers given. IVF infusing as ordered. 06:00: Pt slept soundly entire shift and was in NAD. ABX therapy ongoing via IV. No change to overall condition noted. Pt received CGF wipedown X2 as well as Nosyn nasal swabs. Pt appears eager to have procedure commence.

## 2017-08-06 NOTE — PROGRESS NOTES
Podiatry:  Dr. Yuliet Mcdaniel    Patient is status post left foot I&D with amputation of 2nd, 3rd, 4th, 5th metatarsals and toes secondary to osteomyelitis.   Continue IV antibiotics as per Medicine  Patient is non-weight bearing left foot until suture removal in 2-3 weeks  Nursing to do daily dressing change with sterile gauze, 4x4 ABD, kerlix and ACE  Nursing to empty Navjot Nice drain as needed  Will follow as needed

## 2017-08-06 NOTE — PROGRESS NOTES
Problem: Mobility Impaired (Adult and Pediatric)  Goal: *Acute Goals and Plan of Care (Insert Text)  In 1-7 days pt will be able to perform:  ST. Bed mobility: Rolling L to R to L modified independent for positioning. 2. Supine to sit to supine S with HR for meals. 3. Sit to stand to sit S with RW in prep for ambulation. LT. Gait: Ambulate >150ft S with RW, L NWB, for home/community mobility. 2. Stair Negotiation: Ascend/descend >3 steps L NWB CGA with HRs for home entry. 3. Activity tolerance: Tolerate up in chair 1-2 hours for ADLs. 4. Patient/Family Education: Patient/family to be independent with HEP for follow-up care and safe discharge. 17 Updated goals to be met in 7 days:  ST. Bed mobility: Rolling L to R to L modified independent for positioning. 2. Supine to sit to supine S with HR for meals. 3. Sit to stand to sit S with RW in prep for ambulation. LT. Gait: Ambulate >150ft S with RW, L WBAT, for home/community mobility. 2. Stair Negotiation: Ascend/descend >3 steps L WBAT CGA with HRs for home entry. 3. Activity tolerance: Tolerate up in chair 1-2 hours for ADLs. 4. Patient/Family Education: Patient/family to be independent with HEP for follow-up care and safe discharge. PT session held due to:  [ ]  Nausea/vomiting  [ ]  RN Communication/ suggestion  [ ]  Extreme Pain  [X]  Pt currently off the floor for scheduled procedure. Will f/u later. Thank you.   Bonnie Rosario PTA

## 2017-08-06 NOTE — ROUTINE PROCESS
Bedside shift change report given to Jarvis Cardenas RN (oncoming nurse) by Jonas Mcdaniels RN (offgoing nurse). Report included the following information SBAR, Kardex, OR Summary, Procedure Summary, Intake/Output, MAR, Recent Results, Med Rec Status and Alarm Parameters . Alarm parameters reviewed and opportunities for questions provided.

## 2017-08-06 NOTE — ROUTINE PROCESS
TRANSFER - IN REPORT:    Verbal report received from AICHA Tan RN (name) on Fausto Crimes  being received from OR (unit) for ordered procedure      Report consisted of patients Situation, Background, Assessment and   Recommendations(SBAR). Information from the following report(s) SBAR, Kardex, OR Summary, Procedure Summary, Intake/Output, MAR and Alarm Parameters  was reviewed with the receiving nurse. Opportunity for questions and clarification was provided. Assessment completed upon patients arrival to unit and care assumed.

## 2017-08-06 NOTE — H&P
Date of Surgery Update:  Corinne Lively was seen and examined. History and physical has been reviewed. The patient has been examined.  There have been no significant clinical changes since the completion of the originally dated History and Physical.    Signed By: Gissell Hawkins DPM     August 6, 2017 8:27 AM

## 2017-08-06 NOTE — ROUTINE PROCESS
0 Pt returned back from floor. PACU nurse and Dr. Capri Galeana at bedside. Pt denies pain. Surgical dressing is dry and intact, MICAELA drain to surgical site charged with small amount serousanguineous drainage to bulb. Dr. Capri Galeana instructs pt to remain on bedrest and non weight bearing to left lower extremity. MD states to maintain dressing in place until tomorrow and then begin daily dressing changes per orders. Diabetic diet is resumed insulin will be given upon arrival of meal tray. Dr. Capri Galeana states that sutures will remain for 2-3 weeks and will be NWB during this time. MD states that upon discharge pt will need follow up with Dr. Tami Charles as well as follow up with a PCP for diabetes management. They express understanding.

## 2017-08-06 NOTE — PROGRESS NOTES
Hospitalist Progress Note    Patient: Maximiliano Roca MRN: [id number]  CSN: [id number]    YOB: 1965  Age: 46 y.o. Sex: male    DOA: 7/24/2017 LOS:  LOS: 13 days          Chief Complaint:    Osteo of foot      Assessment/Plan     1. Gangrene foot w associated cellulitis & osteomyelitis on abx, s/p MT excisions done today 4 toes  2. PAD s/p angioplasty of LE  3. DM2 better control-stable on current regimen  4. HTN  5.  Hyponatremia- resolved    NWB of foot  IV abx for now, has been on almost 2 weeks  Will need 70/30 for home as not insured and wont be able to get lantus likely  Will need close podiatry follow up  BG monitoring  DVT prophylaxis    Patient Active Problem List   Diagnosis Code    Cellulitis L03.90    Hyperglycemia due to type 2 diabetes mellitus ([facility] E11.65    Hyponatremia E87.1    Blind left eye H54.42    PAD (peripheral artery disease) ([facility]) I73.9       Subjective:    Out of surgery, back to floor  No concerns  No pain issues    Review of systems:    Constitutional: denies fevers, chills, myalgias  Respiratory: denies SOB, cough  Cardiovascular: denies chest pain, palpitations  Gastrointestinal: denies nausea, vomiting, diarrhea      Vital signs/Intake and Output:  Visit Vitals    /71    Pulse 90    Temp 97.2 °F (36.2 °C)    Resp 16    Ht 5' 10\" (1.778 m)    Wt 83.3 kg (183 lb 10.3 oz)    SpO2 95%    BMI 25.61 kg/m2     Current Shift:  08/06 0701 - 08/06 1900  In: 1300 [I.V.:1300]  Out: 20   Last three shifts:  08/04 1901 - 08/06 0700  In: 3889 [P.O.:960; I.V.:2929]  Out: 4000 [Urine:4000]    Exam:    General: Well developed, alert, NAD, OX3  Head/Neck: NCAT, supple, No masses, No lymphadenopathy  CVS:Regular rate and rhythm, no M/R/G, S1/S2 heard, no thrill  Lungs:Clear to auscultation bilaterally, no wheezes, rhonchi, or rales  Abdomen: Soft, Nontender, No distention, Normal Bowel sounds, No hepatomegaly  Extremities:foot wrapped, big toe visible   Skin:normal texture and turgor, no rashes, no lesions  Neuro:grossly normal , follows commands  Psych:appropriate                Labs: Results:       Chemistry Recent Labs      08/06/17   0221  08/05/17   0302  08/04/17   0341   GLU  153*  109*  160*   NA  135*  135*  138   K  4.1  3.9  3.9   CL  99*  99*  100   CO2  27  26  27   BUN  14  9  10   CREA  1.09  0.81  0.95   CA  8.4*  8.5  8.5   AGAP  9  10  11   BUCR  13  11*  11*      CBC w/Diff No results for input(s): WBC, RBC, HGB, HCT, PLT, GRANS, LYMPH, EOS, HGBEXT, HCTEXT, PLTEXT in the last 72 hours. Cardiac Enzymes No results for input(s): CPK, CKND1, KARY in the last 72 hours. No lab exists for component: CKRMB, TROIP   Coagulation No results for input(s): PTP, INR, APTT in the last 72 hours. No lab exists for component: INREXT    Lipid Panel Lab Results   Component Value Date/Time    Cholesterol, total 148 07/30/2017 02:35 AM    HDL Cholesterol 26 07/30/2017 02:35 AM    LDL, calculated 104.6 07/30/2017 02:35 AM    VLDL, calculated 17.4 07/30/2017 02:35 AM    Triglyceride 87 07/30/2017 02:35 AM    CHOL/HDL Ratio 5.7 07/30/2017 02:35 AM      BNP No results for input(s): BNPP in the last 72 hours. Liver Enzymes No results for input(s): TP, ALB, TBIL, AP, SGOT, GPT in the last 72 hours.     No lab exists for component: DBIL   Thyroid Studies No results found for: T4, T3U, TSH, TSHEXT     Procedures/imaging: see electronic medical records for all procedures/Xrays and details which were not copied into this note but were reviewed prior to creation of Saurabh Short MD

## 2017-08-06 NOTE — ROUTINE PROCESS
4376 Bedside shift change report given to Velvet 1 (oncoming nurse) by Mohan Braswell (offgoing nurse). Report included the following information SBAR, Kardex, Procedure Summary, Intake/Output, MAR, Recent Results and Alarm Parameters . Alarm parameters reviewed and opportunities for questions provided. 0800 TRANSFER - OUT REPORT:    Verbal report given to Tamika Desai RN on Vance Ambriz  being transferred to OR for ordered procedure       Report consisted of patients Situation, Background, Assessment and   Recommendations(SBAR). Information from the following report(s) SBAR, Kardex, Procedure Summary, Intake/Output, MAR, Recent Results, Med Rec Status and Alarm Parameters  was reviewed with the receiving nurse. Lines:   Peripheral IV 08/03/17 Left Forearm (Active)   Site Assessment Clean, dry, & intact 8/5/2017 10:00 AM   Phlebitis Assessment 0 8/5/2017 10:00 AM   Infiltration Assessment 0 8/5/2017 10:00 AM   Dressing Status Clean, dry, & intact 8/5/2017 10:00 AM   Dressing Type Transparent 8/5/2017 10:00 AM   Hub Color/Line Status Blue;Flushed; Infusing 8/5/2017 10:00 AM   Action Taken Open ports on tubing capped 8/5/2017 10:00 AM   Alcohol Cap Used Yes 8/5/2017 10:00 AM        Opportunity for questions and clarification was provided.       Patient transported with:  Transport

## 2017-08-06 NOTE — PROGRESS NOTES
Pharmacy Dosing Services: Vancomycin     Consult for Vancomycin Dosing by Pharmacy by Dr. Dharmesh Espinoza provided for this 46y.o. year old male , for indication of cellulitis and osteomyelitis. Day of Therapy 14  Scr = 1.09   CrCl = 80.9 ml/min  (down from 108.9 ml/min yesterday) WBC = 10.2 (last reported 8/3/17)    Vancomycin Trough:  19.8 mcg/ml (8/5/17 at 1450) . Based on the fact that the previous vanco dose was given late and the trough was drawn early the adjusted trough is closer to 16 mcg/ml which is within therapeutic range of 15-20 mcg/ml. Future doses were re-timed based on most recent administration. Pt had several toes amputated from left foot this morning. Should renal function continue to decline consider a random level and adjust dose accordingly. Ht Readings from Last 1 Encounters:   07/27/17 177.8 cm (70\")        Wt Readings from Last 1 Encounters:   08/06/17 83.3 kg (183 lb 10.3 oz)        Serum Creatinine Lab Results   Component Value Date/Time    Creatinine 1.09 08/06/2017 02:21 AM      Creatinine Clearance Estimated Creatinine Clearance: 80.9 mL/min (based on Cr of 1.09). BUN Lab Results   Component Value Date/Time    BUN 14 08/06/2017 02:21 AM      WBC Lab Results   Component Value Date/Time    WBC 10.2 08/03/2017 02:10 AM      H/H Lab Results   Component Value Date/Time    HGB 11.7 08/03/2017 02:10 AM      Platelets Lab Results   Component Value Date/Time    PLATELET 438 98/75/0300 02:10 AM      Temp 97.2 °F (36.2 °C)       Pharmacy to follow daily and will make changes to dose and/or frequency based on clinical status.        Pharmacist Princess Verdin, [facility]

## 2017-08-06 NOTE — ANESTHESIA POSTPROCEDURE EVALUATION
Post-Anesthesia Evaluation and Assessment    Cardiovascular Function/Vital Signs  Visit Vitals    /66    Pulse 90    Temp 36.6 °C (97.9 °F)    Resp 16    Ht 5' 10\" (1.778 m)    Wt 83.3 kg (183 lb 10.3 oz)    SpO2 97%    BMI 25.61 kg/m2       Patient is status post Procedure(s):  INCISION AND DRAINAGE LEFT FOOT WITH TRANSMETATARSAL AMPUTATION. Nausea/Vomiting: Controlled. Postoperative hydration reviewed and adequate. Pain:  Pain Scale 1: Numeric (0 - 10) (08/06/17 1155)  Pain Intensity 1: 0 (08/06/17 1155)   Managed. Neurological Status:   Neuro (WDL): Within Defined Limits (08/06/17 1155)   At baseline. Mental Status and Level of Consciousness: Arousable. Pulmonary Status:   O2 Device: Nasal cannula (08/06/17 1155)   Adequate oxygenation and airway patent. Complications related to anesthesia: None    Post-anesthesia assessment completed. No concerns. Patient has met all discharge requirements.     Signed By: Reshma Valenzuela CRNA    August 6, 2017

## 2017-08-06 NOTE — ANESTHESIA PREPROCEDURE EVALUATION
Anesthetic History   No history of anesthetic complications            Review of Systems / Medical History  Patient summary reviewed, nursing notes reviewed and pertinent labs reviewed    Pulmonary          Smoker      Comments: Did not smoke today. Neuro/Psych   Within defined limits           Cardiovascular  Within defined limits                     GI/Hepatic/Renal  Within defined limits              Endo/Other    Diabetes: poorly controlled, type 2         Other Findings            Physical Exam    Airway  Mallampati: II  TM Distance: 4 - 6 cm  Neck ROM: normal range of motion   Mouth opening: Normal     Cardiovascular    Rhythm: regular  Rate: normal         Dental      Comments: Multiple broken and missing teeth. Poor oral hygiene. Pulmonary  Breath sounds clear to auscultation               Abdominal  GI exam deferred       Other Findings            Anesthetic Plan    ASA: 3  Anesthesia type: general          Induction: Intravenous  Anesthetic plan and risks discussed with: Patient      Accucheck- fbs = 135.

## 2017-08-06 NOTE — PROGRESS NOTES
Attempted to round on patient, has been in OR all morning. Vitals/ labs/ notes/orders reviewed.  Will attempt to see again    Ryanne Frazier, DO  Internal Medicine/ Geriatrics

## 2017-08-06 NOTE — ROUTINE PROCESS
Bedside shift change report given to FRANK Zepeda (oncoming nurse) by Larry Kuo (offgoing nurse). Report included the following information SBAR, KARDEX, MAR, recent results and pre-procedure checklist data.

## 2017-08-06 NOTE — ROUTINE PROCESS
Bedside shift change report given to Malgorzata Larsen RN (oncoming nurse) by Lawson Mark RN (offgoing nurse). Report included the following information SBAR, Kardex, OR Summary, Procedure Summary, Intake/Output, MAR, Recent Results, Med Rec Status and Alarm Parameters      Alarm parameters reviewed and opportunities for questions provided. Chica Majano
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
## 2017-08-06 NOTE — BRIEF OP NOTE
BRIEF OPERATIVE NOTE    Date of Procedure: 8/6/2017   Preoperative Diagnosis: infected left foot  Postoperative Diagnosis: infected left foot    Procedure(s):  INCISION AND DRAINAGE LEFT FOOT WITH TRANSMETATARSAL AMPUTATION  Surgeon(s) and Role:     * Jamil Whitman DPM - Primary         Assistant Staff:       Surgical Staff:  Circ-1: Carlos Newton RN  Scrub Tech-1: Media Brea  Scrub Tech-2: Demi Query  Event Time In   Incision Start 0914   Incision Close 1126     Anesthesia: General   Estimated Blood Loss: min <20ml  Specimens:   ID Type Source Tests Collected by Time Destination   1 : left fore-foot Preservative Foot, left  Jamil Whitman DPM 8/6/2017 0950 Pathology   2 : LEFT FIFTH TOE Preservative Toe  Jamil Whitman DPM 8/6/2017 1024 Pathology   1 : swab left foot Wound Foot, left CULTURE, ANAEROBIC, CULTURE, WOUND W GRAM STAIN Jamil Whitman DPM 8/6/2017 0930 Microbiology      Findings: osteomyelitis left foot mets 2,3,4 with abscess and open wound   Complications: none  Implants: * No implants in log *

## 2017-08-07 NOTE — PROGRESS NOTES
8/7/2017 PT note: consult received and chart reviewed. Evaluation attempted. Pt refused at this time stating he had walked already with therapy and was not walking again (? When seen by OT). Will f/u at later time as pt schedule allows for PT evaluation. Thank you.    Gato Santiago, PT

## 2017-08-07 NOTE — PROGRESS NOTES
Hospitalist Progress Note    Patient: Kendra Villalpando MRN: [id number]  CSN: [id number]    YOB: 1965  Age: 46 y.o. Sex: male    DOA: 7/24/2017 LOS:  LOS: 14 days            Assessment/Plan     1. Gangrene R foot w associated cellulitis & osteomyelitis sp TMA  2. PAD  3. DM2 better control  4. HTN  5. Hyponatremia- resolved    Plan:  - continue antibiotics for additional 24 hours as he had surgical correction  - check CBC now, monitor blood pressure  - continue basal/bolus insulin will transition to metformin/ glipizide and decreased insulin due to cost restraints  - mobilize w PT   - anticipate DC next 2-3 days once cleared by podiatry & physical therapy      Patient Active Problem List   Diagnosis Code    Cellulitis L03.90    Hyperglycemia due to type 2 diabetes mellitus ([facility] E11.65    Hyponatremia E87.1    Blind left eye H54.42    PAD (peripheral artery disease) ([facility]) I73.9               Subjective:    cc: foot pain  No acute events overnight  Tolerated surgery  bp on low side overnight, asymptomatic      REVIEW OF SYSTEMS:  General: No fevers or chills. Cardiovascular: No chest pain or pressure. No palpitations. Pulmonary: No shortness of breath. Gastrointestinal: No nausea, vomiting. Objective:        Vital signs/Intake and Output:  Visit Vitals    /65 (BP 1 Location: Right arm, BP Patient Position: At rest)    Pulse 95    Temp 99.1 °F (37.3 °C)    Resp 16    Ht 5' 10\" (1.778 m)    Wt 84.6 kg (186 lb 9.6 oz)    SpO2 100%    BMI 26.03 kg/m2     Current Shift:  08/07 0701 - 08/07 1900  In: 480 [P.O.:480]  Out: 300 [Urine:300]  Last three shifts:  08/05 1901 - 08/07 0700  In: 3600 [I.V.:3600]  Out: 2400 [Urine:2350; Drains:30]    Body mass index is 26.03 kg/(m^2).     Physical Exam:  GEN: Alert and oriented times three NAD  EYES: conjunctiva normal, lids with out lesions  HEENT: MMM, No thyromegaly, no lymphadenopathy  HEART: RRR +S1 +S2, no JVD, pulses 2+ distally  LUNGS: CTA B/L no wheezes, rales or rhonchi  ABDOMEN: + BS, soft NT/ND no organomegaly,  No rebound  EXTREMITIES: No edema cyanosis, cap refill normal   SKIN: no rashes or skin breakdown, no nodules, normal turgor  Current Facility-Administered Medications   Medication Dose Route Frequency    0.9% sodium chloride infusion  75 mL/hr IntraVENous CONTINUOUS    sodium chloride (NS) flush 5-10 mL  5-10 mL IntraVENous PRN    naloxone (NARCAN) injection 0.2 mg  0.2 mg IntraVENous PRN    glucose chewable tablet 16 g  4 Tab Oral PRN    glucagon (GLUCAGEN) injection 1 mg  1 mg IntraMUSCular PRN    dextrose (D50W) injection syrg 12.5-25 g  25-50 mL IntraVENous PRN    vancomycin (VANCOCIN) 1250 mg in  ml infusion  1,250 mg IntraVENous Q8H    insulin lispro (HUMALOG) injection   SubCUTAneous AC&HS    multivitamin, tx-iron-ca-min (THERA-M w/ IRON) tablet 1 Tab  1 Tab Oral DAILY    clopidogrel (PLAVIX) tablet 75 mg  75 mg Oral DAILY    acetaminophen (TYLENOL) tablet 650 mg  650 mg Oral Q4H PRN    oxyCODONE-acetaminophen (PERCOCET) 5-325 mg per tablet 1 Tab  1 Tab Oral Q4H PRN    morphine injection 1 mg  1 mg IntraVENous Q1H PRN    ondansetron (ZOFRAN) injection 4 mg  4 mg IntraVENous Q6H PRN    diphenhydrAMINE (BENADRYL) injection 12.5 mg  12.5 mg IntraVENous Q4H PRN    atorvastatin (LIPITOR) tablet 40 mg  40 mg Oral DAILY    insulin glargine (LANTUS) injection 40 Units  40 Units SubCUTAneous DAILY    insulin lispro (HUMALOG) injection 10 Units  10 Units SubCUTAneous TIDAC    levoFLOXacin (LEVAQUIN) 750 mg in D5W IVPB  750 mg IntraVENous Q24H    acetaminophen (TYLENOL) solution 650 mg  650 mg Oral Q6H PRN    HYDROcodone-acetaminophen (NORCO) 5-325 mg per tablet 1 Tab  1 Tab Oral Q4H PRN    ondansetron (ZOFRAN ODT) tablet 4 mg  4 mg Oral Q6H PRN    enoxaparin (LOVENOX) injection 40 mg  40 mg SubCUTAneous Q24H    Vancomycin - pharmacokinetic consult  1 Each Other PRN         All the patient's labs over the past 24 hours were reviewed both during my initial daily workflow process and at the time notated as \"note time\" in [address] [facility]. (It is not time stamped separately in this workflow.)  Select labs are listed below.         Labs: Results:       Chemistry Recent Labs      08/07/17   0252  08/06/17   0221  08/05/17   0302   GLU  119*  153*  109*   NA  136  135*  135*   K  3.8  4.1  3.9   CL  100  99*  99*   CO2  26  27  26   BUN  15  14  9   CREA  1.42*  1.09  0.81   CA  8.2*  8.4*  8.5   AGAP  10  9  10   BUCR  11*  13  11*      CBC w/Diff Recent Labs      08/07/17   0252   WBC  8.1   RBC  3.71*   HGB  10.7*   HCT  32.4*   PLT  376   GRANS  63   LYMPH  20*   EOS  2              Lipid Panel Lab Results   Component Value Date/Time    Cholesterol, total 148 07/30/2017 02:35 AM    HDL Cholesterol 26 07/30/2017 02:35 AM    LDL, calculated 104.6 07/30/2017 02:35 AM    VLDL, calculated 17.4 07/30/2017 02:35 AM    Triglyceride 87 07/30/2017 02:35 AM    CHOL/HDL Ratio 5.7 07/30/2017 02:35 AM                  Procedures/imaging: see electronic medical records for all procedures/Xrays and details which were not copied into this note but were reviewed prior to creation of [address] [place] [address], DO  Internal Medicine/Geriatrics

## 2017-08-07 NOTE — PROGRESS NOTES
Patient was visited by AMARJIT. Volunteer followed up with patient and/or family and reported no needs to this . Chaplains will continue to follow and will provide pastoral care as needed or requested. [address], M.Div.   Board Certified   [phone] - Office

## 2017-08-07 NOTE — PROGRESS NOTES
NUTRITION FOLLOW-UP    RECOMMENDATIONS/PLAN:   Continue w/ POC  Monitor labs/lytes, wt, fluid status, and skin integrity     NUTRITION ASSESSMENT:   Client Update: 46 yrs old Male with gangrene R foot w/ associated cellulitis & osteomyelitis sp TMA on 8/6/17. Per MD on 8/6/17 hyponatremia resolved. Pt also noted to came in with hyperglycemia. Hg1A 11.2 per MD note pt was unaware of DM diagnosis. DM Educator saw pt on 7/27/17 for initial diet education, see notes. Spoke w/ nurse today and pt continues to eat well. Recc continue w/ current plan of care     FOOD/NUTRITION INTAKE   Diet Order:  Jeremi 1800, Reg  Supplements: n/a  Food Allergies: NKFA/  Average PO Intake:      Patient Vitals for the past 100 hrs:   % Diet Eaten   08/07/17 0818 75 %   08/05/17 1342 100 %   08/04/17 2221 75 %   08/04/17 1233 50 %   08/04/17 0934 75 %   08/03/17 0832 100 %      Pertinent Medications:  [x] Reviewed; plavix, Lovenox, Lantuss, Humalog, MVI, percocet, vancomycin     Insulin:  [x]SSI  [x]Pre-meal   []Basal    []Drip  []None  Cultural/Buddhist Food Preferences: None Identified    BIOCHEMICAL DATA & MEDICAL TESTS  Pertinent Labs:  [x] Reviewed; Ca-8.2 ANTHROPOMETRICS  Height: 5' 10\" (177.8 cm)       Weight: 84.6 kg (186 lb 9.6 oz)         BMI: 26 kg/m^2 overweight (25.0%-29.9% BMI)   Adm Weight: 190 lbs                Weight change: -4# since admission pt had TMA on 8/6/17   Adjusted Body Weight:171     NUTRITION-FOCUSED PHYSICAL ASSESSMENT  Skin: No pressure injury noted       GI: No BM noted since admission on 7/24/17    NUTRITION PRESCRIPTION  Calories: 2172 kcal/day based on [place] X 1.4  Protein:  g/day based on 1.3-1.5 g/kg  CHO: 272 g/day based on 50% of total energy  Fluid: 2172 ml/day based on 1 kcal/ml             NUTRITION DIAGNOSES:   1. Food and nutrition-related knowledge deficit related to DM diagnosis evidenced by dx of new-onset DM    NUTRITION INTERVENTIONS:   INTERVENTIONS:        GOALS:  1.  Continue w/ POC 1. Meet PO Intake >75% by next review 3 days             LEARNING NEEDS (Diet, Supplementation, Food/Nutrient-Drug Interaction):   [x] None Identified   [] Education provided/documented      Identified and patient: [] Declined   [] Was not appropriate/indicated        NUTRITION MONITORING /EVALUATION:   Follow PO intake  Monitor wt  Monitor renal labs, electrolytes, fluid status  Monitor for additional supplement needs     Previous Recommendations Implemented: yes        Previous Goals Met:  yes -Continues to eat >75% of tray      [] Participated in Interdisciplinary Rounds    [x] Interdisciplinary Care Plan Reviewed  DISCHARGE NUTRITION RECOMMENDATIONS ADDRESSED:     [x] Yes- recommended Diabetic Consistent Carb diet      NUTRITION RISK:           [x] At risk                        [] Not currently at risk        Will follow-up per policy.   Leyla Childress RD  PAGER:  [phone]

## 2017-08-07 NOTE — PROGRESS NOTES
8/7/2017 PT evaluation again attempted. Pt adamantly refused, again stating he had walked already today, just had surgery yesterday and will not walk again today. Pt did agree to participate with PT tomorrow. Attempted to explain the difference between PT and OT as OT saw pt in am.  Pt may benefit from co-visit for therapeutic intervention in order to have best opportunity for pt participation. Will f/u tomorrow for PT evaluation. Thank you.   Frederic Land, PT

## 2017-08-07 NOTE — PROGRESS NOTES
Primary nurse notified of VS with Temp of 101.2 and MEWS score of 4. Percocet given for pain as ordered prn - see flowsheet. Will continue to monitor pt's VS    0149 Primary nurse notified of BP of 70/40 obtained manually. Pt diaphoretic otherwise A/O x4, no complaints of pain nor dizziness. Dr. Erik Roberts paged    3570 BS of 117.  Gown, bed pad and bed linens changed, snack provided

## 2017-08-07 NOTE — OP NOTES
[address] [place] [address]  OPERATIVE REPORT    Name:  Otoniel Campbell  MR#:  [id number]  :  1965  Account #:  [de-identified]  Date of Adm:  2017  Date of Surgery:  2017      LOCATION: Ops. SURGEON: Helene Briggs DPM.    ASSISTANT: Jim    PREOPERATIVE DIAGNOSES:  1. Diabetic left foot abscess with gangrene, left second toe.  2. Osteomyelitis, left second, third, and fourth metatarsals. POSTOPERATIVE DIAGNOSES:  1. Diabetic left foot abscess with gangrene, left second toe.  2. Osteomyelitis, left second, third, and fourth metatarsals. PROCEDURES PERFORMED:  1. Incision and drainage, deep, left foot abscess. 2. Transmetatarsal amputation, left foot, except for the left first  metatarsal and big toe, secondary to osteomyelitis. ANESTHESIA: MAC with local consisting of 10 mL of 0.5% Marcaine  plain to the left foot. HEMOSTASIS: None. There was no ankle tourniquet used in this  procedure. ESTIMATED BLOOD LOSS: Minimal, less than 20 mL. SPECIMENS REMOVED: Aerobic and anaerobic cultures. Left forefoot amputation    MATERIALS: A Tee-Rojas drain #7 was used, also 2-0 and 3-0  nylon. INJECTABLES: None. COMPLICATIONS: None. INDICATIONS FOR PROCEDURE: The patient is a 70-year-old   diabetic male who [place] [facility]  [place] painful cellulitis infection, left foot, from stepping on a nail at work  approximately 1 week ago. CT and MRI testing show osteomyelitis in  the left second, third, and fourth metatarsals and also black  gangrenous left second toe. The patient also has a large open wound  on the top and bottom of his left forefoot from the initial infection. He  has been on IV antibiotics and has shown some improvement. He  p[place] [facility] [place] deep incision and drainage  of abscess with amputation of infected bone of his left foot.  The  planned procedures were explained to the patient in detail, including all  risks, benefits, and possible complications, and the patient still desires  surgical correction. All conservative treatments have failed to offer the  patient adequate relief at this time, and the patient desires and  requires surgical correction. Medical clearance was obtained prior to  surgery. Consent was signed and on chart. All patient's questions were  answered and no guarantees were given. Lastly the patient was asked  if he had any history of blood clots, bleeding disorders, and he stated  that he had none. DESCRIPTION OF PROCEDURE: The patient was brought to the  operating room and placed on the operating room table in the supine  position. The patient's left foot was then anesthetized using 10 mL of  0.5% bupivacaine plain as an ankle block. Next the patient's left foot  was then prepped and draped in the usual sterile manner. Attention was then drawn to the dorsal aspect of the patient's left  midfoot, where the infection/ulcer was noted at the second and third  metatarsal area. There was also a large, 6 x 3 cm, open ulcer noted  deep to tendons with necrotic tissue and the second toe was black,  dry, necrotic, gangrenous. An incision and drainage of the infected  abscess (complicated) was then performed using a #15 blade to make  an approximately 8 cm linear skin incision dorsally. A second #15  blade was then used to deepen this incision down to bone over the  second metatarsal at the dorsal left forefoot. Then the incision was  followed down along the medial and lateral portions of the second  metatarsal to open up any infected pockets and drain the abscess. A  second skin incision was then made dorsally over the fourth metatarsal  deep to bone and also a third incision was made plantarly under the  entire second metatarsal area MPJ/deep to bone. There was noted an  open wound/ulcer at this point plantarly from the original puncture  wound.  At this point deep cultures were taken, both aerobic and  anaerobic, and sent to the lab for analysis. The wounds were then  flushed with copious amounts of normal sterile saline mixed with  bacitracin. Blunt dissection was then used to expose the second metatarsal  diaphysis. A second #15 blade was then used to deepen this incision  to expose the entire head of the second metatarsal and diaphysis. The  second metatarsal head appeared to be gray and soft and therefore  the second metatarsal was deemed nonviable back to the diaphysis. Next blunt dissection was then continued over the left third and fourth  metatarsophalangeal joints or head, and then observed the third and  fourth metatarsals. They appeared to be dark gray, necrotic and not  viable. Therefore a transmetatarsal amputation of metatarsals 2, 3, 4.,5  was then needed. Therefore the sagittal saw was then used in the  frontal plane to make osteotomies in the diaphysis of the second, third,  fourth and fifth metatarsals. Sharp dissection was then used to remove  the entire lateral forefoot, including the metatarsal heads 2, 3, 4, 5 and toes,  were sent to Pathology for analysis. Next sharp dissection and  debridement were then done to remove all necrotic soft tissue in the  distal forefoot and around the original ulcer at the head of the second  metatarsal, which is where the puncture wound was made and the  abscess was originating. This necrotic tissue was all freed up and  debrided away down to healthy bleeding tissue. The excised tissue  was also sent to Pathology and Micro for culture. After deep  debridement was completed and all necrotic tissue was removed, the  surgical site was then irrigated and pulse lavaged with a solution mixed  with bacitracin to complete the deep incision and drainage. The wound  was then observed and no remaining portions of necrotic tissue were  found.  Therefore the incision was closed using 2-0 and 3-0 nylon in  simple interrupted technique to close approximately 90-95% of the  wound. The dorsal portion over the second metatarsal base was then  left open for the MICAELA drain to exit. The surgical site was then dressed  using 4 x 4's, gauze, Adaptic, ABDs, Kerlix and an Ace wrap. The  patient was then transported to the recovery room with vital signs  stable and neurovascular status returned to baseline for the patient's  left foot. The patient tolerated the procedure and anesthesia well with  no complications. POSTOPERATIVE CHECK: The patient was later seen in the recovery  room with his dressings clean, dry, and intact with no bleeding noted to  his left foot. The patient stated that he had no pain. The patient's  capillary refill time was then checked to his left big toe and found to be  less than 3 seconds. The patient was then brought back to the  medicine floor, where he will continue his IV antibiotics as per  Medicine, and Podiatry will follow him as needed.         TYESHA Ortiz / Natalya Butt  D:  08/06/2017   14:14  T:  08/06/2017   23:52  Job #:  [id number]

## 2017-08-07 NOTE — DIABETES MGMT
GLYCEMIC CONTROL PROGRESS NOTE:    -pt newly diagnosed with DM, mostly likely Type 2, HGbA1C 11.2%  -pt noted to be self pay  -glycemic control progressing with pt requiring less corrective coverage  -POCT + basal + bolus + corrective coverage orders in place  -per conversation with hospitalist anticipate pt will discharge on two oral antihyperglycemic agents   -oral therapy to start today to observe pt tolerance and response  -metformin therapy to start today, glipizide tomorrow  -GC will follow up with pt tomorrow for medication education    Recent Glucose Results:   Lab Results   Component Value Date/Time     (H) 08/07/2017 02:52 AM    GLUCPOC 128 (H) 08/07/2017 11:51 AM    GLUCPOC 130 (H) 08/07/2017 08:12 AM    GLUCPOC 130 (H) 08/07/2017 08:12 AM     Nickola Krabbe RN, MS  Glycemic Control Team

## 2017-08-07 NOTE — PROGRESS NOTES
Chart reviewed. Pt had I&D left foot abscess and transmetatarsal amputation yesterday. Pt would benefit from PT eval post op when able. Pt noted self pay, has been referred to [facility] for resources to assist with hospital stay. CM will provide [facility] Zambrano information for follow up as well as [facility] and [facility] as options to manage DM, HTN and assist with medications, if pt plans to remain in area; noted address is in [place]. CM continues to follow for needs at discharge.

## 2017-08-07 NOTE — ROUTINE PROCESS
Bedside, Verbal and Written shift change report given to KAYCEE Newman RN (oncoming nurse) by Enriqueta Okeefe (offgoing nurse). Report included the following information SBAR, Kardex, Procedure Summary, Intake/Output, MAR, Accordion and Recent Results.

## 2017-08-07 NOTE — PROGRESS NOTES
Pharmacy Dosing Services: Vancomycin    Consult for Vancomycin Dosing by Pharmacy by Dr. Osmani Maldonado provided for this 46y.o. year old male , for indication of Cellulitis/Osteomyelitis  Day of Therapy 15    Ht Readings from Last 1 Encounters:   07/27/17 177.8 cm (70\")        Wt Readings from Last 1 Encounters:   08/06/17 84.6 kg (186 lb 9.6 oz)        Previous Regimen Vancomycin 1250 mg IV q8   Last Level 18.4 mcg/ml   Other Current Antibiotics Levofloxacin 750 mg IV q24   Significant Cultures Pending   Serum Creatinine Lab Results   Component Value Date/Time    Creatinine 1.42 08/07/2017 02:52 AM      Creatinine Clearance Estimated Creatinine Clearance: 62.1 mL/min (based on Cr of 1.42). BUN Lab Results   Component Value Date/Time    BUN 15 08/07/2017 02:52 AM      WBC Lab Results   Component Value Date/Time    WBC 8.1 08/07/2017 02:52 AM      H/H Lab Results   Component Value Date/Time    HGB 10.7 08/07/2017 02:52 AM      Platelets Lab Results   Component Value Date/Time    PLATELET 336 66/90/7414 02:52 AM      Temp 98.7 °F (37.1 °C)     Current Vancomycin Trough=18.4 mcg/ml @ 17:20 [id number]. Trough is therapeutic, however, patient's SCr has increased 75% in the past 48 hours. Will adjust dose and interval to allow for improved clearance to 1750 mg IV q12h, to begin @ 00:00 [id number]. Expected Trough ~15 mcg/ml. Estimated PK Parameters:  ---------------------------  New rate constant (akilah): 0.103 hr-1  New half-life: 6.73 Hours  New Vd from levels: 59.50  Liters  (0.7 L/kg)    MARIE CORTES   Clinical Pharmacist  [phone]
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
## 2017-08-07 NOTE — ROUTINE PROCESS
Bedside and Verbal shift change report given to Arianna Del Toro RN (oncoming nurse) by Koby Pressley RN   (offgoing nurse). Report included the following information SBAR, Kardex, Intake/Output and MAR.

## 2017-08-07 NOTE — ROUTINE PROCESS
19:40: Received verbal/bedside change of shift report from FRANK Figueroa RN. Report included SBAR, KARDEX, MAR, post op report and recent results. 22:00: Received resident awake in bed and speaking cheerfully with family on cellphone. Pt appears euthymic and denies C/O. DSG to left foot D&I. Denies C/O pain at this time. IV ABX infusing as ordered. Monitoring ongoing. 01:48: B/P at this time measures 76/45 on monitor. HR 96, R 22, T 98.9. Pt is resting comfortably and denies C/O. No change to LOC evident. Dressing to left foot (amputation site) D&I. Physician paged STAT. NS bolus started STAT while waiting on physician callback. B/P elevated to 86/51, HR 89 R 22 within twenty minutes of bolus starting. Obtained order from provider to begin continuous fluids NS at 75 cc/hr. 04:00: B/P 98/47 P 83 R 18 T 98.1. Pt continues to rest quietly and denies C/O. Monitoring ongoing. 06:30: B/P 103/58 P 83 R 18 T 99.0. Pt continues to rest quietly and denies C/O.  NS infusing as ordered. IV ABX therapy maintained.

## 2017-08-08 NOTE — PROGRESS NOTES
conducted a Follow up consultation and Spiritual Assessment for Haven Miranda, who is a 46 y. o.,male. Continued the relationship of care and support. Listened empathically. Offered prayer and assurance of continued prayer on patients behalf. Plan:  Chaplains will continue to follow and will provide pastoral care as needed or requested.  recommends bedside caregivers page the  on duty if patient shows signs of acute spiritual or emotional distress. Father FELI FischerDiv.   [address] [facility] - Office

## 2017-08-08 NOTE — PROGRESS NOTES
8/9/17: Noted Podiatry recommendation for crutches upon discharge. Should crutches been needed please obtain a pt label and have it processed through materials management. Please note, pt's height (5'10) will have to be provided to ensure appropriate size crutches are obtained. 8/8/17: CM has been notified pt will be returning to his sister's home in [place] upon discharge. There are approximately 3 stairs to enter. Please advise if therapy is able to assist pt with stair training. Pt will require a RW to assist with discharge. Please also teach pt/family wound care and provide a few days dressing supplies in preparation for discharge. Anticipate pt will be discharged with in the next 24-36 hours.   CM to continue to follow and assist.

## 2017-08-08 NOTE — ROUTINE PROCESS
Verbal shift change report given to WILLIE Valladares RN (oncoming nurse) by Vivienne Lizarraga   (offgoing nurse). Report included the following information SBAR, Kardex and MAR.
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
## 2017-08-08 NOTE — PROGRESS NOTES
Shift summary:   Patient rested in bed. Tylenol given for temp of 100.2 at 1548, with improvement noted. No complaints of pain/nausea. Eating well. Dressing changed per MD order. Call light remained within reach. Vitals monitored.      Patient Vitals for the past 12 hrs:   Temp Pulse Resp BP SpO2   08/07/17 2233 98.2 °F (36.8 °C) 86 16 137/69 100 %   08/07/17 2139 99 °F (37.2 °C) 84 16 117/51 98 %   08/07/17 1934 98 °F (36.7 °C) 83 16 93/58 100 %   08/07/17 1838 98.7 °F (37.1 °C) 84 16 107/62 99 %   08/07/17 1548 100.2 °F (37.9 °C) 94 16 118/59 97 %   08/07/17 1159 - - - - 98 %

## 2017-08-08 NOTE — PROGRESS NOTES
Shift Summary :  No signs of distress. Dressing to left foot dry and intact with MICAELA drain with scant drainage. Up to bathroom using walker. Had a large formed stool. Voiding. IVFs continued. Vitals signs stable.   Patient Vitals for the past 12 hrs:   Temp Pulse Resp BP SpO2   08/08/17 0338 98.9 °F (37.2 °C) 86 20 114/55 100 %   08/07/17 2233 98.2 °F (36.8 °C) 86 16 137/69 100 %   08/07/17 2139 99 °F (37.2 °C) 84 16 117/51 98 %   08/07/17 1934 98 °F (36.7 °C) 83 16 93/58 100 %   08/07/17 1838 98.7 °F (37.1 °C) 84 16 107/62 99 %

## 2017-08-08 NOTE — PROGRESS NOTES
Podiatry:  Dr. Pamela Cassidy    Patient seen at bedside AAOx3 with not pain or problems. His dressing is clean and dry left foot with MICAELA drain intact. Incision is slightly wet distally but well closed with sutures intact. Skin edges look healthy and pink with good capillary refill distal hallux. No cellulitis noted or mal odor noted.       Status post left TMA (without first metatarsal/hallux) secondary to abscess, gangrene and osteomyelitis  Continue IV antibiotics as per medicine  Continue non-weight bearing until sutures are removed left foot, dispense crutches on discharge  Continue daily nursing dressing changes and MICAELA drain maintenance   OK discharge to home this week as per medicine  Patient will follow up in my office after discharge  Call my office with any questions- [phone]

## 2017-08-08 NOTE — ROUTINE PROCESS
Bedside and Verbal shift change report given to Karina Zamorano RN (oncoming nurse) by Félix Win RN (offgoing nurse). Report included the following information SBAR and Kardex.

## 2017-08-08 NOTE — PROGRESS NOTES
Hospitalist Progress Note    Patient: Karlene Melgar MRN: [id number]  CSN: [id number]    YOB: 1965  Age: 46 y.o. Sex: male    DOA: 7/24/2017 LOS:  LOS: 15 days            Assessment/Plan     1. Gangrene R foot w associated cellulitis & osteomyelitis sp TMA  2. PAD  3. DM2   4. HTN  5. Hyponatremia- resolved     Plan:  - stop antibiotics  - attempting to transition to oral regimen for diabetes given lack of insurance, metformin started yesterday will add glipizide and decrease basal/bolus insulin  - continue lipitor, plavix  - drain per podiatry  - mobilize w PT      Patient Active Problem List   Diagnosis Code    Cellulitis L03.90    Hyperglycemia due to type 2 diabetes mellitus ([facility] E11.65    Hyponatremia E87.1    Blind left eye H54.42    PAD (peripheral artery disease) ([facility]) I73.9               Subjective:    cc: \" Im OK\"  No acute events overnight  No diarrhea, fever or chills      REVIEW OF SYSTEMS:  General: No fevers or chills. Cardiovascular: No chest pain or pressure. No palpitations. Pulmonary: No shortness of breath. Gastrointestinal: No nausea, vomiting. Objective:        Vital signs/Intake and Output:  Visit Vitals    /55    Pulse 86    Temp 98.9 °F (37.2 °C)    Resp 20    Ht 5' 10\" (1.778 m)    Wt 84.6 kg (186 lb 9.6 oz)    SpO2 100%    BMI 26.03 kg/m2     Current Shift:     Last three shifts:  08/06 1901 - 08/08 0700  In: 2681.7 [P.O.:480; I.V.:2201.7]  Out: 3630 [Urine:3600; Drains:30]    Body mass index is 26.03 kg/(m^2).     Physical Exam:  GEN: Alert and oriented times three NAD  EYES: conjunctiva normal, lids with out lesions  HEENT: MMM, No thyromegaly, no lymphadenopathy  HEART: RRR +S1 +S2, no JVD, pulses 2+ distally  LUNGS: CTA B/L no wheezes, rales or rhonchi  ABDOMEN: + BS, soft NT/ND no organomegaly,  No rebound  EXTREMITIES: No edema cyanosis, cap refill normal, foot wrapped cdi   SKIN: no rashes or skin breakdown, no nodules, normal turgor  Current Facility-Administered Medications   Medication Dose Route Frequency    glipiZIDE (GLUCOTROL) tablet 10 mg  10 mg Oral ACB&D    insulin lispro (HUMALOG) injection 5 Units  5 Units SubCUTAneous TIDAC    0.9% sodium chloride infusion  100 mL/hr IntraVENous CONTINUOUS    0.9% sodium chloride infusion  100 mL/hr IntraVENous CONTINUOUS    metFORMIN (GLUCOPHAGE) tablet 500 mg  500 mg Oral BID WITH MEALS    insulin glargine (LANTUS) injection 30 Units  30 Units SubCUTAneous DAILY    vancomycin (VANCOCIN) 1,750 mg in 0.9% sodium chloride 500 mL IVPB  1,750 mg IntraVENous Q12H    sodium chloride (NS) flush 5-10 mL  5-10 mL IntraVENous PRN    naloxone (NARCAN) injection 0.2 mg  0.2 mg IntraVENous PRN    glucose chewable tablet 16 g  4 Tab Oral PRN    glucagon (GLUCAGEN) injection 1 mg  1 mg IntraMUSCular PRN    dextrose (D50W) injection syrg 12.5-25 g  25-50 mL IntraVENous PRN    insulin lispro (HUMALOG) injection   SubCUTAneous AC&HS    multivitamin, tx-iron-ca-min (THERA-M w/ IRON) tablet 1 Tab  1 Tab Oral DAILY    clopidogrel (PLAVIX) tablet 75 mg  75 mg Oral DAILY    acetaminophen (TYLENOL) tablet 650 mg  650 mg Oral Q4H PRN    oxyCODONE-acetaminophen (PERCOCET) 5-325 mg per tablet 1 Tab  1 Tab Oral Q4H PRN    morphine injection 1 mg  1 mg IntraVENous Q1H PRN    ondansetron (ZOFRAN) injection 4 mg  4 mg IntraVENous Q6H PRN    diphenhydrAMINE (BENADRYL) injection 12.5 mg  12.5 mg IntraVENous Q4H PRN    atorvastatin (LIPITOR) tablet 40 mg  40 mg Oral DAILY    levoFLOXacin (LEVAQUIN) 750 mg in D5W IVPB  750 mg IntraVENous Q24H    acetaminophen (TYLENOL) solution 650 mg  650 mg Oral Q6H PRN    HYDROcodone-acetaminophen (NORCO) 5-325 mg per tablet 1 Tab  1 Tab Oral Q4H PRN    ondansetron (ZOFRAN ODT) tablet 4 mg  4 mg Oral Q6H PRN    enoxaparin (LOVENOX) injection 40 mg  40 mg SubCUTAneous Q24H    Vancomycin - pharmacokinetic consult  1 Each Other PRN         All the patient's labs over the past 24 hours were reviewed both during my initial daily workflow process and at the time notated as \"note time\" in [address] [facility]. (It is not time stamped separately in this workflow.)  Select labs are listed below.         Labs: Results:       Chemistry Recent Labs      08/08/17   0043  08/07/17   0252  08/06/17   0221   GLU  200*  119*  153*   NA  136  136  135*   K  3.7  3.8  4.1   CL  101  100  99*   CO2  28  26  27   BUN  13  15  14   CREA  1.01  1.42*  1.09   CA  8.1*  8.2*  8.4*   AGAP  7  10  9   BUCR  13  11*  13      CBC w/Diff Recent Labs      08/07/17 0252   WBC  8.1   RBC  3.71*   HGB  10.7*   HCT  32.4*   PLT  376   GRANS  63   LYMPH  20*   EOS  2              Lipid Panel Lab Results   Component Value Date/Time    Cholesterol, total 148 07/30/2017 02:35 AM    HDL Cholesterol 26 07/30/2017 02:35 AM    LDL, calculated 104.6 07/30/2017 02:35 AM    VLDL, calculated 17.4 07/30/2017 02:35 AM    Triglyceride 87 07/30/2017 02:35 AM    CHOL/HDL Ratio 5.7 07/30/2017 02:35 AM                  Procedures/imaging: see electronic medical records for all procedures/Xrays and details which were not copied into this note but were reviewed prior to creation of [address] [place] [address], DO  Internal Medicine/Geriatrics

## 2017-08-08 NOTE — ROUTINE PROCESS
Bedside and Verbal shift change report given to Alexander Briseno RN  (oncoming nurse) by  WILLIE Chirinos RN  (offgoing nurse). Report included the following information SBAR, Kardex, Recent Results and Med Rec Status.

## 2017-08-08 NOTE — ROUTINE PROCESS
2041 assumed care of pt after bedside verbal report was given by of going nurse, normal saline infusing at 100 ml/hr, no acute distress noted    1022 pt in bed watching tv, no distress noted, will monitor     1227 pt sitting up in bed quietly, no distress noted, pt states he does not want to eat lunch    1538 Bedside and Verbal shift change report given to [employer] (oncoming nurse) by MICHAEL Gonzales (offgoing nurse). Report included the following information SBAR, Kardex and MAR.

## 2017-08-09 NOTE — PROGRESS NOTES
Bedside and Verbal shift change report given to [address] [place] [address] (oncoming nurse) by Ana Mcgovern RN (offgoing nurse). Report included the following information SBAR and Kardex.      Patient Vitals for the past 12 hrs:   Temp Pulse Resp BP SpO2   08/08/17 2337 98.3 °F (36.8 °C) 78 16 140/68 100 %   08/08/17 1919 98.6 °F (37 °C) 100 16 132/63 100 %   08/08/17 1726 98.1 °F (36.7 °C) 96 16 124/63 100 %

## 2017-08-09 NOTE — PROGRESS NOTES
0105-Awake, resting quietly in bed. Stable. Ace wrap, right foot, intact, no drainage. Brand Boas drain in place; patent, scant drainage noted. Left leg on pillow. SCD's in place. Positive popliteal and post tibial pulse with doppler; see assessment. No open areas noted to skin. Call light and urinal within reach. Assessment complete. 0220-Medicated for pain at request.  Resting quietly in bed.    0508-No change from initial assessment. Stable. 0607-Resting quietly. No complaints. Shift Summary:  Rested quietly throughout shift. Stable.

## 2017-08-09 NOTE — PROGRESS NOTES
conducted a Follow up consultation and Spiritual Assessment for Maulik Smart, who is a 46 y. o.,male. Continued the relationship of care and support. Listened empathically. Offered prayer and assurance of continued prayer on patients behalf. Plan:  Chaplains will continue to follow and will provide pastoral care as needed or requested.  recommends bedside caregivers page the  on duty if patient shows signs of acute spiritual or emotional distress. Father FELI QuesadaDiv.   [facility] - Office

## 2017-08-09 NOTE — PROGRESS NOTES
Problem: Mobility Impaired (Adult and Pediatric)  Goal: *Acute Goals and Plan of Care (Insert Text)  In 1-7 days pt will be able to perform:  ST. Bed mobility: Rolling L to R to L modified independent for positioning. 2. Supine to sit to supine S with HR for meals. 3. Sit to stand to sit S with RW in prep for ambulation. LT. Gait: Ambulate >150ft S with RW, L NWB, for home/community mobility. 2. Stair Negotiation: Ascend/descend >3 steps L NWB CGA with HRs for home entry. 3. Activity tolerance: Tolerate up in chair 1-2 hours for ADLs. 4. Patient/Family Education: Patient/family to be independent with HEP for follow-up care and safe discharge. 17 Updated goals to be met in 7 days:  ST. Bed mobility: Rolling L to R to L modified independent for positioning. 2. Supine to sit to supine S with HR for meals. 3. Sit to stand to sit S with RW in prep for ambulation. LT. Gait: Ambulate >150ft S with RW, L WBAT, for home/community mobility. 2. Stair Negotiation: Ascend/descend >3 steps L WBAT CGA with HRs for home entry. 3. Activity tolerance: Tolerate up in chair 1-2 hours for ADLs. 4. Patient/Family Education: Patient/family to be independent with HEP for follow-up care and safe discharge. Physical Therapy Goals LT/ST  Initiated 2017 and to be accomplished within 3-5 day(s)  1. Patient will move from supine <> sit with S in prep for out of bed activity and change of position. 2. Patient will perform sit<> stand with S with LRAD in prep for transfers/ambulation. 3. Patient will transfer from bed <> chair with S with LRAD for time up in chair for completion of ADL activity. 4. Patient will ambulate 150 feet with LRAD/S for improved functional mobility/safe discharge. .   5. Patient will ascend/descend 3-5 stairs with handrail L NWB withcontact guard assist for home re-entry as needed. 6. Patient/Family will be independent w/ HEP for follow-up care and safe d/c.    Outcome: Progressing Towards Goal  PHYSICAL THERAPY TREATMENT     Patient: Talisha Walters (58 y.o. male)  Date: 8/9/2017  Diagnosis: Cellulitis  CELLULITIS OF LEFT FOOT  infected left foot Cellulitis  Procedure(s) (LRB):  INCISION AND DRAINAGE LEFT FOOT WITH TRANSMETATARSAL AMPUTATION (Left) 3 Days Post-Op  Precautions: Fall, NWB   Chart, physical therapy assessment, plan of care and goals were reviewed. ASSESSMENT:  Attempted axillary crutch training. However pt unable to coordinate safety and is unsteady. Pt has 1x LOB posterior and Min A to recover. Pt prefers [employer] and is safer. RW recommended for home use. Pt able to maintain NWB, however fatigues quickly today. Pt reporting increase pain and also tooth pain. Tooth pain present for the past 2 days. Pt reporting 7/10. Assisted pt back to supine. Cont POC . Progression toward goals:  [ ]      Improving appropriately and progressing toward goals  [X]      Improving slowly and progressing toward goals  [ ]      Not making progress toward goals and plan of care will be adjusted       PLAN:  Patient continues to benefit from skilled intervention to address the above impairments. Continue treatment per established plan of care. Discharge Recommendations:  Home Health  Further Equipment Recommendations for Discharge:  rolling walker       SUBJECTIVE:   Patient stated  I'm tired today       OBJECTIVE DATA SUMMARY:   Critical Behavior:  Neurologic State: Alert  Orientation Level: Oriented X4  Cognition: Appropriate for age attention/concentration, Follows commands  Safety/Judgement: Fall prevention  Functional Mobility Training:  Bed Mobility:  Supine to Sit: Supervision  Sit to Supine: Supervision  Transfers:  Sit to Stand: Contact guard assistance  Stand to Sit: Contact guard assistance  Balance:  Sitting: Intact  Standing: Intact; With support  Ambulation/Gait Training:  Distance (ft): 120 Feet (ft)  Assistive Device: Walker, rolling;Gait belt  Ambulation - Level of Assistance: Contact guard assistance  Gait Abnormalities: Decreased step clearance; Path deviations  Left Side Weight Bearing: Non-weight bearing  Base of Support: Shift to right  Stance: Right increased  Speed/Karlie: Slow  Step Length: Right shortened     Pain:  Pain Scale 1: Numeric (0 - 10)  Pain Intensity 1: 5  Pain Location 1: Teeth  Pain Orientation 1: Left  Pain Description 1: Aching  Pain Intervention(s) 1: Rest  Activity Tolerance:   Fair      After treatment:   [ ] Patient left in no apparent distress sitting up in chair  [X] Patient left in no apparent distress in bed  [X] Call bell left within reach  [ ] Nursing notified  [X] Caregiver present(Niece)   [ ] Bed alarm activated      Jeb Cook PTA   Time Calculation: 25 mins

## 2017-08-09 NOTE — PROGRESS NOTES
Hospitalist Progress Note    Patient: Sylvester Carrizales MRN: [id number]  CSN: [id number]    YOB: 1965  Age: 46 y.o. Sex: male    DOA: 7/24/2017 LOS:  LOS: 16 days          Chief Complaint:    Skin Problem    Assessment/Plan     Hospital Problems  Date Reviewed: 8/6/2017          Codes Class Noted POA    PAD (peripheral artery disease) ([facility] ICD-10-CM: I73.9  ICD-9-CM: 443.9  8/2/2017 Unknown        Blind left eye ICD-10-CM: H54.42  ICD-9-CM: 369.60  7/31/2017 Unknown        * (Principal)Cellulitis ICD-10-CM: L03.90  ICD-9-CM: 682.9  7/24/2017 Unknown        Hyperglycemia due to type 2 diabetes mellitus ([facility] ICD-10-CM: E11.65  ICD-9-CM: 250.00  7/24/2017 Unknown        Hyponatremia ICD-10-CM: E87.1  ICD-9-CM: 276.1  7/24/2017 Unknown              Sylvester Form is a 46 y.o. male who was admitted on 7/24/17 for left foot cellulitis after stepping on a nail 3 days prior to admission. He was noted to be tachycardic w/ WBC 16.7k and was given initial fluid challenge in ED. His A1c was noted to be 11.2% on admission which represents a new DM diagnosis for him. He is s/p I&D with debridement on 7/27/17 by podiatry.           Cellulitis (7/24/2017)  - CT of left foot on 7/25/17 w/ suspicion for osteomyelitis  - s/p debridement on 7/27 by podiatry  - off of abx now  - non-weightbearing continues  - wound care per podiatry recs    DM2  -  Trying to transition to affordable outpatient regimen  - tolerating PO meds now  - will transition to 70/30 w/ SSI prn coverage starting tomorrow  - titrate as needed; start with 10 units BID w/ ssi   - Inpatient goal: Glucose 140-180mg/dL  - Latest A1c: 11.2% on admission       Hyponatremia (7/24/2017)  - resolved    PT/OT    CM for DC planning    FENGI: NS @ 100cc/hr; p-lvx; diabetic diet; no GI prophylaxis needed    Dispo: DC when BG stabilized; 48h anticipated      Subjective:    No new complaints    Review of systems:    Constitutional: denies fevers, chills, myalgias, diaphoresis  Respiratory: denies shortness of breath, cough, wheezing  Cardiovascular: denies chest pain, palpitations  Gastrointestinal: denies nausea, vomiting, diarrhea, constipation      Vital signs/Intake and Output:  Visit Vitals    /64    Pulse 91    Temp 97.3 °F (36.3 °C)    Resp 16    Ht 5' 10\" (1.778 m)    Wt 84.6 kg (186 lb 9.6 oz)    SpO2 100%    BMI 26.03 kg/m2     Current Shift:  08/09 0701 - 08/09 1900  In: 360 [P.O.:360]  Out: -   Last three shifts:  08/07 1901 - 08/09 0700  In: 3158.5 [P.O.:360; I.V.:2798.5]  Out: 2560 [Urine:2550; Drains:10]    Exam:    General: Awake and alert, no acute distress, resting comfortably in bed  Head: Atraumatic, normocephalic  Eyes: PERRLA, EOMI, sclerae non-icteric  ENT: mucous membranes moist, palate elevates evenly, tongue midline  Neck: supple, no masses, no lymphadenopathy, no rigidity   CVS:Regular rate and rhythm, no murmurs, rubs, gallops, or clicks  Lungs:Clear to auscultation bilaterally, no wheezes, rales, or rhonchi  Abdomen: Soft, Nontender, nondistended, bowel sounds normal throughout  Extremities: left foot dressed, clean, dry,  pulses 1+ all other ext; strength 5/5 all ext  Skin: as above, otherwise warm, dry, well perfused, without rash; no cyanosis or clubbing  Neuro: CN II-XII grossly intact, no focal neurologic deficits  Psych: awake, alert, and oriented x 3; full range of mood and affect                Labs: Results:       Chemistry Recent Labs      08/09/17   0244  08/08/17   0043  08/07/17   0252   GLU  88  200*  119*   NA  138  136  136   K  3.6  3.7  3.8   CL  102  101  100   CO2  25  28  26   BUN  10  13  15   CREA  0.80  1.01  1.42*   CA  8.2*  8.1*  8.2*   AGAP  11  7  10   BUCR  13  13  11*      CBC w/Diff Recent Labs      08/07/17   0252   WBC  8.1   RBC  3.71*   HGB  10.7*   HCT  32.4*   PLT  376   GRANS  63   LYMPH  20*   EOS  2      Cardiac Enzymes No results for input(s): CPK, CKND1, KARY in the last 72 hours.    No lab exists for component: CKRMB, TROIP   Coagulation No results for input(s): PTP, INR, APTT in the last 72 hours. No lab exists for component: INREXT, INREXT    Lipid Panel Lab Results   Component Value Date/Time    Cholesterol, total 148 07/30/2017 02:35 AM    HDL Cholesterol 26 07/30/2017 02:35 AM    LDL, calculated 104.6 07/30/2017 02:35 AM    VLDL, calculated 17.4 07/30/2017 02:35 AM    Triglyceride 87 07/30/2017 02:35 AM    CHOL/HDL Ratio 5.7 07/30/2017 02:35 AM      BNP No results for input(s): BNPP in the last 72 hours. Liver Enzymes No results for input(s): TP, ALB, TBIL, AP, SGOT, GPT in the last 72 hours.     No lab exists for component: DBIL   Thyroid Studies No results found for: T4, T3U, TSH, TSHEXT, TSHEXT     Procedures/imaging: see electronic medical records for all procedures/Xrays and details which were not copied into this note but were reviewed prior to creation of [facility], DO

## 2017-08-09 NOTE — ROUTINE PROCESS
Bedside and Verbal shift change report given to Natasha Mosquera RN (oncoming nurse) by Laquita Govea RN (offgoing nurse). Report included the following information SBAR and Kardex.

## 2017-08-09 NOTE — DIABETES MGMT
GLYCEMIC CONTROL PROGRESS NOTE:    -pt new diagnosis of DM, most likely Type 2  -glycemic control progressing, with pt requiring minimal corrective coverage  -pt responding well to Metformin + Glipizide therapy  -Lantus decreased to 20 units, Humalog 5 units AC TID  -Concern with pairing mixed insulin with Sulfonylureas and discharging pt on this insulin regimen  -would recommend to continue to optimize oral regimen with corrective coverage only  -pt TDD has decreased by 50% since initiation of orals with opportunity to titrate both Metformin & Glipizide  -inpatient regimen changed to Novolin 70/30 10 units before breakfast & dinner to start tomorrow    Recent Glucose Results:   Lab Results   Component Value Date/Time    GLU 88 08/09/2017 02:44 AM    GLUCPOC 82 08/09/2017 11:19 AM    GLUCPOC 102 08/09/2017 07:31 AM    GLUCPOC 97 08/08/2017 09:09 PM               Eddie Posada RN, MS  Glycemic Control Team

## 2017-08-10 NOTE — PROGRESS NOTES
Problem: Self Care Deficits Care Plan (Adult)  Goal: *Acute Goals and Plan of Care (Insert Text)  Occupational Therapy Goals  Initiated 8/7/2017 within 7 day(s). 1. Patient will perform lower body dressing with supervision/set-up   2. Patient will perform toilet transfers with supervision/set-up. 3. Patient will participate in standing with supervision/set-up for 5 minutes during ADL to increase activity tolerance for functional activity. 4. Patient will maintain NWB precautions during functional activities with min verbal cues. Outcome: Progressing Towards Goal  OCCUPATIONAL THERAPY TREATMENT     Patient: Corinne Lively (8 y.o. male)  Date: 8/10/2017  Diagnosis: Cellulitis  CELLULITIS OF LEFT FOOT  infected left foot Cellulitis  Procedure(s) (LRB):  INCISION AND DRAINAGE LEFT FOOT WITH TRANSMETATARSAL AMPUTATION (Left) 4 Days Post-Op  Precautions: Fall, NWB  Chart, occupational therapy assessment, plan of care, and goals were reviewed. ASSESSMENT:  Pt is making good progress towards goals. Pt now supervision with toilet transfers and toileting routine using RW; pt is compliant with NWB status. Pt plans to return home with niece. Progression toward goals:  [X]          Improving appropriately and progressing toward goals  [ ]          Improving slowly and progressing toward goals  [ ]          Not making progress toward goals and plan of care will be adjusted       PLAN:  Patient continues to benefit from skilled intervention to address the above impairments. Continue treatment per established plan of care. Discharge Recommendations:  Home Health  Further Equipment Recommendations for Discharge:  bedside commode, rolling walker and AE as needed. SUBJECTIVE:   Patient stated I speak little [place].       OBJECTIVE DATA SUMMARY:   Cognitive/Behavioral Status:  Neurologic State: Alert  Orientation Level: Oriented X4  Cognition: Appropriate for age attention/concentration, Follows commands  Safety/Judgement: Fall prevention  Functional Mobility and Transfers for ADLs:              Bed Mobility:  Rolling: Independent  Supine to Sit: Independent  Sit to Supine: Independent  Scooting: Supervision              Transfers:  Sit to Stand: Supervision     Bed to Chair: Supervision                                Toilet Transfer : Supervision                                            Bathroom Mobility: Supervision/set up (RW with NWB status)                 Balance:  Sitting: Intact  Standing: Intact; With support  ADL Intervention:     Lower Body Dressing Assistance  Underpants: Supervision/set-up     Toileting  Toileting Assistance: Supervision/set up  Clothing Management: Supervision/set-up  Cues: Visual cues provided  Adaptive Equipment: Walker (grab bar)        Pain:  Pre-treatment: \"little\"  Post-treatment: \"little\"     Activity Tolerance:    Fair  Please refer to the flowsheet for vital signs taken during this treatment.   After treatment:   [ ]  Patient left in no apparent distress sitting up in chair  [X]  Patient left in no apparent distress in bed  [ ]  Call bell left within reach  [ ]  Nursing notified  [ ]  Caregiver present  [ ]  Bed alarm activated     Thank you for this referral.  Kanwal Damon OTR/L  Time Calculation: 23 mins

## 2017-08-10 NOTE — PROGRESS NOTES
No complaints over night, Blood sugar did drop a little bit with AM labs to 69. Patient was awakened and given orange juice and apple juice with pain medication. He did not eat dinner last night. Crackers were given for him to snack on as well.       Intake/Output Summary (Last 24 hours) at 08/10/17 0756  Last data filed at 08/10/17 0748   Gross per 24 hour   Intake             2714 ml   Output             2925 ml   Net             -211 ml       Patient Vitals for the past 12 hrs:   Temp Pulse Resp BP SpO2   08/10/17 0748 97.8 °F (36.6 °C) 81 18 144/65 100 %   08/10/17 0349 97.9 °F (36.6 °C) 76 18 121/65 100 %   08/10/17 0014 98.2 °F (36.8 °C) 92 18 130/71 100 %   08/09/17 2011 98.4 °F (36.9 °C) 87 18 142/79 100 %

## 2017-08-10 NOTE — PROGRESS NOTES
NUTRITION FOLLOW-UP    RECOMMENDATIONS/PLAN:   Continue w/ POC  Monitor labs/lytes, wt, fluid status, skin integrity, and PO Intake    NUTRITION ASSESSMENT:   Client Update: 46 yrs old Male with gangrene R foot w/ associated cellulitis & osteomyelitis sp TMA on 8/6/17. Per MD on 8/6/17 hyponatremia resolved. Pt also noted to came in with hyperglycemia. Hg1A 11.2 per MD note pt was unaware of DM diagnosis. DM Educator following pt. Spoke w/ nurse who reports pt still eating well. FOOD/NUTRITION INTAKE   Diet Order:  Jeremi 1800, Reg   Supplements: n/a   Food Allergies: NKFA/  Average PO Intake:      Patient Vitals for the past 100 hrs:   % Diet Eaten   08/09/17 1450 100 %   08/09/17 0923 75 %   08/07/17 1836 0 %   08/07/17 1549 100 %   08/07/17 0818 75 %      Pertinent Medications:  [x] Reviewed; lovenox, humalog, metformin, MVI, zofran  Insulin:  []SSI  [x]Pre-meal   []Basal    []Drip  []None  Cultural/Adventist Food Preferences: None Identified    BIOCHEMICAL DATA & MEDICAL TESTS  Pertinent Labs:  [x] Reviewed; ANTHROPOMETRICS  Height: 5' 10\" (177.8 cm)       Weight: 85.6 kg (188 lb 11.4 oz)         BMI: 26.3 kg/m^2 overweight (25.0%-29.9% BMI)   Adm Weight: 190 lbs                Weight change: -2lbs since being admitted   Adjusted Body Weight: 138lbs  NUTRITION-FOCUSED PHYSICAL ASSESSMENT  Skin: No pressure injury noted      GI: +BM 8/8/17  NUTRITION PRESCRIPTION  Calories: 2172 kcal/day based on [place] X 1.4  Protein:  g/day based on 1.3-1.5 g/kg  CHO: 272 g/day based on 50% of total energy  Fluid: 2172 ml/day based on 1 kcal/ml       NUTRITION DIAGNOSES:   1. Food and nutrition-related knowledge deficit related to DM diagnosis evidenced by dx of new-onset DM    NUTRITION INTERVENTIONS:   INTERVENTIONS:        GOALS:  1. Continue w/ POC 1.  Meet PO Intake >75% by next review 3 days             LEARNING NEEDS (Diet, Supplementation, Food/Nutrient-Drug Interaction):   [x] None Identified   [] Education provided/documented      Identified and patient: [] Declined   [] Was not appropriate/indicated        NUTRITION MONITORING /EVALUATION:   Follow PO intake  Monitor wt  Monitor renal labs, electrolytes, fluid status     Previous Recommendations Implemented: yes        Previous Goals Met:  yes -continues to meet goals of consuming >75 PO Intake      [] Participated in Interdisciplinary Rounds    [x] Interdisciplinary Care Plan Reviewed  DISCHARGE NUTRITION RECOMMENDATIONS ADDRESSED:     [x] Yes- recommended Diabetic Consistent Carb diet     NUTRITION RISK:           [x] At risk                        [x] Not currently at risk        Will follow-up per policy.   Nany Penny RD  PAGER:  [phone]

## 2017-08-10 NOTE — PROGRESS NOTES
Problem: Mobility Impaired (Adult and Pediatric)  Goal: *Acute Goals and Plan of Care (Insert Text)  In 1-7 days pt will be able to perform:  ST. Bed mobility: Rolling L to R to L modified independent for positioning. 2. Supine to sit to supine S with HR for meals. 3. Sit to stand to sit S with RW in prep for ambulation. LT. Gait: Ambulate >150ft S with RW, L NWB, for home/community mobility. 2. Stair Negotiation: Ascend/descend >3 steps L NWB CGA with HRs for home entry. 3. Activity tolerance: Tolerate up in chair 1-2 hours for ADLs. 4. Patient/Family Education: Patient/family to be independent with HEP for follow-up care and safe discharge. 17 Updated goals to be met in 7 days:  ST. Bed mobility: Rolling L to R to L modified independent for positioning. 2. Supine to sit to supine S with HR for meals. 3. Sit to stand to sit S with RW in prep for ambulation. LT. Gait: Ambulate >150ft S with RW, L WBAT, for home/community mobility. 2. Stair Negotiation: Ascend/descend >3 steps L WBAT CGA with HRs for home entry. 3. Activity tolerance: Tolerate up in chair 1-2 hours for ADLs. 4. Patient/Family Education: Patient/family to be independent with HEP for follow-up care and safe discharge. Physical Therapy Goals LT/ST  Initiated 2017 and to be accomplished within 3-5 day(s)  1. Patient will move from supine <> sit with S in prep for out of bed activity and change of position. 2. Patient will perform sit<> stand with S with LRAD in prep for transfers/ambulation. 3. Patient will transfer from bed <> chair with S with LRAD for time up in chair for completion of ADL activity. 4. Patient will ambulate 150 feet with LRAD/S for improved functional mobility/safe discharge. .   5. Patient will ascend/descend 3-5 stairs with handrail L NWB withcontact guard assist for home re-entry as needed. 6. Patient/Family will be independent w/ HEP for follow-up care and safe d/c.    Outcome: Progressing Towards Goal  PHYSICAL THERAPY TREATMENT     Patient: Maulik Smart (9 y.o. male)  Date: 8/10/2017  Diagnosis: Cellulitis  CELLULITIS OF LEFT FOOT  infected left foot Cellulitis  Procedure(s) (LRB):  INCISION AND DRAINAGE LEFT FOOT WITH TRANSMETATARSAL AMPUTATION (Left) 4 Days Post-Op  Precautions: Fall, NWB   Chart, physical therapy assessment, plan of care and goals were reviewed. ASSESSMENT:  Pt with increase fatigue today, and not wanting to ambulate much as a result. Pain seems to be managed well. No complaints today. [facility] Pt performed box step just in case there is a step/ curb necessary when entering CGA needed for safety. VCs for sequencing. Family present and education provided. Cont POC. Progression toward goals:  [ ]      Improving appropriately and progressing toward goals  [X]      Improving slowly and progressing toward goals  [ ]      Not making progress toward goals and plan of care will be adjusted       PLAN:  Patient continues to benefit from skilled intervention to address the above impairments. Continue treatment per established plan of care. Discharge Recommendations:  Home Health  Further Equipment Recommendations for Discharge:  rolling walker       SUBJECTIVE:   Patient stated  I cant do too much      OBJECTIVE DATA SUMMARY:   Critical Behavior:  Neurologic State: Alert  Orientation Level: Oriented X4  Cognition: Appropriate for age attention/concentration, Follows commands  Safety/Judgement: Fall prevention  Functional Mobility Training:  Bed Mobility:  Rolling: Supervision  Supine to Sit: Supervision  Sit to Supine: Supervision  Scooting: Supervision  Transfers:  Sit to Stand: Contact guard assistance  Stand to Sit: Contact guard assistance  Balance:  Sitting: Intact  Standing: Intact; With support  Ambulation/Gait Training:  Distance (ft): 100 Feet (ft)  Assistive Device: Walker, rolling;Gait belt  Ambulation - Level of Assistance: Contact guard assistance  Gait Abnormalities: Decreased step clearance  Left Side Weight Bearing: Non-weight bearing  Base of Support: Shift to right  Speed/Karlie: Slow  Step Length: Right shortened     Stairs:  Number of Stairs Trained: 1 (box step with RW )  Stairs - Level of Assistance: Contact guard assistance  Activity Tolerance:   Fair      After treatment:   [ ] Patient left in no apparent distress sitting up in chair  [X] Patient left in no apparent distress in bed  [X] Call bell left within reach  [X] Nursing notified  [ ] Caregiver present  [ ] Bed alarm activated      Berry Stahl PTA   Time Calculation: 25 mins

## 2017-08-10 NOTE — DIABETES MGMT
GLYCEMIC CONTROL & NUTRITION:    - recommend d/c 70/30 insulin 10 units BID, BG 69 on labs this AM  - recommend continue with Glipizde, Metformin & Humalog corrective coverage only   - goal is to optimize oral regimen for d/c to prevent need for insulin at home  - discussed with Dr. Du Iverson    Recent Glucose Results:   Lab Results   Component Value Date/Time    GLU 69 (L) 08/10/2017 02:53 AM    GLUCPOC 95 08/10/2017 07:46 AM    GLUCPOC 101 08/09/2017 09:21 PM    GLUCPOC 121 (H) 08/09/2017 04:36 PM               Mari Falk, MPH, RD, CDE

## 2017-08-10 NOTE — PROGRESS NOTES
Met with pt and his family at bedside. Pt has now decided he will remain in the area in an area hotel ([address] [place] [address]: [address] [place], [place] [address], [address] [place] [address]). Pt's niece, Fatou Deal ([phone]), will be staying with him to assist.  CM has contacted [employer] to assist with screen/finical application for finical assistance given pt is self pay. Pt has been provided a donated rolling walker, last resort medication assistance ($ 47.50), and [facility]. Pt has a follow up appointment with Dr. Frances Robert (8/14/17 at 3:00pm) and an appointment at [facility] (8/29/17 at 10:am). Pt and family have also been provided the information for the [facility] schedule for August in the event he needs to be seen before his [facility] appointment. Pt's family will transport pt home today. No other plan of care needs have been identified.

## 2017-08-10 NOTE — ROUTINE PROCESS
Bedside and Verbal shift change report given to MICHAEL Duckworth (oncoming nurse) by David Cancino RN (offgoing nurse). Report included the following information SBAR, Kardex, ED Summary, Procedure Summary, Intake/Output, MAR, Accordion, Recent Results and Med Rec Status Options for questions were provided to the oncoming RN as well as the patient.

## 2017-08-10 NOTE — ROUTINE PROCESS
Bedside report received from Shantal Matthew RN. Assumed care of patient. Pt found resting in bed. Call light with in reach.

## 2017-08-10 NOTE — PROGRESS NOTES
Patient was visited by AMARJIT. Volunteer followed up with patient and/or family and reported no needs to this . Chaplains will continue to follow and will provide pastoral care as needed or requested. Rev.  [facility]  [phone]

## 2017-08-10 NOTE — PROGRESS NOTES
0724 Assessment complete, see doc flow sheet. 1032 PAIN medication given. Potential medication side effects explained to patient, patient verbalizes understanding, opportunities for questions provided. Patient stable, no apparent distress at this time, bed in locked position, call bell within reach. 3372 Patient reassessed at this time. No changes noted. Shift summary: Patient had uneventful shift. Patient ambulated with assistance. Pain remained well-controlled with medication.  No issues/concerns at this time

## 2017-08-10 NOTE — ROUTINE PROCESS
Bedside shift change report given to Woodrow Mercado RN (oncoming nurse) by Joon Butcher Rn (offgoing nurse). Report included the following information SBAR, Kardex and OR Summary.

## 2017-08-10 NOTE — DIABETES MGMT
GLYCEMIC CONTROL EDUCATION NOTE:    -pt new diagnosis of DM, most likely Type 2  -glycemic control progressing, with pt requiring no corrective coverage  -pt responding well to Metformin + Glipizide therapy with BG in target range  -pt previously provided & educated on how Glucometer  -wife/niece at bedside for continuation of in depth education (niece served as )  -pt verbalized that he feels comfortable with SBGM  -pt educated on medications, how to take, purpose and importance of taking with meals, discussed safety risks of sulfonylureas & biguanides  -discussed importance of follow up with OP PCM for DM medication management  -discussed importance of glycemic control during healing process, encouraged to check BG at least BID at home, call OP PCM if see's numbers at home that are out of target  -pt provided with Malay language DM education materials: overview of DM, DM disease process, how to read a food label, carbohydrate containing foods, blood glucose targets, & DM medications  -pt/family verbalized understanding of all items discussed & states no further questions at this time     Recent Glucose Results:   Lab Results   Component Value Date/Time    GLU 69 (L) 08/10/2017 02:53 AM    GLUCPOC 146 (H) 08/10/2017 12:01 PM    GLUCPOC 95 08/10/2017 07:46 AM    GLUCPOC 101 08/09/2017 09:21 PM     Peter Leung RN, MS  Glycemic Control Team

## 2017-08-10 NOTE — DISCHARGE INSTRUCTIONS
Diabetic diet  NO weight on left foot!!!  Use walker        Lab Results   Component Value Date/Time    Hemoglobin A1c 11.2 07/24/2017 04:50 PM       This lab test reflects that your blood sugar has been slightly elevated over the past 3 months and should be evaluated by your primary care provider. An A1C of 5.7-6.4% meets the criteria for pre-diabetes; an A1C of 6.5% or higher meets the criteria for diabetes. This lab test reflects that your blood sugar averaged 275 mg/dL  over the past 3 months. It is important to follow up with your provider on a routine basis to continue to evaluate your blood sugar and discuss any necessary changes in treatment. DISCHARGE SUMMARY from Nurse    The following personal items are in your possession at time of discharge:    Dental Appliances: None  Visual Aid: None        Jewelry: None  Clothing: None  Other Valuables: None             PATIENT INSTRUCTIONS:    After general anesthesia or intravenous sedation, for 24 hours or while taking prescription Narcotics:  · Limit your activities  · Do not drive and operate hazardous machinery  · Do not make important personal or business decisions  · Do  not drink alcoholic beverages  · If you have not urinated within 8 hours after discharge, please contact your surgeon on call. Report the following to your surgeon:  · Excessive pain, swelling, redness or odor of or around the surgical area  · Temperature over 100.5  · Nausea and vomiting lasting longer than 4 hours or if unable to take medications  · Any signs of decreased circulation or nerve impairment to extremity: change in color, persistent  numbness, tingling, coldness or increase pain  · Any questions        What to do at Home:  Recommended activity: Activity as tolerated, no weight bearing on right foot    If you experience any of the following symptoms increased pain not relieved by medication, fever over 100.5, increased drainage, please follow up with PCP.       * Please give a list of your current medications to your Primary Care Provider. *  Please update this list whenever your medications are discontinued, doses are      changed, or new medications (including over-the-counter products) are added. *  Please carry medication information at all times in case of emergency situations. These are general instructions for a healthy lifestyle:    No smoking/ No tobacco products/ Avoid exposure to second hand smoke    Surgeon General's Warning:  Quitting smoking now greatly reduces serious risk to your health. Obesity, smoking, and sedentary lifestyle greatly increases your risk for illness    A healthy diet, regular physical exercise & weight monitoring are important for maintaining a healthy lifestyle    You may be retaining fluid if you have a history of heart failure or if you experience any of the following symptoms:  Weight gain of 3 pounds or more overnight or 5 pounds in a week, increased swelling in our hands or feet or shortness of breath while lying flat in bed. Please call your doctor as soon as you notice any of these symptoms; do not wait until your next office visit. Recognize signs and symptoms of STROKE:    F-face looks uneven    A-arms unable to move or move unevenly    S-speech slurred or non-existent    T-time-call 911 as soon as signs and symptoms begin-DO NOT go       Back to bed or wait to see if you get better-TIME IS BRAIN. Warning Signs of HEART ATTACK     Call 911 if you have these symptoms:   Chest discomfort. Most heart attacks involve discomfort in the center of the chest that lasts more than a few minutes, or that goes away and comes back. It can feel like uncomfortable pressure, squeezing, fullness, or pain.  Discomfort in other areas of the upper body. Symptoms can include pain or discomfort in one or both arms, the back, neck, jaw, or stomach.  Shortness of breath with or without chest discomfort.    Other signs may include breaking out in a cold sweat, nausea, or lightheadedness. Don't wait more than five minutes to call 911 - MINUTES MATTER! Fast action can save your life. Calling 911 is almost always the fastest way to get lifesaving treatment. Emergency Medical Services staff can begin treatment when they arrive -- up to an hour sooner than if someone gets to the hospital by car. The discharge information has been reviewed with the patient. The patient verbalized understanding. Discharge medications reviewed with the patient and niece and appropriate educational materials and side effects teaching were provided.         Patient armband removed and shredded]

## 2017-08-11 NOTE — DISCHARGE SUMMARY
[facility] SUMMARY    Name:  Portia Beaulieu  MR#:  [id number]  :  1965  Account #:  [de-identified]  Date of Adm:  2017  Date of Discharge:  08/10/2017      DIAGNOSES AT DISCHARGE:  1. Osteomyelitis and infected ulcer of the foot. 2. Uncontrolled diabetes mellitus. PROCEDURES PERFORMED:  1. Transmetatarsal surgery of the left foot secondary to abscess  gangrene and osteomyelitis. 2. Peripheral vascular disease. MEDICATIONS FOR DISCHARGE:  Include the followin. Augmentin 875, one tablet twice daily for 7 days. 2. Plavix 75 mg daily. 3. Glucotrol 10 mg twice daily. 4. Glucophage 1000 mg twice daily. 5. Zocor 40 mg at night. HOSPITAL SUMMARY:  1. The patient is a 53-year-old  gentleman who came in  because he had stepped on something. It may have been a sharp  object like a nail. He had an infected area on his foot. He had cellulitis,  was found to have osteomyelitis. Podiatry and vascular were on the  case. He had an atherectomy and balloon angioplasty of the anterior  tibial artery and peroneal artery. He was discovered to be a diabetic  with an A1c over 11%, which he did not know about prior and he was  set up for surgery with podiatry. After his vascular procedure, his leg  did well, but his toes consistent with gangrenous changes. Obviously it  was necessary to proceed to surgery for excision of these areas, and  he underwent a transmetatarsal amputation. He did well with that. He  completed a course of intravenous antibiotics. He has been  transitioned off insulin here and is now on oral medications for his  diabetes and is doing well with that. The patient has been told to be  nonweightbearing and he has got a walker that he can use. His foot is  dressed. It was checked yesterday by podiatry. Drain is in place, and  he is going to see Dr. Alyssa Bronson on  for followup.  At this point,  his blood pressure is stable at 150/70, pulse 86, temperature 98.2,  respiratory rate is 18, SaO2 is 99%. He has had no primary care  followup, but his family is arranging for that for him within the next  week to make sure that he can get attention to his diabetes, blood  pressure, cholesterol, and other risk factors regarding coronary and  vascular disease. The foot is dressed and stable at this point. His last  hemoglobin and hematocrit 10.7 and 32.1, platelets are 467. His last  metabolic profile was unremarkable with a potassium of 3.4, BUN and  creatinine 8 and 0.86, mag was normal at 1.8. Last culture from the 6th  showed rare enterococcus. This is from the foot and wound. Culture  finalized on the 10th showed rare Klebsiella pneumonia, but, overall,  the wound is improved with no active drainage and no clinical  symptoms of infection and with excision of the infected areas, at this  point, we will transition to oral antibiotics as he has an extensive  course of IV treatment at this point. Today, he is awake and alert, in no  acute distress. He is oriented x3. His daughter helps translate for him. He does speak partial [place]. His chest and cardiac exam are within  normal limits. His abdomen is benign. He denies nausea, vomiting,  diarrhea. No chest pain or shortness of breath, and he is up and  ambulatory with the help of the rolling walker. PLAN: Our plan is to discharge him today. He [facility] followup at the  [facility] and get an appointment there within 1 week regarding his  diabetes. He has had extensive diabetic teaching here with the  diabetes nursing group. He has been given a monitor to check his  blood sugars at home. He has been given instructions on meal  planning, blood sugar emergencies, alcohol in relation to diabetes, diet  guideline and nutrition tips. He currently is on a medication regimen of Glucophage and glucotrol,  which he will continue at home:  1. 1000 mg Glucophage twice daily,  2. Glucotrol 10 mg twice daily.   3. Plavix 75 mg daily. We will change him to Zocor 40 mg daily because I do not think he is  going to be able to afford the Lipitor with no insurance and, otherwise,  arrangements have been made for him to go [facility] on August 29, it looks like, which is just a new update; and followup with Dr. Sanjay Rodriguez on August 14. I am prescribing Augmentin 875 twice daily for  another 7 days as an antibiotic for his foot and the prescriptions as  noted above. He is stable for discharge from the hospital today. He  needs closed primary care followup in regards to further diabetic  treatment and, at this point, his infected foot is stable postoperatively,  also for outpatient evaluation and treatment. He is to be  nonweightbearing at this point in time.         MD VENKATA العلي / Jarod Bragg  D:  08/10/2017   13:03  T:  08/11/2017   08:02  Job #:  [id number]

## 2018-01-01 ENCOUNTER — HOSPITAL ENCOUNTER (EMERGENCY)
Age: 53
End: 2018-06-26
Attending: EMERGENCY MEDICINE
Payer: SELF-PAY

## 2018-01-01 DIAGNOSIS — I46.9 CARDIOPULMONARY ARREST (HCC): Primary | ICD-10-CM

## 2018-01-01 PROCEDURE — 99285 EMERGENCY DEPT VISIT HI MDM: CPT

## 2018-01-01 PROCEDURE — 77030020454 HC TU ET CUF HI/LO COVD -B

## 2018-01-01 PROCEDURE — 77030008477 HC STYL SATN SLP COVD -A

## 2018-01-01 PROCEDURE — 92950 HEART/LUNG RESUSCITATION CPR: CPT

## 2018-06-26 NOTE — ED NOTES
Asystole in Leads II and III. Dr. Laura Woodward at bedside. Ultrasound at bedside. Dr. Laura Woodward confirmed no cardiac activity. Time of death 56.

## 2018-06-26 NOTE — ED NOTES
Pt arrived to ED at 12:55. Per EMS: pt was found lying on ground outside of restaruant. Unknown down time. EMS reports initial rhythm of coarse v-fib, progressing to fine vib, and then asystole. Rhythm checks for past 20 minutes per EMS: asystole. Prior to arrival: pt given 7 epi, 300mg amiodarone, 90 meq bi-carb.     7.0 ETT in place; Leatha Monas in place. Rhythm check in ED: asystole  No cardiac activity confirmed by ultrasound by Dr. Nikolas Paredes. Time of death 56. Pastoral care in ED to support family. Pt undressed and belongings placed in bag. Pt with $248.40 in cash in his pockets.

## 2018-06-26 NOTE — PROGRESS NOTES
Received patient in ER with rescue performing CPR.  Patient unwitnessed arrest and code called, no heart rhythm

## 2018-06-26 NOTE — PROGRESS NOTES
Bereavement Note:     responded to the death of Michael Simental, who is a 45 y.o., male, offering Spiritual Care to patient and family, see flow sheets for interventions. Date of Death: 18  Extended Emergency Contact Information  Primary Emergency Contact: [facility]  Address: [place] [facility] , [address]  Mobile Phone: [phone]  Relation: Sister  Secondary Emergency Contact: [address] [facility]  Mobile Phone: [phone]  Relation: Other Relative                 YES      NO  UNKNOWN  Life Net   [x]        []    []   Eye Bank   [x] [] []   Medical Examiner  []        [x]  []   Going to [employer]  [x]        [] []      Autopsy   []        [x]         []   Sympathy Card  [x]        []  Bereavement Materials  [x]        []           Business Card Provided  [x]        []              Home: Not known at this time. Chaplains will continue to follow family and will provide spiritual care as needed. Rev.  [address] [facility]  [phone]

## 2018-06-29 NOTE — PROGRESS NOTES
Hospitalist Progress Note    Patient: Sariah Malik MRN: [id number]  CSN: [id number]    YOB: 1965  Age: 46 y.o. Sex: male    DOA: 7/24/2017 LOS:  LOS: 5 days          Chief Complaint: foot infection    Assessment/Plan     Cellulitis L foot  Osteomyelitis L foot. DM2 uncontrolled. a1c 11.2  Hyponatremia. Continue antibiotics. Podiatry and Vascular following. Will need vascular assessment. Eval pend. Wound care. Adjust dm meds. Check lipid status next blood draw. Suspect underlying vascular disease / atherosclerosis. Favor proactive secondary prevention. DVT px. Still needs inpatient eval/tx. Full code. Patient Active Problem List   Diagnosis Code    Cellulitis L03.90    [facility]gly[facility] due to [facility] 2 [facility] ([facility] E11.65    Hyponatremia E87.1       Subjective:    Seen and examiend at bedside. Chart reviewed. Under treatment for foot infection. Review of systems:    Constitutional: denies fevers, chills, myalgias  Respiratory: denies SOB, cough  Cardiovascular: denies chest pain, palpitations  Gastrointestinal: denies nausea, vomiting, diarrhea      Vital signs/Intake and Output:  Visit Vitals    /66 (BP 1 Location: Right arm, BP Patient Position: At rest)    Pulse 83    Temp 98.7 °F (37.1 °C)    Resp 18    Ht 5' 10\" (1.778 m)    Wt 81.5 kg (179 lb 9.6 oz)    SpO2 99%    BMI 25.77 kg/m2     Current Shift:  07/29 0701 - 07/29 1900  In: -   Out: 400 [Urine:400]  Last three shifts:  07/27 1901 - 07/29 0700  In: 4278.3 [P.O.:680; I.V.:3598.3]  Out: 4325 [Urine:4325]    Exam:    General: Well developed, alert, NAD, OX3  Head/Neck: NCAT, supple, No masses, No lymphadenopathy  CVS:Regular rate and rhythm, no M/R/G, S1/S2 heard, no thrill  Lungs:Clear to auscultation bilaterally, no wheezes, rhonchi, or rales  Abdomen: Soft, bs+  Extremities: Left foot in dressing.                 Labs: Results:       Chemistry Recent Labs      07/29/17   6843 07/28/17   0249 07/27/17   0257   GLU  212*  192*  174*   NA  133*  132*  133*   K  3.6  3.6  3.6   CL  98*  98*  97*   CO2  28  28  24   BUN  9  8  7   CREA  0.99  0.94  0.86   CA  8.5  8.3*  8.4*   AGAP  7  6  12   BUCR  9*  9*  8*      CBC w/Diff Recent Labs      07/29/17   0525  07/28/17   0249 07/27/17   0257   WBC  12.7  13.1  16.8*   RBC  4.07*  3.93*  4.18*   HGB  11.9*  11.7*  12.3*   HCT  34.6*  33.1*  34.9*   PLT  279  260  251      Cardiac Enzymes No results for input(s): CPK, CKND1, KARY in the last 72 hours. No lab exists for component: CKRMB, TROIP   Coagulation No results for input(s): PTP, INR, APTT in the last 72 hours. No lab exists for component: INREXT    Lipid Panel No results found for: CHOL, CHOLPOCT, CHOLX, CHLST, CHOLV, 331602, HDL, LDL, LDLC, DLDLP, 514594, VLDLC, VLDL, TGLX, TRIGL, TRIGP, TGLPOCT, CHHD, CHHDX   BNP No results for input(s): BNPP in the last 72 hours. Liver Enzymes No results for input(s): TP, ALB, TBIL, AP, SGOT, GPT in the last 72 hours.     No lab exists for component: DBIL   Thyroid Studies No results found for: T4, T3U, TSH, TSHEXT     Procedures/imaging: see electronic medical records for all procedures/Xrays and details which were not copied into this note but were reviewed prior to creation of Benjy Myers MD .

## 2022-01-18 NOTE — PROGRESS NOTES
DRY EYE TREATMENT    I recommend using artificial tears for your dry eye. There are over the counter drops that work well and may be used up to 4x daily. ( systane balance or ultra, blink, refresh optive, soothe xp) stay away from any red eye relief products such as visine and clear eyes.     If you need more than 4 drops daily, use a preservative free product which come in individual vials and may be used for 24 hours and discarded.   The more severe the dry eye, the more frequent instillation of artificial tears  that are needed to reduce irritation/ inflammation. (Sometimes every 1-2 hours for a couple of days).  You can also add a lubricating ointment in the lower lid at bedtime. ( over the counter refresh pm, genteal gel, lacrilube etc.)    Artificial tears work best as a preventative and not as well after your eyes are starting to bother you and/ or are red.  It may take 4- 6 weeks of using the drops before you notice improvement.  If after that time you are still having problems schedule an appointment for an evaluation to discuss different treatments.    Dry eyes are a chronic condition and you may have more symptoms at certain times of the year.      Additional recommended treatment:  Warm compresses once to twice daily for 5-10 minutes    Directions for warm soaks  There are few methods for hot compresses. Moisten a washcloth with hot water, or microwave for 10 seconds, being careful to not get the cloth too hot.   Then put the washcloth onto your eyelids for 5 minutes. It will cool quickly so a rice pack or eyemask that can be heated and laid on top of the washcloth will help retain the heat.      Overabundance of bacterial microorganisms along the eyelashes and lid margins induce stress on the tear film and promote inflammation.  Regular lid hygiene helps diminish the bacterial population to prevent inflammation and infection.  Use a warm compress to loosen crusts   Cleanse lids once daily with a lid  Pt seen today for wound care LT foot. Stated that the wound dressing was changed about 3 hours ago. Pt alert and in good spirits. Derm:   Wound assessment  LT foot:  Dorsal and plantar wounds, deep, possibly probes to deep tissue or bone. No malodor. Red granulation tissue noted. No purulence. No malodor. Betadine soaked packing noted. The 3rd toe is black and firm. The 4th toe, is deep purple.  + blanching noted. Second toe, dusky red to purple discoloration. The wound beds look improved as compared to initial observation yesterday. Assessment:  Gangrene LT 3rd toe, PVD,  DM, Osteomyelitis  Lt foot, Puncture wound. Plan:   Wounds observed. Discussed with the pt about amputation of the toe (s), TMA with local wound care. Explained the risks of PVD. Tentatively planning for surgical procedures on  Sat. Aug. 5 with Dr. Lucero Funes. He will be  notified of the status of the p  atient and the wound. cleansing product as directed such as Ocusoft or Sterilid which can be purchased at most pharmacies. Diluted baby shampoo will also work, but not as well as it dries the skin and can irritate eyelids.  Hypo chlor spray may also be used on closed lids.      Omega 3 fatty acid supplements taken 1-2x daily  Recommend  at least  2000mg omega 3  800 EPA  600 DHA    Blink regularly  Stay hydrated  Increase humidity  Wear sunglasses   Avoid direct exposure to forced air--turn air vents in the car away and keep fans from blowing directly on your face
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
## 2024-07-21 NOTE — PROGRESS NOTES
Problem: Mobility Impaired (Adult and Pediatric)  Goal: *Acute Goals and Plan of Care (Insert Text)  In 1-7 days pt will be able to perform:  ST. Bed mobility: Rolling L to R to L modified independent for positioning. 2. Supine to sit to supine S with HR for meals. 3. Sit to stand to sit S with RW in prep for ambulation. LT. Gait: Ambulate >150ft S with RW, L NWB, for home/community mobility. 2. Stair Negotiation: Ascend/descend >3 steps L NWB CGA with HRs for home entry. 3. Activity tolerance: Tolerate up in chair 1-2 hours for ADLs. 4. Patient/Family Education: Patient/family to be independent with HEP for follow-up care and safe discharge. 17 Updated goals to be met in 7 days:  ST. Bed mobility: Rolling L to R to L modified independent for positioning. 2. Supine to sit to supine S with HR for meals. 3. Sit to stand to sit S with RW in prep for ambulation. LT. Gait: Ambulate >150ft S with RW, L WBAT, for home/community mobility. 2. Stair Negotiation: Ascend/descend >3 steps L WBAT CGA with HRs for home entry. 3. Activity tolerance: Tolerate up in chair 1-2 hours for ADLs. 4. Patient/Family Education: Patient/family to be independent with HEP for follow-up care and safe discharge. Physical Therapy Goals LT/ST  Initiated 2017 and to be accomplished within 3-5 day(s)  1. Patient will move from supine <> sit with S in prep for out of bed activity and change of position. 2. Patient will perform sit<> stand with S with LRAD in prep for transfers/ambulation. 3. Patient will transfer from bed <> chair with S with LRAD for time up in chair for completion of ADL activity. 4. Patient will ambulate 150 feet with LRAD/S for improved functional mobility/safe discharge. .   5. Patient will ascend/descend 3-5 stairs with handrail L NWB withcontact guard assist for home re-entry as needed. 6. Patient/Family will be independent w/ HEP for follow-up care and safe d/c.    Outcome: Progressing Towards Goal  PHYSICAL THERAPY TREATMENT     Patient: Paula Acharya (47 y.o. male)  Date: 8/8/2017  Diagnosis: Cellulitis  CELLULITIS OF LEFT FOOT  infected left foot Cellulitis  Procedure(s) (LRB):  INCISION AND DRAINAGE LEFT FOOT WITH TRANSMETATARSAL AMPUTATION (Left) 2 Days Post-Op  Precautions: Fall, NWB   Chart, physical therapy assessment, plan of care and goals were reviewed. ASSESSMENT:  Pt found in semi-gonzalez position w/ IV, L LE dressing, and R SCDs. Pt reported 7/10 pain in L LE prior to treatment but willing to work with PT. Pt requires VCing for proper hand placement w/ RW to ensure pt's safety while performing transfers sit<>stand. Pt was able to ambulate 120 ft w/ RW/GB L LE NWB, however pt demonstrates decrease rachelle, decrease push off for R LE for foot clearance, and decrease stride length. Pt required 2 standing rest breaks after reaching 100 ft secondary to decrease activity tolerance. Pt was transferred back to bed after gait training, pt declined performing therex due to his activity tolerance and wanted to relax. Pt was left in bed, call bell and tray in reach, nurse notified of session. Progression toward goals:  [X]      Improving appropriately and progressing toward goals  [ ]      Improving slowly and progressing toward goals  [ ]      Not making progress toward goals and plan of care will be adjusted       PLAN:  Patient continues to benefit from skilled intervention to address the above impairments. Continue treatment per established plan of care. Discharge Recommendations:  Home Health  Further Equipment Recommendations for Discharge:  rolling walker       SUBJECTIVE:   Patient stated You know how to make people tired.       OBJECTIVE DATA SUMMARY:   Critical Behavior:  Neurologic State: Alert  Orientation Level: Oriented X4  Cognition: Appropriate decision making, Follows commands  Safety/Judgement: Fall prevention  Functional Mobility Training:  Bed Mobility:  Supine to Sit: Supervision  Sit to Supine: Supervision  Transfers:  Sit to Stand: Supervision;Contact guard assistance  Stand to Sit: Contact guard assistance  Balance:  Sitting: Intact  Standing: Intact; With support  Ambulation/Gait Training:  Distance (ft): 120 Feet (ft)  Assistive Device: Gait belt;Walker, rolling  Ambulation - Level of Assistance: Contact guard assistance  Gait Abnormalities: Antalgic;Decreased step clearance  Left Side Weight Bearing: Non-weight bearing  Base of Support: Shift to right  Stance: Right increased  Speed/Kralie: Slow  Step Length: Right shortened  Interventions: Safety awareness training; Tactile cues; Verbal cues  Therapeutic Exercises:   None  Pain:  Pain Scale 1: Numeric (0 - 10)  Pain Intensity 1: 7  Activity Tolerance:   Fair+  Please refer to the flowsheet for vital signs taken during this treatment.   After treatment:   [ ] Patient left in no apparent distress sitting up in chair  [X] Patient left in no apparent distress in bed  [X] Call bell left within reach  [X] Nursing notified  [ ] Caregiver present  [ ] Bed alarm activated      Rico Payton PT   Time Calculation: 16 mins No

## (undated) DEVICE — INTENDED FOR TISSUE SEPARATION, AND OTHER PROCEDURES THAT REQUIRE A SHARP SURGICAL BLADE TO PUNCTURE OR CUT.: Brand: BARD-PARKER ® CARBON RIB-BACK BLADES

## (undated) DEVICE — BANDAGE COMPR EXSANGUATION SGL LAYERED NO CLSR 9FT LEN 4IN W

## (undated) DEVICE — ABDOMINAL PAD: Brand: DERMACEA

## (undated) DEVICE — 3L THIN WALL CAN: Brand: CRD

## (undated) DEVICE — DRESSING,GAUZE,PETROLATUM,CURAD,3"X9",ST: Brand: CURAD

## (undated) DEVICE — KIT DRAIN 100CC W/7MM PERF: Brand: CARDINAL HEALTH

## (undated) DEVICE — REM POLYHESIVE ADULT PATIENT RETURN ELECTRODE: Brand: VALLEYLAB

## (undated) DEVICE — NDL PRT INJ NSAF BLNT 18GX1.5 --

## (undated) DEVICE — DRAPE TWL SURG 16X26IN BLU ORB04] ALLCARE INC]

## (undated) DEVICE — SPONGE LAP 18X18IN STRL -- 5/PK

## (undated) DEVICE — PREP CHLORAPREP 10.5 ML ORG --

## (undated) DEVICE — DEVON™ KNEE AND BODY STRAP 60" X 3" (1.5 M X 7.6 CM): Brand: DEVON

## (undated) DEVICE — (D)PACK EXTREMITY CUSTOM -- DISC BY MFR USE ITEM 338833

## (undated) DEVICE — BANDAGE,GAUZE,BULKEE II,4.5"X4.1YD,STRL: Brand: MEDLINE

## (undated) DEVICE — 3M™ STERI-STRIP™ REINFORCED ADHESIVE SKIN CLOSURES, R1546, 1/4 IN X 4 IN (6 MM X 100 MM), 10 STRIPS/ENVELOPE: Brand: 3M™ STERI-STRIP™

## (undated) DEVICE — SUT ETHLN 3-0 18IN PS1 BLK --

## (undated) DEVICE — DRESSING,GAUZE,XEROFORM,CURAD,1"X8",ST: Brand: CURAD

## (undated) DEVICE — SPONGE GZ W4XL4IN COT 12 PLY TYP VII WVN C FLD DSGN

## (undated) DEVICE — BNDG SOF-FORM 2X75 STRL LF --

## (undated) DEVICE — GAUZE PKG STRP IODOFRM 1/4X5YD --

## (undated) DEVICE — Device

## (undated) DEVICE — GOWN,AURORA,NONRNF,XL,30/CS: Brand: MEDLINE

## (undated) DEVICE — (D)SYR 10ML 1/5ML GRAD NSAF -- PKGING CHANGE USE ITEM 338027

## (undated) DEVICE — OCCLUSIVE GAUZE STRIP,3% BISMUTH TRIBROMOPHENATE IN PETROLATUM BLEND: Brand: XEROFORM

## (undated) DEVICE — KERLIX BANDAGE ROLL: Brand: KERLIX

## (undated) DEVICE — BANDAGE COMPR W3XL186IN SYNTH KNIT DSGN COHESIVE ELAS ECON

## (undated) DEVICE — TRAY PREP DRY W/ PREM GLV 2 APPL 6 SPNG 2 UNDPD 1 OVERWRAP

## (undated) DEVICE — MASTISOL ADHESIVE LIQ 2/3ML

## (undated) DEVICE — SOL IRRIGATION INJ NACL 0.9% 500ML BTL